# Patient Record
Sex: FEMALE | Race: WHITE | NOT HISPANIC OR LATINO | URBAN - METROPOLITAN AREA
[De-identification: names, ages, dates, MRNs, and addresses within clinical notes are randomized per-mention and may not be internally consistent; named-entity substitution may affect disease eponyms.]

---

## 2018-02-18 ENCOUNTER — INPATIENT (INPATIENT)
Facility: HOSPITAL | Age: 83
LOS: 9 days | Discharge: NURSING, REHAB OR LONG-TERM CARE | DRG: 291 | End: 2018-02-28
Attending: INTERNAL MEDICINE | Admitting: INTERNAL MEDICINE
Payer: MEDICARE

## 2018-02-18 VITALS
SYSTOLIC BLOOD PRESSURE: 107 MMHG | OXYGEN SATURATION: 100 % | DIASTOLIC BLOOD PRESSURE: 78 MMHG | RESPIRATION RATE: 32 BRPM | HEART RATE: 120 BPM

## 2018-02-18 DIAGNOSIS — J81.1 CHRONIC PULMONARY EDEMA: ICD-10-CM

## 2018-02-18 DIAGNOSIS — I50.23 ACUTE ON CHRONIC SYSTOLIC (CONGESTIVE) HEART FAILURE: ICD-10-CM

## 2018-02-18 DIAGNOSIS — E78.5 HYPERLIPIDEMIA, UNSPECIFIED: ICD-10-CM

## 2018-02-18 DIAGNOSIS — J96.92 RESPIRATORY FAILURE, UNSPECIFIED WITH HYPERCAPNIA: ICD-10-CM

## 2018-02-18 DIAGNOSIS — R41.82 ALTERED MENTAL STATUS, UNSPECIFIED: ICD-10-CM

## 2018-02-18 DIAGNOSIS — E03.9 HYPOTHYROIDISM, UNSPECIFIED: ICD-10-CM

## 2018-02-18 DIAGNOSIS — R09.02 HYPOXEMIA: ICD-10-CM

## 2018-02-18 DIAGNOSIS — Z95.1 PRESENCE OF AORTOCORONARY BYPASS GRAFT: Chronic | ICD-10-CM

## 2018-02-18 DIAGNOSIS — J18.9 PNEUMONIA, UNSPECIFIED ORGANISM: ICD-10-CM

## 2018-02-18 DIAGNOSIS — J90 PLEURAL EFFUSION, NOT ELSEWHERE CLASSIFIED: ICD-10-CM

## 2018-02-18 DIAGNOSIS — J98.11 ATELECTASIS: ICD-10-CM

## 2018-02-18 DIAGNOSIS — I10 ESSENTIAL (PRIMARY) HYPERTENSION: ICD-10-CM

## 2018-02-18 DIAGNOSIS — J96.00 ACUTE RESPIRATORY FAILURE, UNSPECIFIED WHETHER WITH HYPOXIA OR HYPERCAPNIA: ICD-10-CM

## 2018-02-18 DIAGNOSIS — R06.02 SHORTNESS OF BREATH: ICD-10-CM

## 2018-02-18 DIAGNOSIS — Z29.9 ENCOUNTER FOR PROPHYLACTIC MEASURES, UNSPECIFIED: ICD-10-CM

## 2018-02-18 DIAGNOSIS — I48.91 UNSPECIFIED ATRIAL FIBRILLATION: ICD-10-CM

## 2018-02-18 LAB
ALBUMIN SERPL ELPH-MCNC: 3.4 G/DL — SIGNIFICANT CHANGE UP (ref 3.3–5)
ALP SERPL-CCNC: 94 U/L — SIGNIFICANT CHANGE UP (ref 40–120)
ALT FLD-CCNC: 30 U/L — SIGNIFICANT CHANGE UP (ref 10–45)
ANION GAP SERPL CALC-SCNC: 9 MMOL/L — SIGNIFICANT CHANGE UP (ref 5–17)
APPEARANCE UR: CLEAR — SIGNIFICANT CHANGE UP
APTT BLD: 28.8 SEC — SIGNIFICANT CHANGE UP (ref 27.5–37.4)
AST SERPL-CCNC: 11 U/L — SIGNIFICANT CHANGE UP (ref 10–40)
BACTERIA # UR AUTO: PRESENT /HPF
BASE EXCESS BLDA CALC-SCNC: 3.9 MMOL/L — HIGH (ref -2–3)
BASE EXCESS BLDV CALC-SCNC: 2.3 MMOL/L — SIGNIFICANT CHANGE UP
BASOPHILS NFR BLD AUTO: 0.1 % — SIGNIFICANT CHANGE UP (ref 0–2)
BILIRUB SERPL-MCNC: 0.4 MG/DL — SIGNIFICANT CHANGE UP (ref 0.2–1.2)
BILIRUB UR-MCNC: NEGATIVE — SIGNIFICANT CHANGE UP
BUN SERPL-MCNC: 38 MG/DL — HIGH (ref 7–23)
CALCIUM SERPL-MCNC: 8.7 MG/DL — SIGNIFICANT CHANGE UP (ref 8.4–10.5)
CHLORIDE SERPL-SCNC: 102 MMOL/L — SIGNIFICANT CHANGE UP (ref 96–108)
CO2 SERPL-SCNC: 30 MMOL/L — SIGNIFICANT CHANGE UP (ref 22–31)
COLOR SPEC: YELLOW — SIGNIFICANT CHANGE UP
CREAT SERPL-MCNC: 1.1 MG/DL — SIGNIFICANT CHANGE UP (ref 0.5–1.3)
DIFF PNL FLD: NEGATIVE — SIGNIFICANT CHANGE UP
EOSINOPHIL NFR BLD AUTO: 0.1 % — SIGNIFICANT CHANGE UP (ref 0–6)
EPI CELLS # UR: SIGNIFICANT CHANGE UP /HPF (ref 0–5)
GAS PNL BLDA: SIGNIFICANT CHANGE UP
GAS PNL BLDV: SIGNIFICANT CHANGE UP
GLUCOSE SERPL-MCNC: 117 MG/DL — HIGH (ref 70–99)
GLUCOSE UR QL: NEGATIVE — SIGNIFICANT CHANGE UP
HCO3 BLDA-SCNC: 34 MMOL/L — HIGH (ref 21–28)
HCO3 BLDV-SCNC: 33 MMOL/L — HIGH (ref 20–27)
HCT VFR BLD CALC: 43.6 % — SIGNIFICANT CHANGE UP (ref 34.5–45)
HGB BLD-MCNC: 13.2 G/DL — SIGNIFICANT CHANGE UP (ref 11.5–15.5)
HYALINE CASTS # UR AUTO: (no result) /LPF (ref 0–2)
INR BLD: 1.04 — SIGNIFICANT CHANGE UP (ref 0.88–1.16)
KETONES UR-MCNC: NEGATIVE — SIGNIFICANT CHANGE UP
LACTATE SERPL-SCNC: 1 MMOL/L — SIGNIFICANT CHANGE UP (ref 0.5–2)
LEUKOCYTE ESTERASE UR-ACNC: NEGATIVE — SIGNIFICANT CHANGE UP
LYMPHOCYTES # BLD AUTO: 7.2 % — LOW (ref 13–44)
MCHC RBC-ENTMCNC: 29.1 PG — SIGNIFICANT CHANGE UP (ref 27–34)
MCHC RBC-ENTMCNC: 30.3 G/DL — LOW (ref 32–36)
MCV RBC AUTO: 96 FL — SIGNIFICANT CHANGE UP (ref 80–100)
MONOCYTES NFR BLD AUTO: 6 % — SIGNIFICANT CHANGE UP (ref 2–14)
NEUTROPHILS NFR BLD AUTO: 86.6 % — HIGH (ref 43–77)
NITRITE UR-MCNC: NEGATIVE — SIGNIFICANT CHANGE UP
PCO2 BLDA: 75 MMHG — CRITICAL HIGH (ref 32–45)
PCO2 BLDA: SIGNIFICANT CHANGE UP MMHG (ref 32–45)
PCO2 BLDV: 80 MMHG — HIGH (ref 41–51)
PH BLDA: 7.26 — LOW (ref 7.35–7.45)
PH BLDA: SIGNIFICANT CHANGE UP (ref 7.35–7.45)
PH BLDV: 7.23 — CRITICAL LOW (ref 7.32–7.43)
PH UR: 6 — SIGNIFICANT CHANGE UP (ref 5–8)
PLATELET # BLD AUTO: 278 K/UL — SIGNIFICANT CHANGE UP (ref 150–400)
PO2 BLDA: 135 MMHG — HIGH (ref 83–108)
PO2 BLDA: SIGNIFICANT CHANGE UP MMHG (ref 83–108)
PO2 BLDV: 31 MMHG — SIGNIFICANT CHANGE UP
POTASSIUM SERPL-MCNC: 4.9 MMOL/L — SIGNIFICANT CHANGE UP (ref 3.5–5.3)
POTASSIUM SERPL-SCNC: 4.9 MMOL/L — SIGNIFICANT CHANGE UP (ref 3.5–5.3)
PROT SERPL-MCNC: 6.2 G/DL — SIGNIFICANT CHANGE UP (ref 6–8.3)
PROT UR-MCNC: 100 MG/DL
PROTHROM AB SERPL-ACNC: 11.5 SEC — SIGNIFICANT CHANGE UP (ref 9.8–12.7)
RAPID RVP RESULT: SIGNIFICANT CHANGE UP
RBC # BLD: 4.54 M/UL — SIGNIFICANT CHANGE UP (ref 3.8–5.2)
RBC # FLD: 15.5 % — SIGNIFICANT CHANGE UP (ref 10.3–16.9)
RBC CASTS # UR COMP ASSIST: < 5 /HPF — SIGNIFICANT CHANGE UP
SAO2 % BLDA: 99 % — SIGNIFICANT CHANGE UP (ref 95–100)
SAO2 % BLDA: SIGNIFICANT CHANGE UP % (ref 95–100)
SAO2 % BLDV: 62 % — SIGNIFICANT CHANGE UP
SODIUM SERPL-SCNC: 141 MMOL/L — SIGNIFICANT CHANGE UP (ref 135–145)
SP GR SPEC: >=1.03 — SIGNIFICANT CHANGE UP (ref 1–1.03)
UROBILINOGEN FLD QL: 0.2 E.U./DL — SIGNIFICANT CHANGE UP
WBC # BLD: 10.1 K/UL — SIGNIFICANT CHANGE UP (ref 3.8–10.5)
WBC # FLD AUTO: 10.1 K/UL — SIGNIFICANT CHANGE UP (ref 3.8–10.5)
WBC UR QL: < 5 /HPF — SIGNIFICANT CHANGE UP

## 2018-02-18 PROCEDURE — 93010 ELECTROCARDIOGRAM REPORT: CPT

## 2018-02-18 PROCEDURE — 93306 TTE W/DOPPLER COMPLETE: CPT | Mod: 26

## 2018-02-18 PROCEDURE — 70450 CT HEAD/BRAIN W/O DYE: CPT | Mod: 26

## 2018-02-18 PROCEDURE — 71275 CT ANGIOGRAPHY CHEST: CPT | Mod: 26

## 2018-02-18 PROCEDURE — 99291 CRITICAL CARE FIRST HOUR: CPT

## 2018-02-18 PROCEDURE — 99291 CRITICAL CARE FIRST HOUR: CPT | Mod: 25

## 2018-02-18 PROCEDURE — 71045 X-RAY EXAM CHEST 1 VIEW: CPT | Mod: 26

## 2018-02-18 RX ORDER — SODIUM CHLORIDE 9 MG/ML
3 INJECTION INTRAMUSCULAR; INTRAVENOUS; SUBCUTANEOUS ONCE
Qty: 0 | Refills: 0 | Status: COMPLETED | OUTPATIENT
Start: 2018-02-18 | End: 2018-02-18

## 2018-02-18 RX ORDER — VANCOMYCIN HCL 1 G
1000 VIAL (EA) INTRAVENOUS ONCE
Qty: 0 | Refills: 0 | Status: COMPLETED | OUTPATIENT
Start: 2018-02-18 | End: 2018-02-18

## 2018-02-18 RX ORDER — ATORVASTATIN CALCIUM 80 MG/1
1 TABLET, FILM COATED ORAL
Qty: 0 | Refills: 0 | COMMUNITY

## 2018-02-18 RX ORDER — LANOLIN ALCOHOL/MO/W.PET/CERES
1 CREAM (GRAM) TOPICAL
Qty: 0 | Refills: 0 | COMMUNITY

## 2018-02-18 RX ORDER — PIPERACILLIN AND TAZOBACTAM 4; .5 G/20ML; G/20ML
3.38 INJECTION, POWDER, LYOPHILIZED, FOR SOLUTION INTRAVENOUS EVERY 6 HOURS
Qty: 0 | Refills: 0 | Status: DISCONTINUED | OUTPATIENT
Start: 2018-02-18 | End: 2018-02-20

## 2018-02-18 RX ORDER — VANCOMYCIN HCL 1 G
2000 VIAL (EA) INTRAVENOUS ONCE
Qty: 0 | Refills: 0 | Status: DISCONTINUED | OUTPATIENT
Start: 2018-02-18 | End: 2018-02-18

## 2018-02-18 RX ORDER — ASPIRIN/CALCIUM CARB/MAGNESIUM 324 MG
300 TABLET ORAL DAILY
Qty: 0 | Refills: 0 | Status: DISCONTINUED | OUTPATIENT
Start: 2018-02-18 | End: 2018-02-19

## 2018-02-18 RX ORDER — PIPERACILLIN AND TAZOBACTAM 4; .5 G/20ML; G/20ML
4.5 INJECTION, POWDER, LYOPHILIZED, FOR SOLUTION INTRAVENOUS ONCE
Qty: 0 | Refills: 0 | Status: COMPLETED | OUTPATIENT
Start: 2018-02-18 | End: 2018-02-18

## 2018-02-18 RX ORDER — IPRATROPIUM/ALBUTEROL SULFATE 18-103MCG
3 AEROSOL WITH ADAPTER (GRAM) INHALATION EVERY 4 HOURS
Qty: 0 | Refills: 0 | Status: DISCONTINUED | OUTPATIENT
Start: 2018-02-18 | End: 2018-02-28

## 2018-02-18 RX ORDER — ASPIRIN/CALCIUM CARB/MAGNESIUM 324 MG
81 TABLET ORAL DAILY
Qty: 0 | Refills: 0 | Status: DISCONTINUED | OUTPATIENT
Start: 2018-02-18 | End: 2018-02-18

## 2018-02-18 RX ORDER — VANCOMYCIN HCL 1 G
750 VIAL (EA) INTRAVENOUS EVERY 24 HOURS
Qty: 0 | Refills: 0 | Status: DISCONTINUED | OUTPATIENT
Start: 2018-02-19 | End: 2018-02-20

## 2018-02-18 RX ORDER — POLYETHYLENE GLYCOL 3350 17 G/17G
17 POWDER, FOR SOLUTION ORAL
Qty: 0 | Refills: 0 | COMMUNITY

## 2018-02-18 RX ORDER — PIPERACILLIN AND TAZOBACTAM 4; .5 G/20ML; G/20ML
3.38 INJECTION, POWDER, LYOPHILIZED, FOR SOLUTION INTRAVENOUS EVERY 6 HOURS
Qty: 0 | Refills: 0 | Status: DISCONTINUED | OUTPATIENT
Start: 2018-02-18 | End: 2018-02-18

## 2018-02-18 RX ORDER — METOPROLOL TARTRATE 50 MG
5 TABLET ORAL ONCE
Qty: 0 | Refills: 0 | Status: DISCONTINUED | OUTPATIENT
Start: 2018-02-18 | End: 2018-02-18

## 2018-02-18 RX ORDER — DOCUSATE SODIUM 100 MG
300 CAPSULE ORAL
Qty: 0 | Refills: 0 | COMMUNITY

## 2018-02-18 RX ORDER — CHOLECALCIFEROL (VITAMIN D3) 125 MCG
2 CAPSULE ORAL
Qty: 0 | Refills: 0 | COMMUNITY

## 2018-02-18 RX ORDER — ATORVASTATIN CALCIUM 80 MG/1
40 TABLET, FILM COATED ORAL AT BEDTIME
Qty: 0 | Refills: 0 | Status: DISCONTINUED | OUTPATIENT
Start: 2018-02-18 | End: 2018-02-28

## 2018-02-18 RX ORDER — SODIUM CHLORIDE 9 MG/ML
500 INJECTION INTRAMUSCULAR; INTRAVENOUS; SUBCUTANEOUS
Qty: 0 | Refills: 0 | Status: COMPLETED | OUTPATIENT
Start: 2018-02-18 | End: 2018-02-18

## 2018-02-18 RX ORDER — FUROSEMIDE 40 MG
40 TABLET ORAL ONCE
Qty: 0 | Refills: 0 | Status: COMPLETED | OUTPATIENT
Start: 2018-02-18 | End: 2018-02-18

## 2018-02-18 RX ORDER — LISINOPRIL 2.5 MG/1
1 TABLET ORAL
Qty: 0 | Refills: 0 | COMMUNITY

## 2018-02-18 RX ORDER — ASPIRIN/CALCIUM CARB/MAGNESIUM 324 MG
1 TABLET ORAL
Qty: 0 | Refills: 0 | COMMUNITY

## 2018-02-18 RX ORDER — HEPARIN SODIUM 5000 [USP'U]/ML
5000 INJECTION INTRAVENOUS; SUBCUTANEOUS EVERY 8 HOURS
Qty: 0 | Refills: 0 | Status: DISCONTINUED | OUTPATIENT
Start: 2018-02-18 | End: 2018-02-28

## 2018-02-18 RX ADMIN — Medication 108 MILLIGRAM(S): at 11:46

## 2018-02-18 RX ADMIN — Medication 300 MILLIGRAM(S): at 23:55

## 2018-02-18 RX ADMIN — SODIUM CHLORIDE 2000 MILLILITER(S): 9 INJECTION INTRAMUSCULAR; INTRAVENOUS; SUBCUTANEOUS at 10:55

## 2018-02-18 RX ADMIN — PIPERACILLIN AND TAZOBACTAM 200 GRAM(S): 4; .5 INJECTION, POWDER, LYOPHILIZED, FOR SOLUTION INTRAVENOUS at 18:56

## 2018-02-18 RX ADMIN — HEPARIN SODIUM 5000 UNIT(S): 5000 INJECTION INTRAVENOUS; SUBCUTANEOUS at 22:58

## 2018-02-18 RX ADMIN — SODIUM CHLORIDE 3 MILLILITER(S): 9 INJECTION INTRAMUSCULAR; INTRAVENOUS; SUBCUTANEOUS at 11:10

## 2018-02-18 RX ADMIN — Medication 1 DROP(S): at 22:58

## 2018-02-18 RX ADMIN — SODIUM CHLORIDE 2000 MILLILITER(S): 9 INJECTION INTRAMUSCULAR; INTRAVENOUS; SUBCUTANEOUS at 11:10

## 2018-02-18 RX ADMIN — PIPERACILLIN AND TAZOBACTAM 200 GRAM(S): 4; .5 INJECTION, POWDER, LYOPHILIZED, FOR SOLUTION INTRAVENOUS at 11:40

## 2018-02-18 RX ADMIN — SODIUM CHLORIDE 2000 MILLILITER(S): 9 INJECTION INTRAMUSCULAR; INTRAVENOUS; SUBCUTANEOUS at 11:00

## 2018-02-18 RX ADMIN — Medication 250 MILLIGRAM(S): at 13:03

## 2018-02-18 RX ADMIN — SODIUM CHLORIDE 2000 MILLILITER(S): 9 INJECTION INTRAMUSCULAR; INTRAVENOUS; SUBCUTANEOUS at 11:37

## 2018-02-18 NOTE — H&P ADULT - HISTORY OF PRESENT ILLNESS
89 yo F with PMH HTN, HLD, hypothyroidism, osteoporosis PSHx triple bipass and mitral valve repair, presented from nursing/rehab center where she was found to be altered, tachypneic and in respiratory distress. In ED she was noted to be altered, tachypneic and tachycardic so she was placed on BIPAP. Vitals -126, /82, RR 17-27, SpO2 94% on BIPAP. CT head was negative for any acute pathology. EKG showed Aflutter with variable block, LAD, LVH with repolarization abnormality Labs were remarkable for Trop 0.02, pro-BNP 4552. VBG showed respiratory acidosis pH 7.23, pCO2 80, HCO3 33.  Bedside echo performed by cardio fellow showed EF 35-40%, LVH and hypokinetic RV. Chest X ray showed congestion and/or infiltrates with bilateral effusions. CTA PE protocol was ordered given hypoxia and signs of right heart failure. CTA was negative for PE, showed large bilateral pleural effusions and atelectasis of the bilateral lower lobes with patchy airspace opacities in the right upper lobe most likely representing pneumonia. Pt received 40 mg IV lasix and was transfered to CCU for further management. Upon arrival in the unit, the patient remained on BIPAP and altered, minimally responsive. 89 yo F with PMH HTN, HLD, hypothyroidism, osteoporosis PSHx triple bypass and mitral valve repair, presented from nursing/rehab center where she was found to be altered, tachypneic and in respiratory distress. In ED she was noted to be altered, tachypneic and tachycardic so she was placed on BIPAP. Vitals -126, /82, RR 17-27, SpO2 94% on BIPAP. CT head was negative for any acute pathology. EKG showed Aflutter with variable block, LAD, LVH with repolarization abnormality Labs were remarkable for Trop 0.02, pro-BNP 4552. VBG showed respiratory acidosis pH 7.23, pCO2 80, HCO3 33.  Bedside echo performed by cardio fellow showed EF 35-40%, LVH and hypokinetic RV. Chest X ray showed congestion and/or infiltrates with bilateral effusions. CTA PE protocol was ordered given hypoxia and signs of right heart failure. CTA was negative for PE, showed large bilateral pleural effusions and atelectasis of the bilateral lower lobes with patchy airspace opacities in the right upper lobe most likely representing pneumonia. Pt received 40 mg IV lasix and was transfered to CCU for further management. Upon arrival in the unit, the patient remained on BIPAP and altered, minimally responsive. 89 yo F with PMH CHF, HTN, HLD, hypothyroidism, osteoporosis PSHx triple bypass and mitral valve repair, presented from nursing/rehab center where she was found to be altered, tachypneic and in respiratory distress. In ED she was noted to be altered, tachypneic and tachycardic so she was placed on BIPAP. Vitals -126, /82, RR 17-27, SpO2 94% on BIPAP. CT head was negative for any acute pathology. EKG showed Aflutter with variable block, LAD, LVH with repolarization abnormality Labs were remarkable for Trop 0.02, pro-BNP 4552. VBG showed respiratory acidosis pH 7.23, pCO2 80, HCO3 33.  Bedside echo performed by cardio fellow showed EF 35-40%, LVH and hypokinetic RV. Chest X ray showed congestion and/or infiltrates with bilateral effusions. CTA PE protocol was ordered given hypoxia and signs of right heart failure. CTA was negative for PE, showed large bilateral pleural effusions and atelectasis of the bilateral lower lobes with patchy airspace opacities in the right upper lobe most likely representing pneumonia. Pt received 40 mg IV lasix and was transfered to CCU for further management. Upon arrival in the unit, the patient remained on BIPAP and altered, minimally responsive.

## 2018-02-18 NOTE — H&P ADULT - PROBLEM SELECTOR PLAN 2
Likely 2/2 CHF exacerbation and HAP. CT chest with large bilateral pleural effusions, atelectasis. Chest  -VBG showed respiratory acidosis pH 7.23, pCO2 80, HCO3 33. Repeat ABG showed  minimal improvement pH: 7.26/pCO2: 75/pO2:135/HCO3: 34/SaO2: 99 on 40%FiO2 RR 8, IPAP14/EPAP5. BIPAP setting changed to IPAP 14, EPAP5, RR12, FiO2 40%  -Will repeat ABG 4 hours later and adjust BIPAP settings accordingly

## 2018-02-18 NOTE — H&P ADULT - ATTENDING COMMENTS
89F with Atrial Fibrillation not on A/C, Essential HTN, HLD, Hypothyroidism, Systolic Heart Failure (unknown LVEF), Diastolic Heart Failure who presents from [place] with report of lethargy. Patient found to be in hypercarbic respiratory failure in [facility] ED requiring BIPAP initiation. CXR and CT Chest c/w pulmonary edema, pleural effusions and infiltrate c/f pneumonia. Pt given Lasix 60mg IV x1 and 600cc IVFs in ED.  Patient admitted to CCU for further management.     Vitals, labs, EKG and imaging reviewed 2/18  Exam notable for elderly frail female, laying in bed, BIPAP in place - large air leak, EJ markedly distended, pupils reactive to light b/l, arcus senelis, irreg irreg rate and rhythm, diffuse wet crackles, flat abdomen, 1+ YANIRA, cool feet, responsive to vocal and tactile stimuli, not oriented. NB: pt unable to hear upper registers well and responds promptly to lower register voices    -Continuous BIPAP   -Serial ABG to monitor CO2 improvement  -Lasix IV for goal urine out 3L, at least net neg 2L  -V/Z for broad spectrum HCAP coverage  -Hold on initiation of BB for Afib rate control given decompensated state  -SQ Hep ppx dose, hold on tx dose A/C given frailty and high bleeding risk  -Obtain collateral re cardiac history including Afib and A/C from Cardiologist and Alexis  -Obtain RVP swab  -Formal TTE  -Code status: DNR/DNI confirmed with son/HCP  -Pt to remain in CCU    MD Sarah  CCU Attending

## 2018-02-18 NOTE — H&P ADULT - ASSESSMENT
89 yo F with PMH HTN, HLD, hypothyroidism, osteoporosis PSHx triple bypass and mitral valve repair, presented from nursing/rehab center where she was found to be altered, tachypneic and in respiratory distress, admitted to the CCU for CHF exacerbation and hypoxic hypercapnic respiratory failure. 89 yo F with PMH CHF, HTN, HLD, hypothyroidism, osteoporosis PSHx triple bypass and mitral valve repair, presented from nursing/rehab center where she was found to be altered, tachypneic and in respiratory distress, admitted to the CCU for CHF exacerbation and hypoxic hypercapnic respiratory failure.

## 2018-02-18 NOTE — ED PROVIDER NOTE - NORMAL, MLM
Called pt and relayed MD note below.   Patient wanted me to let Dr vega know that she is not allergic Penicillin and that she does not want to put anything else harsh into her symptom that she does not need. She also knows about the probiotics and does not want to take them since they do not always work.     dalia all pertinent systems normal

## 2018-02-18 NOTE — ED PROVIDER NOTE - CHIEF COMPLAINT
The patient is a 89y Female complaining of weakness. The patient is a 89y Female p/w respiratory distress

## 2018-02-18 NOTE — ED ADULT NURSE REASSESSMENT NOTE - NS ED NURSE REASSESS COMMENT FT1
Patient remains in ED on cardiac monitor and BIPAP. Patient opens eyes to verbal stimuli. Safety precautions maintained. Will continue to monitor.

## 2018-02-18 NOTE — H&P ADULT - PROBLEM SELECTOR PLAN 4
Pt currently normotensive  -Will hold home metoprolol 12.5 BID, Lisinopril 2.5 mg [id number] 5, however risk of anticoagulation outways benefits given pt's age and functional status  -Currently rate controlled on tele  -Holding on AC   -Daily EKG

## 2018-02-18 NOTE — H&P ADULT - PROBLEM SELECTOR PLAN 6
Pt with Hx of hypothyroidism per documentation from rehab/nursing facility but no synthroid listed   -Will check thyroid function Will check lipid panel  -Atorvastatin 40 mg daily

## 2018-02-18 NOTE — ED ADULT NURSE NOTE - OBJECTIVE STATEMENT
Patient is a 89yo female brought in by EMS from [facility] who, as per staff, has been lethargic since Friday and was found to be less responsive today. Patient responds to verbal by turning toward voice but does not respond verbally.

## 2018-02-18 NOTE — H&P ADULT - PROBLEM SELECTOR PLAN 5
Will check lipid panel  -Atorvastatin 40 mg daily Pt currently normotensive  -Will hold home metoprolol 12.5 BID, Lisinopril 2.5 mg

## 2018-02-18 NOTE — H&P ADULT - NSHPSOCIALHISTORY_GEN_ALL_CORE
Lives in [facility] [place].  Unable to obtain further social Hx given clinical condition.

## 2018-02-18 NOTE — H&P ADULT - PROBLEM SELECTOR PLAN 3
Pt meeting sepsis criteria on admission (tachypnea, tachycardia and suspected PNA on CT chest). CT chest with patchy airspace opacities in the right upper lobe most likely representing pneumonia.  --Will cover for HAP with Vanc 750 mg Q24hr and zosyn 3.375 g IV Q6  -Next Vanc trough 2/21 at noon  -Will check RVP Pt meeting sepsis criteria on admission (tachypnea, tachycardia and suspected PNA on CT chest). CT chest with patchy airspace opacities in the right upper lobe most likely representing pneumonia.  --Will cover for HAP with Vanc 750 mg Q24hr and zosyn 3.375 g IV Q6  -Next Vanc trough 2/21 at noon  -Will check RVP  -UA negative

## 2018-02-18 NOTE — ED PROVIDER NOTE - MEDICAL DECISION MAKING DETAILS
89 yo F with  hx of  HTN, HLD, hypothyroidism, osteoporosis, CABG and mitral valve repair, [facility] [place], where she has had decline in mental status with SOB X 4 days. In ED she was noted to be altered, tachypneic and tachycardic so she was placed on BIPAP with improvement in sxs. Vitals -126, /82, RR 17-27, SpO2 94% on BIPAP. CT head was negative for any acute pathology. EKG showed Aflutter with variable block, LAD, LVH with repolarization abnormality Labs were remarkable for Trop 0.02, pro-BNP 4552. VBG showed respiratory acidosis pH 7.23, pCO2 80, HCO3 33.  Bedside echo performed by cardio fellow showed EF 35-40%, LVH and hypokinetic RV. Chest X ray showed congestion and/or infiltrates with bilateral effusions. CTA PE protocol was ordered given hypoxia and signs of right heart failure. CTA was negative for PE, showed pulm congestion and effusions. Pt admitted to cardiology/CCU.

## 2018-02-18 NOTE — H&P ADULT - PROBLEM SELECTOR PLAN 8
F: Holding fluids as pt fluid overloaded  E: Replete lyte with goal K>4 and Mg>2  N; NPO while on BIPAP  Code: DNR/DNI per discussion with pt son

## 2018-02-18 NOTE — ED PROVIDER NOTE - CRITICAL CARE PROVIDED
direct patient care (not related to procedure)/interpretation of diagnostic studies/consultation with other physicians/consult w/ pt's family directly relating to pts condition/conducted a detailed discussion of DNR status/additional history taking

## 2018-02-18 NOTE — ED PROVIDER NOTE - OBJECTIVE STATEMENT
89 yo female, with hx of 89 yo F with  hx of  HTN, HLD, hypothyroidism, osteoporosis, CABG and mitral valve repair, presents from [facility] rehab [place] where she has had decline in mental status with SOB X 4 days. Pt had CXR done at [place] 3 days ago that showed b/l pulm congestion and effusion. Pt was found today to have acutely decompensated and was brought to ED by EMS for further evaluation. Unable to get any history  altered, tachypneic and in respiratory distress. In ED she was noted to be altered, tachypneic and tachycardic so she was placed on BIPAP. Vitals -126, /82, RR 17-27, SpO2 94% on BIPAP. CT head was negative for any acute pathology. EKG showed Aflutter with variable block, LAD, LVH with repolarization abnormality Labs were remarkable for Trop 0.02, pro-BNP 4552. VBG showed respiratory acidosis pH 7.23, pCO2 80, HCO3 33.  Bedside echo performed by cardio fellow showed EF 35-40%, LVH and hypokinetic RV. Chest X ray showed congestion and/or infiltrates with bilateral effusions. CTA PE protocol was ordered given hypoxia and signs of right heart failure. CTA was negative for PE, showed large b/ 89 yo F with  hx of  HTN, HLD, hypothyroidism, osteoporosis, CABG and mitral valve repair, presents from [facility] rehab [place], where she has had decline in mental status with SOB X 4 days. Pt had CXR done at [place] 3 days ago that showed b/l pulm congestion and effusion. Pt was found today to have acutely decompensated and was brought to ED by EMS for further evaluation. Unable to get any history from pt as she is altered, tachypneic and in respiratory distress.

## 2018-02-18 NOTE — H&P ADULT - PROBLEM SELECTOR PLAN 7
F: Holding fluids as pt fluid overloaded  E: Replete lyte with goal K>4 and Mg>2  N; NPO while on BIPAP  Code: DNR/DNI per discussion with pt son Pt with Hx of hypothyroidism per documentation from rehab/nursing facility but no synthroid listed   -Will check thyroid function

## 2018-02-18 NOTE — H&P ADULT - NSHPPHYSICALEXAM_GEN_ALL_CORE
VITAL SIGNS:  T(F): 98.5 (02-18-18 @ 14:50), Max: 99.2 (02-18-18 @ 11:00)  HR: 106 (02-18-18 @ 17:00) (84 - 140)  BP: 96/46 (02-18-18 @ 17:00) (86/52 - 139/69)  BP(mean): 70 (02-18-18 @ 17:00) (70 - 96)  RR: 23 (02-18-18 @ 17:00) (12 - 32)  SpO2: 100% (02-18-18 @ 17:00) (84% - 100%)    02-18-18 @ 07:01  -  02-18-18 @ 17:04  --------------------------------------------------------  IN: 1050 mL / OUT: 2720 mL / NET: -1670 mL    PHYSICAL EXAM:  Constitutional: WDWN, NAD, resting comfortably   Head: NC/AT  Eyes: PERRL, EOMI, anicteric sclera, no mucosal pallor  ENT: no nasal discharge; uvula midline, no oropharyngeal erythema or exudates; MMM  Neck: supple; no JVD or thyromegaly, no lymphadenopathy  Respiratory: CTA B/L; no W/R/R, no retractions  Cardiac: +S1/S2; RRR; no M/R/G; PMI non-displaced  Gastrointestinal: abdomen soft, NT/ND; no rebound or guarding; +BSx4  Back: spine midline, no bony tenderness or step-offs; no CVAT B/L  Extremities: warm; no calf tenderness, no pedal edema, no cyanosis, no clubbing  Musculoskeletal: NROM x4; no joint swelling, tenderness or erythema  Vascular: 2+ radial, femoral; DP/PT pulses B/L  Dermatologic: no rash, no petechia   Lymphatic: no submandibular or cervical LAD  Neurologic: AAOx3; CNII-XII grossly intact; 5/5 strength throughout, sensory symmetrically intact in B/L LE   Psychiatric: pleasant and conversive; affect and characteristics of appearance, verbalizations, behaviors are appropriate VITAL SIGNS:  T(F): 98.5 (02-18-18 @ 14:50), Max: 99.2 (02-18-18 @ 11:00)  HR: 106 (02-18-18 @ 17:00) (84 - 140)  BP: 96/46 (02-18-18 @ 17:00) (86/52 - 139/69)  BP(mean): 70 (02-18-18 @ 17:00) (70 - 96)  RR: 23 (02-18-18 @ 17:00) (12 - 32)  SpO2: 100% (02-18-18 @ 17:00) (84% - 100%)    02-18-18 @ 07:01  -  02-18-18 @ 17:04  --------------------------------------------------------  IN: 1050 mL / OUT: 2720 mL / NET: -1670 mL    PHYSICAL EXAM:  Constitutional: Cachectic, altered and minimally responsive    Head: NC/AT  Eyes: PERRL, EOMI, anicteric sclera, pale mucosa  ENT: dry mucous membrane   Neck: supple; JVD 3-4 cm above sternal angle  Respiratory: CTA B/L; no W/R/R, no retractions  Cardiac: +S1/S2; irregularly irregular, Afib on tele, no M/R/G  Gastrointestinal: abdomen soft, NT/ND; no rebound or guarding; +BSx4  Extremities: cold, no calf tenderness, no pedal edema, no cyanosis, no clubbing  Musculoskeletal: NROM x4; no joint swelling, tenderness or erythema  Vascular: 2+ radial, femoral; DP/PT pulses B/L  Dermatologic: no rash, no petechia   Lymphatic: no submandibular or cervical LAD  Neurologic: AAOx3; CNII-XII grossly intact; 5/5 strength throughout, sensory symmetrically intact in B/L LE   Psychiatric: pleasant and conversive; affect and characteristics of appearance, verbalizations, behaviors are appropriate VITAL SIGNS:  T(F): 98.5 (02-18-18 @ 14:50), Max: 99.2 (02-18-18 @ 11:00)  HR: 106 (02-18-18 @ 17:00) (84 - 140)  BP: 96/46 (02-18-18 @ 17:00) (86/52 - 139/69)  BP(mean): 70 (02-18-18 @ 17:00) (70 - 96)  RR: 23 (02-18-18 @ 17:00) (12 - 32)  SpO2: 100% (02-18-18 @ 17:00) (84% - 100%)    02-18-18 @ 07:01  -  02-18-18 @ 17:04  --------------------------------------------------------  IN: 1050 mL / OUT: 2720 mL / NET: -1670 mL    PHYSICAL EXAM:  Constitutional: Cachectic, altered and minimally responsive    Head: NC/AT  Eyes: PERRL, EOMI, anicteric sclera, pale mucosa  ENT: dry mucous membrane   Neck: supple; JVD 3-4 cm above sternal angle  Respiratory: CTA B/L; no W/R/R, no retractions  Cardiac: +S1/S2; irregularly irregular, Afib on tele, no M/R/G  Gastrointestinal: abdomen soft, NT/ND; no rebound or guarding; +BSx4  Extremities: cold, no calf tenderness, trace pedal edema B/L, no cyanosis, no clubbing  Musculoskeletal: Nornal passive ROM x4; no joint swelling, tenderness or erythema  Vascular: 1+ radial, DP/PT pulses B/L  Neurologic: AAOx0, arousable but altered, unable to assess strength or sensation

## 2018-02-18 NOTE — ED PROVIDER NOTE - PROGRESS NOTE DETAILS
In ED, pt noted to be altered, tachypneic and tachycardic so she was placed on BIPAP. Vitals -126, /82, RR 17-27, SpO2 94% on BIPAP. CT head was negative for any acute pathology. EKG showed Aflutter with variable block, LAD, LVH with repolarization abnormality Labs were remarkable for Trop 0.02, pro-BNP 4552. VBG showed respiratory acidosis pH 7.23, pCO2 80, HCO3 33.  Bedside echo performed by cardiology fellow -showed EF 35-40%, LVH and hypokinetic RV. Chest X ray showed congestion and/or infiltrates with bilateral effusions. CTA PE protocol was ordered given hypoxia and signs of right heart failure. CTA was negative for PE, showed pulm congestion and effusions. Pt covered with IV abx for pna. Suspect cardiac etiology. Pt admitted to CCU. Pt's DNR/DNI status confirmed by  over the phone with her brother.

## 2018-02-18 NOTE — ED ADULT NURSE REASSESSMENT NOTE - NS ED NURSE REASSESS COMMENT FT1
Patient on BIPAP, settings IPAP 14, EPAP 5, O2 43%, Rate 10. Remains on cardiac monitor. IV antibiotic infusing as ordered. As per Dr. Storey discontinue fluids, patient received 200ml prior to discontinuing fluids. Prince draining to gravity. Will continue to monitor.

## 2018-02-18 NOTE — CONSULT NOTE ADULT - SUBJECTIVE AND OBJECTIVE BOX
ICU CONSULT    Patient is a 89y old  Female who presents with a chief complaint of altered mental status. Patient       PMHx:  PAST MEDICAL & SURGICAL HISTORY:      Home medications:    Current Medications:  MEDICATIONS  (STANDING):  vancomycin  IVPB 1000 milliGRAM(s) IV Intermittent once    MEDICATIONS  (PRN):      Allergies:  Allergies    aminocaproic acid (Unknown)    Intolerances        Social history:  Tobacco  EtOH  Drugs  Social life    Family history:  FAMILY HISTORY:      Review of system:  General: No malaise, fever, chills.  Eyes: No eye pain, visual disturbances, or discharge  ENMT:  No difficulty hearing, tinnitus, vertigo; No sinus or throat pain  Neck: No pain or stiffness  Respiratory: No cough, wheezing, chills or hemoptysis  Cardiovascular: No chest pain, palpitations, shortness of breath, dizziness or leg swelling  Gastrointestinal: No abdominal or epigastric pain. no nausea, vomiting or hematemesis;  no diarrhea or constipation. No melena or hematochezia.  Genitourinary: No dysuria, frequency, hematuria or incontinence    VITAL SIGNS:  ICU Vital Signs Last 24 Hrs  T(C): 37.3 (18 Feb 2018 11:00), Max: 37.3 (18 Feb 2018 11:00)  T(F): 99.2 (18 Feb 2018 11:00), Max: 99.2 (18 Feb 2018 11:00)  HR: 84 (18 Feb 2018 12:04) (84 - 140)  BP: 93/50 (18 Feb 2018 12:04) (86/52 - 129/82)  BP(mean): --  ABP: --  ABP(mean): --  RR: 24 (18 Feb 2018 12:04) (22 - 32)  SpO2: 97% (18 Feb 2018 12:04) (93% - 100%)      02-18-18 @ 07:01  -  02-18-18 @ 12:50  --------------------------------------------------------  IN: 200 mL / OUT: 900 mL / NET: -700 mL      CAPILLARY BLOOD GLUCOSE          PHYSICAL EXAM   General: NAD, comfortable, AOx3  HEENT: NCAT, PERRL, clear conjunctiva, no scleral icterus  Neck: supple, no JVD  Respiratory: CTA b/l, no wheezing, rhonchi, rales  Cardiovascular: RRR, normal S1S2, no M/R/G  Abdomen: soft, NT/ND, bowel sounds normoactive throughout, no palpable masses  Extremities: WWP, no clubbing, cyanosis, or edema  Neuro -  - Mental Status:  Alert, awake, oriented to person, place, and time; Speech is fluent with intact naming, repetition, and comprehension  - Cranial Nerves II-XII:    II:  Visual fields are full to confrontation;  Pupils are equal, round, and reactive to light.  III, IV, VI:  Extraocular movements are intact without nystagmus.  V:  Facial sensation is intact in the V1-V3 distribution bilaterally.  VII:  Face is symmetric with normal eye closure and smile  VIII:  Hearing is intact to finger rub.  IX, X:  Uvula is midline and soft palate rises symmetrically  XI:  Head turning and shoulder shrug are intact.  XII:  Tongue protrudes in the midline.  - Motor:  Strength is 5/5 throughout.  There is no pronator drift.  Normal muscle bulk and tone throughout.  - Reflexes:  2+ and symmetric at the biceps, triceps, brachioradialis, knees, and ankles.  Plantar responses flexor.  - Sensory:  Intact to light touch and pin prick  - Coordination:  Finger-nose-finger and heel-knee-shin intact without dysmetria.  Rapid alternating hand movements intact.  - Gait:   Normal steps, base, arm swing, and turning.  Heel and toe walking are normal.  Tandem gait is normal.  Romberg testing is negative.    LABS                        13.2   10.1  )-----------( 278      ( 18 Feb 2018 11:08 )             43.6     02-18    141  |  102  |  38<H>  ----------------------------<  117<H>  4.9   |  30  |  1.10    Ca    8.7      18 Feb 2018 11:08    TPro  6.2  /  Alb  3.4  /  TBili  0.4  /  DBili  x   /  AST  11  /  ALT  30  /  AlkPhos  94  02-18    PT/INR - ( 18 Feb 2018 11:08 )   PT: 11.5 sec;   INR: 1.04          PTT - ( 18 Feb 2018 11:08 )  PTT:28.8 sec  Lactate, Blood: 1.0 mmoL/L (02-18-18 @ 11:06)    Urinalysis Basic - ( 18 Feb 2018 11:12 )    Color: Yellow / Appearance: Clear / SG: >=1.030 / pH: x  Gluc: x / Ketone: NEGATIVE  / Bili: Negative / Urobili: 0.2 E.U./dL   Blood: x / Protein: 100 mg/dL / Nitrite: NEGATIVE   Leuk Esterase: NEGATIVE / RBC: < 5 /HPF / WBC < 5 /HPF   Sq Epi: x / Non Sq Epi: 0-5 /HPF / Bacteria: Present /HPF        IMAGING  CXR -   EKG -       Case discussed with     and updated primary team.

## 2018-02-18 NOTE — H&P ADULT - PROBLEM SELECTOR PLAN 1
Bedside echo performed by cardio fellow showed EF 35-40%, LVH and hypokinetic RV. CTA PE protocol showed cardiomegaly and post MVR, right cardiac chamber   enlargement suggesting right-sided congestive heart failure  -Will order official echo  -s/p 40 mg Lasix IV with adequate reponse: Urine output 2720 cc  -Will dose lasix 40 mg IV for tonight   -Goal urine out 3L, at least net neg 2L  -strick I/Os  -Daily weight  -Holding BB, ACE-I/ARB in the setting of sepsis Bedside echo performed by cardio fellow showed EF 35-40%, LVH and hypokinetic RV. CTA PE protocol showed cardiomegaly and post MVR, right cardiac chamber   enlargement suggesting right-sided congestive heart failure.  pro-BNP 4552.  -Trop 0.02, likely from demand ischemia. No need to trend as pt will have no escalation of care   -Will order official echo  -s/p 40 mg Lasix IV with adequate reponse: Urine output 2720 cc  -Will dose lasix 40 mg IV for tonight   -Goal urine out 3L, at least net neg 2L  -strick I/Os  -Daily weight  -Holding BB, ACE-I/ARB in the setting of sepsis

## 2018-02-18 NOTE — H&P ADULT - NSHPLABSRESULTS_GEN_ALL_CORE
LABS:                        13.2   10.1  )-----------( 278      ( 18 Feb 2018 11:08 )             43.6     02-18    141  |  102  |  38<H>  ----------------------------<  117<H>  4.9   |  30  |  1.10    Ca    8.7      18 Feb 2018 11:08    TPro  6.2  /  Alb  3.4  /  TBili  0.4  /  DBili  x   /  AST  11  /  ALT  30  /  AlkPhos  94  02-18    PT/INR - ( 18 Feb 2018 11:08 )   PT: 11.5 sec;   INR: 1.04          PTT - ( 18 Feb 2018 11:08 )  PTT:28.8 sec  Urinalysis Basic - ( 18 Feb 2018 11:12 )    Color: Yellow / Appearance: Clear / SG: >=1.030 / pH: x  Gluc: x / Ketone: NEGATIVE  / Bili: Negative / Urobili: 0.2 E.U./dL   Blood: x / Protein: 100 mg/dL / Nitrite: NEGATIVE   Leuk Esterase: NEGATIVE / RBC: < 5 /HPF / WBC < 5 /HPF   Sq Epi: x / Non Sq Epi: 0-5 /HPF / Bacteria: Present /HPF    RADIOLOGY & ADDITIONAL TESTS:  < from: Xray Chest 1 View AP/PA (02.18.18 @ 11:16) >    Impression: Congestion and/or infiltrates. Bilateral effusions        < from: CT Angio Chest PE Protocol w/ IV Cont (02.18.18 @ 13:54) >    IMPRESSION:     1. No acute or chronic pulmonary embolism.  2. Large bilateral pleural effusions. Atelectasis of the bilateral lower   lobes. Patchy airspace opacities in the right upper lobe most likely   representing pneumonia.  3. Cardiomegaly and post MVR. Moreover there is right cardiac chamber   enlargement andright-sided congestive heart failure cannot be excluded.   The pulmonary artery however is within normal limits in caliber.  4. Lobulated, calcified 1.9 cm structure in the upper outer quadrant of   the right breast. Clinical and mammographic correlation.  4. Mild subcutaneous fat stranding throughout the chest wall suggestive   of anasarca.    < from: CT Head No Cont (02.18.18 @ 11:31) >    IMPRESSION:    No acute intracranial hemorrhage or CT evidence of acute transcortical infarction. 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

## 2018-02-18 NOTE — CHART NOTE - NSCHARTNOTEFT_GEN_A_CORE
Spoke with patient's HCP, her son Germán Sosa, gave him an update about his mother's status. After extended discussion, he does not want any invasive procedures to be done on his mother including A-lines and central lines, and would like to just continue to treat as we are at the current time with antibiotics and diuresis.

## 2018-02-18 NOTE — PROGRESS NOTE ADULT - SUBJECTIVE AND OBJECTIVE BOX
PUD CCM ATTENDING     being asked to evaluate this elderly nursing home patient with altered mental state  DNR, DNI confirmed  CNS responding on BiPAP    PAST MEDICAL & SURGICAL HISTORY:  Hyperlipidemia  Hypothyroid  Hypertension  Atherosclerosis    FAMILY HISTORY:  NCT    SOCIAL HISTORY:  in NH; son in [place]    REVIEW OF SYSTEMS:  nonverbal, on BiPAP    Vital Signs Last 24 Hrs  T(C): 37.3 (18 Feb 2018 11:00), Max: 37.3 (18 Feb 2018 11:00)  T(F): 99.2 (18 Feb 2018 11:00), Max: 99.2 (18 Feb 2018 11:00)  HR: 93 (18 Feb 2018 14:28) (84 - 140)  BP: 131/73 (18 Feb 2018 14:28) (86/52 - 139/69)  BP(mean): --  RR: 16 (18 Feb 2018 14:28) (16 - 32)  SpO2: 100% (18 Feb 2018 14:28) (92% - 100%)    I&O's Detail    18 Feb 2018 07:01  -  18 Feb 2018 15:35  --------------------------------------------------------  IN:    IV PiggyBack: 350 mL    Sodium Chloride 0.9% IV Bolus: 700 mL  Total IN: 1050 mL    OUT:    Indwelling Catheter - Urethral: 2100 mL  Total OUT: 2100 mL    Total NET: -1050 mL    PHYSICAL EXAM:  evaluated in ER, CT scan suite, CCU; several times  malnourished, muscle atrophy  uncomfortable, - acute distress ON BiPAP; vital signs are monitored continuously  Eyes: PERRLA, EOMI, -conjunctivitis, -scleritis   Head: no focal deficit, normocephalic,  no trauma  ENMT: DRY tongue, no thrush, -nasal discharge, -hoarseness, abnormal hearing, -cough, -hemoptysis, trachea midline  Neck: supple, - lymphadenopathy,  -masses, -JVD  Respiratory: bilateral diminished breath sounds, -wheezing, -rhonchi, = rales, -crackles  Chest: -accessory muscle use, -paradoxical breathing  Cardiovascular: RAPID AFIB, S1 S2 normal, -S3, -S4, -murmur, -gallop, -rub  Gastrointestinal: soft, nontender, nondistended, normal bowel sounds, no hepatosplenomegaly  Genitourinary: -flank pain, -dysuria  Extremities: -clubbing, -cyanosis, -edema    Vascular: peripheral pulses palpable 2+ bilaterally  Neurological: awake, answering, no focal deficit, -tremor   Skin: warm, dry, -erythema, iv site intact  Lymph nodes; no cervical, supraclavicular or axillary adenopathy  Psychiatric: cooperative, appropriate mood    MEDICATIONS  (STANDING):    MEDICATIONS  (PRN):    Allergies    aminocaproic acid (Unknown)    Intolerances    LABS:                        13.2   10.1  )-----------( 278      ( 18 Feb 2018 11:08 )             43.6     02-18    141  |  102  |  38<H>  ----------------------------<  117<H>  4.9   |  30  |  1.10    Ca    8.7      18 Feb 2018 11:08    TPro  6.2  /  Alb  3.4  /  TBili  0.4  /  DBili  x   /  AST  11  /  ALT  30  /  AlkPhos  94  02-18  PT/INR - ( 18 Feb 2018 11:08 )   PT: 11.5 sec;   INR: 1.04     PTT - ( 18 Feb 2018 11:08 )  PTT:28.8 sec    Urinalysis Basic - ( 18 Feb 2018 11:12 )    Color: Yellow / Appearance: Clear / SG: >=1.030 / pH: x  Gluc: x / Ketone: NEGATIVE  / Bili: Negative / Urobili: 0.2 E.U./dL   Blood: x / Protein: 100 mg/dL / Nitrite: NEGATIVE   Leuk Esterase: NEGATIVE / RBC: < 5 /HPF / WBC < 5 /HPF   Sq Epi: x / Non Sq Epi: 0-5 /HPF / Bacteria: Present /HPF    +DVT prophylaxis  SC HEPARIN  +Sleep  ON BiPAP  +Nutrition goals  NPO for now  -Pain  DENIED  -Decubital ulcer  +GI prophylaxis (PPV, coagulopathy, Hx)  +Aspiration prophylaxis (45 degrees)  Ventilator synchronized   14/5, 50%  Tracheal cuff pressure  ORONASAL INTERFACE  +Sedation/analgesia stopped once  +ID (phos, CH, mupi, SB)  -Delirium  +Cardiac  +Prevention  +Education  +Medication reviewed (drug-drug interactions, PDA)  Medical devices  Discussed with CCU, ER, PGYs, CCRN, ER RN    RADIOLOGY & ADDITIONAL STUDIES:      INTERPRETATION:  CTA (CT angiography) of the CHEST dated 2/18/2018 1:54 PM    INDICATION: Shortness of breath. Assess for pulmonary embolism.    TECHNIQUE: CT angiography of the chest was performed during bolus   injection of intravenous contrast.  Post-processing including the   production of axial, coronal and sagittal multiplanar reformatted images   and axial and coronal maximum intensity projections (MIPs) was performed.    PRIOR STUDY: None.    FINDINGS: No pulmonary embolism is seen. There is severe calcified   atherosclerotic plaque throughout the imaged aorta. The heart is normal   in size. No pericardial effusion is seen. No mediastinal, hilar or   axillary lymphadenopathy is seen. A small coarse calcification versus   surgical clip is noted in the inferior left breast. A lobulated,   calcified 1.9 cm structure is noted in the upper outer quadrant of the   right breast.    There are bilateral large pleural effusions. Patchy airspace opacities   are noted in the right lung, predominantly in the right upper lobe. There   is collapse of the right lower lobe and near complete collapse of the   left lower lobe.     Limited evaluation of the upper abdomen demonstrates no definite   abnormality.     Evaluation of the osseous structures demonstrates severe degenerative   changes of the spine. Sternal wires from prior sternotomy are present.   There is mild subcutaneous fat stranding throughout the chest wall   suggestive of anasarca.      IMPRESSION:     1. No acute or chronic pulmonary embolism.  2. Large bilateral pleural effusions. Atelectasis of the bilateral lower   lobes. Patchy airspace opacities in the right lung, most likely   representing pneumonia.  3. Lobulated, calcified 1.9 cm structure in the upper outer quadrant of   the right breast.  4. Mild subcutaneous fat stranding throughout the chest wall suggestive   of anasarca.

## 2018-02-19 DIAGNOSIS — I48.91 UNSPECIFIED ATRIAL FIBRILLATION: ICD-10-CM

## 2018-02-19 DIAGNOSIS — E78.5 HYPERLIPIDEMIA, UNSPECIFIED: ICD-10-CM

## 2018-02-19 DIAGNOSIS — I10 ESSENTIAL (PRIMARY) HYPERTENSION: ICD-10-CM

## 2018-02-19 DIAGNOSIS — E03.9 HYPOTHYROIDISM, UNSPECIFIED: ICD-10-CM

## 2018-02-19 DIAGNOSIS — R09.89 OTHER SPECIFIED SYMPTOMS AND SIGNS INVOLVING THE CIRCULATORY AND RESPIRATORY SYSTEMS: ICD-10-CM

## 2018-02-19 LAB
ANION GAP SERPL CALC-SCNC: 11 MMOL/L — SIGNIFICANT CHANGE UP (ref 5–17)
BASE EXCESS BLDA CALC-SCNC: 7.5 MMOL/L — HIGH (ref -2–3)
BUN SERPL-MCNC: 33 MG/DL — HIGH (ref 7–23)
CALCIUM SERPL-MCNC: 8.4 MG/DL — SIGNIFICANT CHANGE UP (ref 8.4–10.5)
CHLORIDE SERPL-SCNC: 101 MMOL/L — SIGNIFICANT CHANGE UP (ref 96–108)
CO2 SERPL-SCNC: 32 MMOL/L — HIGH (ref 22–31)
CREAT SERPL-MCNC: 1.01 MG/DL — SIGNIFICANT CHANGE UP (ref 0.5–1.3)
CULTURE RESULTS: NO GROWTH — SIGNIFICANT CHANGE UP
GAS PNL BLDA: SIGNIFICANT CHANGE UP
GLUCOSE SERPL-MCNC: 78 MG/DL — SIGNIFICANT CHANGE UP (ref 70–99)
HCO3 BLDA-SCNC: 34 MMOL/L — HIGH (ref 21–28)
HCT VFR BLD CALC: 40.8 % — SIGNIFICANT CHANGE UP (ref 34.5–45)
HGB BLD-MCNC: 12.5 G/DL — SIGNIFICANT CHANGE UP (ref 11.5–15.5)
MAGNESIUM SERPL-MCNC: 2.4 MG/DL — SIGNIFICANT CHANGE UP (ref 1.6–2.6)
MCHC RBC-ENTMCNC: 28.8 PG — SIGNIFICANT CHANGE UP (ref 27–34)
MCHC RBC-ENTMCNC: 30.6 G/DL — LOW (ref 32–36)
MCV RBC AUTO: 94 FL — SIGNIFICANT CHANGE UP (ref 80–100)
PCO2 BLDA: 55 MMHG — HIGH (ref 32–45)
PH BLDA: 7.4 — SIGNIFICANT CHANGE UP (ref 7.35–7.45)
PHOSPHATE SERPL-MCNC: 4.4 MG/DL — SIGNIFICANT CHANGE UP (ref 2.5–4.5)
PLATELET # BLD AUTO: 214 K/UL — SIGNIFICANT CHANGE UP (ref 150–400)
PO2 BLDA: 210 MMHG — HIGH (ref 83–108)
POTASSIUM SERPL-MCNC: 4 MMOL/L — SIGNIFICANT CHANGE UP (ref 3.5–5.3)
POTASSIUM SERPL-SCNC: 4 MMOL/L — SIGNIFICANT CHANGE UP (ref 3.5–5.3)
RBC # BLD: 4.34 M/UL — SIGNIFICANT CHANGE UP (ref 3.8–5.2)
RBC # FLD: 15.3 % — SIGNIFICANT CHANGE UP (ref 10.3–16.9)
SAO2 % BLDA: 100 % — SIGNIFICANT CHANGE UP (ref 95–100)
SODIUM SERPL-SCNC: 144 MMOL/L — SIGNIFICANT CHANGE UP (ref 135–145)
SPECIMEN SOURCE: SIGNIFICANT CHANGE UP
WBC # BLD: 10.8 K/UL — HIGH (ref 3.8–10.5)
WBC # FLD AUTO: 10.8 K/UL — HIGH (ref 3.8–10.5)

## 2018-02-19 PROCEDURE — 99291 CRITICAL CARE FIRST HOUR: CPT

## 2018-02-19 PROCEDURE — 93010 ELECTROCARDIOGRAM REPORT: CPT

## 2018-02-19 PROCEDURE — 71045 X-RAY EXAM CHEST 1 VIEW: CPT | Mod: 26

## 2018-02-19 RX ORDER — ASPIRIN/CALCIUM CARB/MAGNESIUM 324 MG
300 TABLET ORAL DAILY
Qty: 0 | Refills: 0 | Status: DISCONTINUED | OUTPATIENT
Start: 2018-02-19 | End: 2018-02-20

## 2018-02-19 RX ORDER — FUROSEMIDE 40 MG
40 TABLET ORAL ONCE
Qty: 0 | Refills: 0 | Status: COMPLETED | OUTPATIENT
Start: 2018-02-19 | End: 2018-02-19

## 2018-02-19 RX ADMIN — Medication 1 DROP(S): at 06:39

## 2018-02-19 RX ADMIN — Medication 3 MILLILITER(S): at 09:58

## 2018-02-19 RX ADMIN — Medication 250 MILLIGRAM(S): at 10:51

## 2018-02-19 RX ADMIN — Medication 3 MILLILITER(S): at 00:28

## 2018-02-19 RX ADMIN — Medication 1 DROP(S): at 22:26

## 2018-02-19 RX ADMIN — Medication 3 MILLILITER(S): at 18:31

## 2018-02-19 RX ADMIN — Medication 3 MILLILITER(S): at 22:15

## 2018-02-19 RX ADMIN — Medication 1 DROP(S): at 14:39

## 2018-02-19 RX ADMIN — PIPERACILLIN AND TAZOBACTAM 200 GRAM(S): 4; .5 INJECTION, POWDER, LYOPHILIZED, FOR SOLUTION INTRAVENOUS at 17:57

## 2018-02-19 RX ADMIN — Medication 3 MILLILITER(S): at 01:34

## 2018-02-19 RX ADMIN — Medication 3 MILLILITER(S): at 05:43

## 2018-02-19 RX ADMIN — Medication 300 MILLIGRAM(S): at 14:39

## 2018-02-19 RX ADMIN — PIPERACILLIN AND TAZOBACTAM 200 GRAM(S): 4; .5 INJECTION, POWDER, LYOPHILIZED, FOR SOLUTION INTRAVENOUS at 12:08

## 2018-02-19 RX ADMIN — Medication 40 MILLIGRAM(S): at 03:41

## 2018-02-19 RX ADMIN — HEPARIN SODIUM 5000 UNIT(S): 5000 INJECTION INTRAVENOUS; SUBCUTANEOUS at 14:39

## 2018-02-19 RX ADMIN — Medication 3 MILLILITER(S): at 13:41

## 2018-02-19 RX ADMIN — HEPARIN SODIUM 5000 UNIT(S): 5000 INJECTION INTRAVENOUS; SUBCUTANEOUS at 22:26

## 2018-02-19 RX ADMIN — PIPERACILLIN AND TAZOBACTAM 200 GRAM(S): 4; .5 INJECTION, POWDER, LYOPHILIZED, FOR SOLUTION INTRAVENOUS at 00:13

## 2018-02-19 RX ADMIN — HEPARIN SODIUM 5000 UNIT(S): 5000 INJECTION INTRAVENOUS; SUBCUTANEOUS at 06:39

## 2018-02-19 RX ADMIN — PIPERACILLIN AND TAZOBACTAM 200 GRAM(S): 4; .5 INJECTION, POWDER, LYOPHILIZED, FOR SOLUTION INTRAVENOUS at 06:40

## 2018-02-19 NOTE — PROVIDER CONTACT NOTE (OTHER) - ACTION/TREATMENT ORDERED:
Dr. Carey and Dr. Ng by the bedside. pt evaluated by MD.  Goal maintain MAP > 60 as per MD.   will continue to monitor

## 2018-02-19 NOTE — PROGRESS NOTE ADULT - PROBLEM SELECTOR PLAN 8
F: Holding fluids as pt fluid overloaded  E: Replete lyte with goal K>4 and Mg>2  N; NPO while on BIPAP  Code: DNR/DNI per discussion with pt son #Fluids - Holding fluids as pt fluid overloaded  #Electrolytes - monitor and replete for K>4 and Mg>2  #Diet - NPO while on BIPAP    #Code status - DNR/DNI per discussion with pt son, no invasive measures like A-lines or central lines or procedures, but ok with IV abx & diuresis - see chart note for full details   #DVT PPX - HSQ  #Dispo - further workup on CCU

## 2018-02-19 NOTE — PROGRESS NOTE ADULT - PROBLEM SELECTOR PLAN 5
Pt currently normotensive  -Will hold home metoprolol 12.5 BID, Lisinopril 2.5 mg Pt currently normotensive  -- Holding home metoprolol 12.5 BID, Lisinopril 2.5 mg in setting of sepsis

## 2018-02-19 NOTE — CONSULT NOTE ADULT - SUBJECTIVE AND OBJECTIVE BOX
SONJA JJ      Patient is a 89y old  Female who presents with a chief complaint of     HPI:  89 yo F with PMH CHF, HTN, HLD, hypothyroidism, osteoporosis PSHx triple bypass and mitral valve repair, presented from nursing/rehab center where she was found to be altered, tachypneic and in respiratory distress. In ED she was noted to be altered, tachypneic and tachycardic so she was placed on BIPAP. Vitals -126, /82, RR 17-27, SpO2 94% on BIPAP. CT head was negative for any acute pathology. EKG showed Aflutter with variable block, LAD, LVH with repolarization abnormality Labs were remarkable for Trop 0.02, pro-BNP 4552. VBG showed respiratory acidosis pH 7.23, pCO2 80, HCO3 33.  Bedside echo performed by cardio fellow showed EF 35-40%, LVH and hypokinetic RV. Chest X ray showed congestion and/or infiltrates with bilateral effusions. CTA PE protocol was ordered given hypoxia and signs of right heart failure. CTA was negative for PE, showed large bilateral pleural effusions and atelectasis of the bilateral lower lobes with patchy airspace opacities in the right upper lobe most likely representing pneumonia. Pt received 40 mg IV lasix and was transfered to CCU for further management. Upon arrival in the unit, the patient remained on BIPAP and altered, minimally responsive. (18 Feb 2018 16:13)      Addl  Medical issues:       HEALTH ISSUES - PROBLEM Dx:  Afib: Afib  Need for prophylactic measure: Need for prophylactic measure  Hyperlipidemia: Hyperlipidemia  Pneumonia: Pneumonia  Hypercapnic respiratory failure: Hypercapnic respiratory failure  Acute on chronic systolic congestive heart failure: Acute on chronic systolic congestive heart failure  Hypothyroid: Hypothyroid  Hypertension: Hypertension  Hypoxia: Hypoxia  Rapid atrial fibrillation: Rapid atrial fibrillation  Acute respiratory failure: Acute respiratory failure  Shortness of breath: Shortness of breath  Altered mental state: Altered mental state  Pulmonary edema: Pulmonary edema  Atelectasis: Atelectasis  Pleural effusion: Pleural effusion            MEDICATIONS  (STANDING):  ALBUTerol/ipratropium for Nebulization 3 milliLiter(s) Nebulizer every 4 hours  artificial  tears Solution 1 Drop(s) Both EYES three times a day  aspirin Suppository 300 milliGRAM(s) Rectal daily  atorvastatin 40 milliGRAM(s) Oral at bedtime  heparin  Injectable 5000 Unit(s) SubCutaneous every 8 hours  piperacillin/tazobactam IVPB. 3.375 Gram(s) IV Intermittent every 6 hours  vancomycin  IVPB 750 milliGRAM(s) IV Intermittent every 24 hours    MEDICATIONS  (PRN):          PAST MEDICAL & SURGICAL HISTORY:  Hyperlipidemia  Hypothyroid  Hypertension  Atherosclerosis  S/P CABG x 3      REVIEW OF SYSTEMS:  [x] As per HPI          Reviewed   no change                            Changes noted  CAD  ASHD  RESP FAILURE  [x] All others negative	  [*** ] Unable to obtain      Vital Signs Last 24 Hrs  T(C): 36.9 (19 Feb 2018 20:38), Max: 37.1 (19 Feb 2018 16:46)  T(F): 98.4 (19 Feb 2018 20:38), Max: 98.8 (19 Feb 2018 16:46)  HR: 106 (19 Feb 2018 22:03) (82 - 116)  BP: 94/48 (19 Feb 2018 22:00) (76/43 - 108/52)  BP(mean): 67 (19 Feb 2018 22:00) (57 - 77)  RR: 21 (19 Feb 2018 22:00) (12 - 27)  SpO2: 100% (19 Feb 2018 22:03) (97% - 100%)    PHYSICAL EXAM:      Constitutional:Lethargic    Eyes:    ENMT:    Neck:JVD    Breasts:d    Back:    Respiratory:bilat rhonchi    Cardiovascular:s1s2    Gastrointestinal:soft BS present    Genitourinary:    Rectal:    Extremities:pont movement    Vascular:    Neurological:    Skin:    Lymph Nodes:    Musculoskeletal:weakness    Psychiatric:altered MS                              12.5   10.8  )-----------( 214      ( 19 Feb 2018 09:52 )             40.8     02-19    144  |  101  |  33<H>  ----------------------------<  78  4.0   |  32<H>  |  1.01    Ca    8.4      19 Feb 2018 09:52  Phos  4.4     02-19  Mg     2.4     02-19    TPro  6.2  /  Alb  3.4  /  TBili  0.4  /  DBili  x   /  AST  11  /  ALT  30  /  AlkPhos  94  02-18    CARDIAC MARKERS ( 18 Feb 2018 11:08 )  x     / 0.02 ng/mL / 31 U/L / x     / 2.6 ng/mL      PT/INR - ( 18 Feb 2018 11:08 )   PT: 11.5 sec;   INR: 1.04          PTT - ( 18 Feb 2018 11:08 )  PTT:28.8 sec  CAPILLARY BLOOD GLUCOSE        Urinalysis Basic - ( 18 Feb 2018 11:12 )    Color: Yellow / Appearance: Clear / SG: >=1.030 / pH: x  Gluc: x / Ketone: NEGATIVE  / Bili: Negative / Urobili: 0.2 E.U./dL   Blood: x / Protein: 100 mg/dL / Nitrite: NEGATIVE   Leuk Esterase: NEGATIVE / RBC: < 5 /HPF / WBC < 5 /HPF   Sq Epi: x / Non Sq Epi: 0-5 /HPF / Bacteria: Present /HPF      I&O's Summary    18 Feb 2018 07:01  -  19 Feb 2018 07:00  --------------------------------------------------------  IN: 1050 mL / OUT: 3740 mL / NET: -2690 mL    19 Feb 2018 07:01  -  19 Feb 2018 22:57  --------------------------------------------------------  IN: 450 mL / OUT: 440 mL / NET: 10 mL            ASSESSMENT/PLAN/RECOMMENDATIONS

## 2018-02-19 NOTE — PROGRESS NOTE ADULT - ATTENDING COMMENTS
89F with Atrial Fibrillation not on A/C, Essential HTN, HLD, Hypothyroidism, Systolic Heart Failure (unknown LVEF), Diastolic Heart Failure who presents from [place] with report of lethargy. Patient found to be in hypercarbic respiratory failure in [facility] ED requiring BIPAP initiation. CXR and CT Chest c/w pulmonary edema, pleural effusions and infiltrate c/f pneumonia. Pt given Lasix 60mg IV x1 and 600cc IVFs in ED.  Patient admitted to CCU 2/18 for further management.     Vitals, labs, EKG and imaging reviewed 2/19  Exam notable for elderly frail female, sitting up in bed, BIPAP in place, JVP elevated 4cm above clavicle, irreg irreg rate and rhythm, tachycardic, diffuse crackles, no wheezing, flat abdomen, 1+ YANIRA, A&Ox0 - will shake head yes/no to simple questions    -Wean off BIPAP to HFNC   -Obtain ABG to monitor CO2 improvement  -Holding Lasix this AM given hypotension, pt net neg 3L   -V/Z for broad spectrum HCAP coverage (vanc trough due 2/21)  -Chest PT, attempt to obtain sputum sample for culture  -Holding BB for Afib rate control given sepsis  -SQ Hep ppx dose, no therapeutic A/C for Afib per d/w family  -Formal TTE pending  -Code status: DNR/DNI confirmed with son/HCP.  Per updated discussion with HCP, will not place central lines or perform invasive procedures. Will continue with supportive care and use of antibiotics and other intravenous medications.   -Palliative care consult given multiple co morbidities, extremely frail status, and acute decompensation    MD Sarah  CCU Attending

## 2018-02-19 NOTE — PROGRESS NOTE ADULT - SUBJECTIVE AND OBJECTIVE BOX
PUD [facility] ATTENDING    INTERVAL HPI/OVERNIGHT EVENTS:    on BiPAP in am,  pm on NC    PAST MEDICAL & SURGICAL HISTORY:  Hyperlipidemia  Hypothyroid  Hypertension  Atherosclerosis  S/P CABG x 3    FAMILY HISTORY:  No significant family history    SOCIAL HISTORY:    REVIEW OF SYSTEMS:  nonverbal    Vital Signs Last 24 Hrs  T(C): 36.7 (19 Feb 2018 12:08), Max: 36.8 (19 Feb 2018 01:18)  T(F): 98.1 (19 Feb 2018 12:08), Max: 98.2 (19 Feb 2018 01:18)  HR: 86 (19 Feb 2018 14:00) (82 - 116)  BP: 97/50 (19 Feb 2018 14:00) (73/40 - 107/54)  BP(mean): 70 (19 Feb 2018 14:00) (55 - 77)  RR: 13 (19 Feb 2018 14:00) (12 - 27)  SpO2: 100% (19 Feb 2018 14:00) (86% - 100%)    ABG - ( 19 Feb 2018 14:04 )  pH: 7.40  /  pCO2: 55    /  pO2: 210   / HCO3: 34    / Base Excess: 7.5   /  SaO2: 100       I&O's Detail    18 Feb 2018 07:01  -  19 Feb 2018 07:00  --------------------------------------------------------  IN:    IV PiggyBack: 350 mL    Sodium Chloride 0.9% IV Bolus: 700 mL  Total IN: 1050 mL    OUT:    Indwelling Catheter - Urethral: 3740 mL  Total OUT: 3740 mL    Total NET: -2690 mL      19 Feb 2018 07:01  -  19 Feb 2018 15:17  --------------------------------------------------------  IN:    IV PiggyBack: 350 mL  Total IN: 350 mL    OUT:    Indwelling Catheter - Urethral: 210 mL  Total OUT: 210 mL    Total NET: 140 mL    PHYSICAL EXAM:  malnourished, muscle atrophy, uncomfortable, - acute distress; vital signs are monitored continuously  Eyes: PERRLA, -conjunctivitis, -scleritis   Head: no focal deficit, normocephalic,  no trauma  ENMT: dry tongue, no thrush, -nasal discharge, -hoarseness, normal hearing, -cough, -hemoptysis, trachea midline  Neck: supple, - lymphadenopathy,  -masses, -JVD  Respiratory: bilateral diminished breath sounds, -wheezing, -rhonchi, + rales, -crackles  Chest: -accessory muscle use, -paradoxical breathing  Cardiovascular: irregular rate and AFLU, S1 S2 normal, -S3, -S4, -murmur, -gallop, -rub  Gastrointestinal: soft, nontender, nondistended, normal bowel sounds, no hepatosplenomegaly  Genitourinary: -flank pain, -dysuria  SORTO  Extremities: -clubbing, -cyanosis, -edema    Vascular: peripheral pulses palpable 2+ bilaterally  Neurological: awake, opening eyes, no focal deficit, -tremor   Skin: warm, dry, -erythema, iv site intact  Lymph nodes; no cervical, supraclavicular or axillary adenopathy  Psychiatric: cooperative    MEDICATIONS  (STANDING):  ALBUTerol/ipratropium for Nebulization 3 milliLiter(s) Nebulizer every 4 hours  artificial  tears Solution 1 Drop(s) Both EYES three times a day  aspirin Suppository 300 milliGRAM(s) Rectal daily  atorvastatin 40 milliGRAM(s) Oral at bedtime  heparin  Injectable 5000 Unit(s) SubCutaneous every 8 hours  piperacillin/tazobactam IVPB. 3.375 Gram(s) IV Intermittent every 6 hours  vancomycin  IVPB 750 milliGRAM(s) IV Intermittent every 24 hours    MEDICATIONS  (PRN):    Allergies    aminocaproic acid (Unknown)    Intolerances    LABS:                        12.5   10.8  )-----------( 214      ( 19 Feb 2018 09:52 )             40.8     02-19    144  |  101  |  33<H>  ----------------------------<  78  4.0   |  32<H>  |  1.01    Ca    8.4      19 Feb 2018 09:52  Phos  4.4     02-19  Mg     2.4     02-19    TPro  6.2  /  Alb  3.4  /  TBili  0.4  /  DBili  x   /  AST  11  /  ALT  30  /  AlkPhos  94  02-18  PT/INR - ( 18 Feb 2018 11:08 )   PT: 11.5 sec;   INR: 1.04     PTT - ( 18 Feb 2018 11:08 )  PTT:28.8 sec    Urinalysis Basic - ( 18 Feb 2018 11:12 )    Color: Yellow / Appearance: Clear / SG: >=1.030 / pH: x  Gluc: x / Ketone: NEGATIVE  / Bili: Negative / Urobili: 0.2 E.U./dL   Blood: x / Protein: 100 mg/dL / Nitrite: NEGATIVE   Leuk Esterase: NEGATIVE / RBC: < 5 /HPF / WBC < 5 /HPF   Sq Epi: x / Non Sq Epi: 0-5 /HPF / Bacteria: Present /HPF  +DVT prophylaxis 5000 q8  +Sleep    +Nutrition goals  NPO  -Pain  NOT OBVIOUS  -Decubital ulcer  +GI prophylaxis (PPV, coagulopathy, Hx)  +Aspiration prophylaxis (45 degrees)  Ventilator synchronized  BiPAP  Tracheal cuff pressure    ORONASAL INTERFACEE  +Sedation/analgesia stopped once  +ID (phos, CH, mupi, SB)  -Delirium  +Cardiac /ASA/statin  +Prevention  +Education  +Medication reviewed (drug-drug interactions, PDA)  Medical devices  Discussed with CCU, PGY, CCRN  DNR DNI  RADIOLOGY & ADDITIONAL STUDIES:    CXR today: significantly improved

## 2018-02-19 NOTE — PROGRESS NOTE ADULT - PROBLEM SELECTOR PLAN 7
Pt with Hx of hypothyroidism per documentation from rehab/nursing facility but no synthroid listed   -Will check thyroid function Pt with Hx of hypothyroidism per documentation from rehab/nursing facility but no synthroid listed   -- f/u thyroid function but interpret results cautiously in setting of acute illness

## 2018-02-19 NOTE — PROGRESS NOTE ADULT - PROBLEM SELECTOR PLAN 1
Echo demonstrating EF 50-55%, severely reduced LV filling> RV. CTA PE protocol showed cardiomegaly and post MVR, right cardiac chamber   enlargement suggesting right-sided congestive heart failure.  pro-BNP 4552.  -Trop 0.02, likely from demand ischemia. No need to trend as pt will have no escalation of care   -Will order official echo  -s/p 40 mg Lasix IV with adequate reponse: Urine output 2720 cc  -Will dose lasix 40 mg IV for tonight   -Goal urine out 3L, at least net neg 2L  -strick I/Os  -Daily weight  -Holding BB, ACE-I/ARB in the setting of sepsis Echo demonstrating EF 50-55%, severely reduced LV filling.  CTA PE protocol showed cardiomegaly and post MVR, right cardiac chamber  enlargement suggesting right-sided congestive heart failure.  pro-BNP 4552.  -- s/p 40 mg Lasix IV with adequate reponse: Urine output 2720 cc  -- Continue to dose lasix 40 mg IV for tonight   -Goal urine out 3L, at least net neg 2L  -strick I/Os  -Daily weight  -Holding BB, ACE-I/ARB in the setting of sepsis Echo demonstrating EF 50-55%, severely reduced LV filling.  CTA PE protocol showed cardiomegaly and post MVR, right cardiac chamber  enlargement suggesting right-sided congestive heart failure.  pro-BNP 4552.  -- s/p 40 mg Lasix IV with adequate reponse - net neg 2.5 past 24 h  -- Continue to dose lasix 40 mg as appropriate for continued diuresis    -- Goal urine out 3L, at least net neg 2L, was ~2.5 L past 24 h  -strick I/Os  -Daily weight  -Holding BB, ACE-I/ARB in the setting of sepsis

## 2018-02-19 NOTE — PROGRESS NOTE ADULT - PROBLEM SELECTOR PLAN 2
Likely 2/2 CHF exacerbation and HAP. CT chest with large bilateral pleural effusions, atelectasis. Chest  -VBG showed respiratory acidosis pH 7.23, pCO2 80, HCO3 33. Repeat ABG showed  minimal improvement pH: 7.26/pCO2: 75/pO2:135/HCO3: 34/SaO2: 99 on 40%FiO2 RR 8, IPAP14/EPAP5. BIPAP setting changed to IPAP 14, EPAP5, RR12, FiO2 40%  -Will repeat ABG 4 hours later and adjust BIPAP settings accordingly Likely 2/2 CHF exacerbation and HAP. CT chest with large bilateral pleural effusions, atelectasis. Chest Xray demonstrating some improvement today but still with singnificant pulmonary vascular congestion and effusions.  -- adjust BIPAP settings accordingly  -- f/u ABG   -- continue with diuresis as above

## 2018-02-19 NOTE — PROGRESS NOTE ADULT - PROBLEM SELECTOR PLAN 4
[id number] 5, however risk of anticoagulation outways benefits given pt's age and functional status  -Currently rate controlled on tele  -Holding on AC   -Daily EKG [id number] 5, however risk of anticoagulation outweighs benefits given pt's age and functional status  -- Currently rate controlled on tele  -- Holding on AC per GOC discussion above  -- Daily EKG and continuous hemodynamic monitoring

## 2018-02-19 NOTE — PROVIDER CONTACT NOTE (OTHER) - ASSESSMENT
HR 86 Atrial flutter RR 27 o2sat 100% on Bipap 20% oxygen  Patient alert and responds to voice.  pulses all present

## 2018-02-20 DIAGNOSIS — Z66 DO NOT RESUSCITATE: ICD-10-CM

## 2018-02-20 DIAGNOSIS — R54 AGE-RELATED PHYSICAL DEBILITY: ICD-10-CM

## 2018-02-20 DIAGNOSIS — I25.10 ATHEROSCLEROTIC HEART DISEASE OF NATIVE CORONARY ARTERY WITHOUT ANGINA PECTORIS: ICD-10-CM

## 2018-02-20 DIAGNOSIS — Z51.5 ENCOUNTER FOR PALLIATIVE CARE: ICD-10-CM

## 2018-02-20 LAB
ANION GAP SERPL CALC-SCNC: 9 MMOL/L — SIGNIFICANT CHANGE UP (ref 5–17)
BUN SERPL-MCNC: 36 MG/DL — HIGH (ref 7–23)
CALCIUM SERPL-MCNC: 8.9 MG/DL — SIGNIFICANT CHANGE UP (ref 8.4–10.5)
CHLORIDE SERPL-SCNC: 105 MMOL/L — SIGNIFICANT CHANGE UP (ref 96–108)
CO2 SERPL-SCNC: 34 MMOL/L — HIGH (ref 22–31)
CREAT SERPL-MCNC: 1.02 MG/DL — SIGNIFICANT CHANGE UP (ref 0.5–1.3)
GLUCOSE SERPL-MCNC: 71 MG/DL — SIGNIFICANT CHANGE UP (ref 70–99)
HCT VFR BLD CALC: 44.1 % — SIGNIFICANT CHANGE UP (ref 34.5–45)
HGB BLD-MCNC: 13.3 G/DL — SIGNIFICANT CHANGE UP (ref 11.5–15.5)
MAGNESIUM SERPL-MCNC: 2.7 MG/DL — HIGH (ref 1.6–2.6)
MCHC RBC-ENTMCNC: 28.4 PG — SIGNIFICANT CHANGE UP (ref 27–34)
MCHC RBC-ENTMCNC: 30.2 G/DL — LOW (ref 32–36)
MCV RBC AUTO: 94.2 FL — SIGNIFICANT CHANGE UP (ref 80–100)
PLATELET # BLD AUTO: 230 K/UL — SIGNIFICANT CHANGE UP (ref 150–400)
POTASSIUM SERPL-MCNC: 3.9 MMOL/L — SIGNIFICANT CHANGE UP (ref 3.5–5.3)
POTASSIUM SERPL-SCNC: 3.9 MMOL/L — SIGNIFICANT CHANGE UP (ref 3.5–5.3)
RBC # BLD: 4.68 M/UL — SIGNIFICANT CHANGE UP (ref 3.8–5.2)
RBC # FLD: 15.5 % — SIGNIFICANT CHANGE UP (ref 10.3–16.9)
SODIUM SERPL-SCNC: 148 MMOL/L — HIGH (ref 135–145)
WBC # BLD: 11 K/UL — HIGH (ref 3.8–10.5)
WBC # FLD AUTO: 11 K/UL — HIGH (ref 3.8–10.5)

## 2018-02-20 PROCEDURE — 99291 CRITICAL CARE FIRST HOUR: CPT

## 2018-02-20 PROCEDURE — 93010 ELECTROCARDIOGRAM REPORT: CPT

## 2018-02-20 PROCEDURE — 71045 X-RAY EXAM CHEST 1 VIEW: CPT | Mod: 26

## 2018-02-20 PROCEDURE — 99223 1ST HOSP IP/OBS HIGH 75: CPT | Mod: GC

## 2018-02-20 RX ORDER — ASPIRIN/CALCIUM CARB/MAGNESIUM 324 MG
81 TABLET ORAL DAILY
Qty: 0 | Refills: 0 | Status: DISCONTINUED | OUTPATIENT
Start: 2018-02-21 | End: 2018-02-28

## 2018-02-20 RX ORDER — POTASSIUM CHLORIDE 20 MEQ
20 PACKET (EA) ORAL ONCE
Qty: 0 | Refills: 0 | Status: COMPLETED | OUTPATIENT
Start: 2018-02-20 | End: 2018-02-20

## 2018-02-20 RX ORDER — PIPERACILLIN AND TAZOBACTAM 4; .5 G/20ML; G/20ML
3.38 INJECTION, POWDER, LYOPHILIZED, FOR SOLUTION INTRAVENOUS EVERY 6 HOURS
Qty: 0 | Refills: 0 | Status: COMPLETED | OUTPATIENT
Start: 2018-02-20 | End: 2018-02-22

## 2018-02-20 RX ORDER — SODIUM CHLORIDE 9 MG/ML
1000 INJECTION, SOLUTION INTRAVENOUS
Qty: 0 | Refills: 0 | Status: DISCONTINUED | OUTPATIENT
Start: 2018-02-20 | End: 2018-02-21

## 2018-02-20 RX ORDER — POTASSIUM CHLORIDE 20 MEQ
20 PACKET (EA) ORAL ONCE
Qty: 0 | Refills: 0 | Status: DISCONTINUED | OUTPATIENT
Start: 2018-02-20 | End: 2018-02-20

## 2018-02-20 RX ORDER — SODIUM CHLORIDE 9 MG/ML
250 INJECTION INTRAMUSCULAR; INTRAVENOUS; SUBCUTANEOUS
Qty: 0 | Refills: 0 | Status: DISCONTINUED | OUTPATIENT
Start: 2018-02-20 | End: 2018-02-20

## 2018-02-20 RX ORDER — METOPROLOL TARTRATE 50 MG
2.5 TABLET ORAL ONCE
Qty: 0 | Refills: 0 | Status: COMPLETED | OUTPATIENT
Start: 2018-02-20 | End: 2018-02-20

## 2018-02-20 RX ORDER — METOPROLOL TARTRATE 50 MG
6.25 TABLET ORAL EVERY 12 HOURS
Qty: 0 | Refills: 0 | Status: DISCONTINUED | OUTPATIENT
Start: 2018-02-20 | End: 2018-02-22

## 2018-02-20 RX ORDER — VANCOMYCIN HCL 1 G
750 VIAL (EA) INTRAVENOUS EVERY 24 HOURS
Qty: 0 | Refills: 0 | Status: DISCONTINUED | OUTPATIENT
Start: 2018-02-21 | End: 2018-02-21

## 2018-02-20 RX ADMIN — Medication 6.25 MILLIGRAM(S): at 23:31

## 2018-02-20 RX ADMIN — PIPERACILLIN AND TAZOBACTAM 200 GRAM(S): 4; .5 INJECTION, POWDER, LYOPHILIZED, FOR SOLUTION INTRAVENOUS at 06:00

## 2018-02-20 RX ADMIN — Medication 6.25 MILLIGRAM(S): at 12:34

## 2018-02-20 RX ADMIN — HEPARIN SODIUM 5000 UNIT(S): 5000 INJECTION INTRAVENOUS; SUBCUTANEOUS at 23:08

## 2018-02-20 RX ADMIN — Medication 3 MILLILITER(S): at 18:09

## 2018-02-20 RX ADMIN — ATORVASTATIN CALCIUM 40 MILLIGRAM(S): 80 TABLET, FILM COATED ORAL at 23:07

## 2018-02-20 RX ADMIN — PIPERACILLIN AND TAZOBACTAM 200 GRAM(S): 4; .5 INJECTION, POWDER, LYOPHILIZED, FOR SOLUTION INTRAVENOUS at 00:00

## 2018-02-20 RX ADMIN — Medication 1 DROP(S): at 23:07

## 2018-02-20 RX ADMIN — PIPERACILLIN AND TAZOBACTAM 200 GRAM(S): 4; .5 INJECTION, POWDER, LYOPHILIZED, FOR SOLUTION INTRAVENOUS at 12:42

## 2018-02-20 RX ADMIN — HEPARIN SODIUM 5000 UNIT(S): 5000 INJECTION INTRAVENOUS; SUBCUTANEOUS at 06:50

## 2018-02-20 RX ADMIN — Medication 2.5 MILLIGRAM(S): at 01:36

## 2018-02-20 RX ADMIN — Medication 20 MILLIEQUIVALENT(S): at 11:30

## 2018-02-20 RX ADMIN — Medication 3 MILLILITER(S): at 06:18

## 2018-02-20 RX ADMIN — Medication 300 MILLIGRAM(S): at 11:30

## 2018-02-20 RX ADMIN — Medication 250 MILLIGRAM(S): at 11:07

## 2018-02-20 RX ADMIN — PIPERACILLIN AND TAZOBACTAM 200 GRAM(S): 4; .5 INJECTION, POWDER, LYOPHILIZED, FOR SOLUTION INTRAVENOUS at 18:07

## 2018-02-20 RX ADMIN — HEPARIN SODIUM 5000 UNIT(S): 5000 INJECTION INTRAVENOUS; SUBCUTANEOUS at 13:55

## 2018-02-20 RX ADMIN — Medication 3 MILLILITER(S): at 10:41

## 2018-02-20 RX ADMIN — SODIUM CHLORIDE 125 MILLILITER(S): 9 INJECTION INTRAMUSCULAR; INTRAVENOUS; SUBCUTANEOUS at 08:30

## 2018-02-20 RX ADMIN — Medication 3 MILLILITER(S): at 01:51

## 2018-02-20 RX ADMIN — Medication 3 MILLILITER(S): at 23:07

## 2018-02-20 RX ADMIN — Medication 1 DROP(S): at 13:55

## 2018-02-20 RX ADMIN — Medication 1 DROP(S): at 06:50

## 2018-02-20 NOTE — PROGRESS NOTE ADULT - PROBLEM SELECTOR PLAN 3
Pt meeting sepsis criteria on admission (tachypnea, tachycardia and suspected PNA on CT chest). CT chest with patchy airspace opacities in the right upper lobe possibly representing pneumonia.  RVP negative.  -- Covering for HAP with Vanc 750 mg Q24hr and zosyn 3.375 g IV Q6  -- Next vanc trough 2/21 at noon  -- Last day of abx 2/22

## 2018-02-20 NOTE — PROGRESS NOTE ADULT - ATTENDING COMMENTS
89F with Atrial Fibrillation not on A/C, Essential HTN, HLD, Hypothyroidism, Chronic Diastolic Heart Failure LVEF 50-55%, who presents from [place] with report of lethargy. Patient found to be in hypercarbic respiratory failure in [facility] ED requiring BIPAP initiation. CXR and CT Chest c/w pulmonary edema, pleural effusions and infiltrate c/f pneumonia. Pt given Lasix 60mg IV x1 and 600cc IVFs in ED.  Patient admitted to CCU 2/18 for further management.     Vitals, labs, EKG and imaging reviewed 2/20  TTE 2/19: LVEF 50-55%, MV annuloplasty, MG 6mmHg  Exam notable for elderly frail female, sitting up in bed, NC in place, JVP elevated 3cm above clavicle, irreg irreg rate and rhythm, tachycardic, scant scattered crackles, no wheezing, no rhonchi, no cough, flat abdomen, trace  YANIRA, A&Ox1 (self) - however patient awake and alert this morning, answering appropriately to ROS questions which is much improved since admission    -NC during day, BIPAP at night  -Speech and swallow formal evaluation  -Initiate Metoprolol 6.25 BID elixer (home dose 12.5 BID)  -->NB: pt passed beside dysphagia screen, RNs comfortable giving liquids, will hold on pill administration until formal S&S eval  -V/Z for broad spectrum HCAP coverage (vanc trough due 2/21)  -Chest PT daily  -SQ Hep ppx dose, no therapeutic A/C for Afib per d/w family  -Physical therapy ordered, pt to be OOB daily to chair with assist  -Code status: DNR/DNI confirmed with son/HCP. NO central lines or invasive procedures per HCP. Continue with supportive care and use of antibiotics per d/w son.  -Palliative care consulted given multiple co morbidities, extremely frail status, and high risk morbidity/mortality  -Patient to transition to stepdown today for continued conservative manageashwin Smith MD  CCU Attending

## 2018-02-20 NOTE — DIETITIAN INITIAL EVALUATION ADULT. - PROBLEM SELECTOR PLAN 7
Pt with Hx of hypothyroidism per documentation from rehab/nursing facility but no synthroid listed   -Will check thyroid function

## 2018-02-20 NOTE — PROGRESS NOTE ADULT - PROBLEM SELECTOR PLAN 7
Pt with Hx of hypothyroidism per documentation from rehab/nursing facility but no synthroid listed   -- f/u thyroid function but interpret results cautiously in setting of acute illness

## 2018-02-20 NOTE — DIETITIAN INITIAL EVALUATION ADULT. - OTHER INFO
89 y/o female admitted with SOB.Negligible po from [facility].Noted palliative consult.No N/V/D or pain.Skin intact.No formal swallow consult completed.

## 2018-02-20 NOTE — PROGRESS NOTE ADULT - PROBLEM SELECTOR PLAN 7
Pt with Hx of hypothyroidism per documentation from rehab/nursing facility but no synthroid listed   -f/u TFT although patient is acutely ill -c/w Atorvastatin 40 mg daily

## 2018-02-20 NOTE — CONSULT NOTE ADULT - PROBLEM SELECTOR RECOMMENDATION 2
Multifactorial, sepsis from PNA + acute HF decompensation. Would further assess MS after optimization of medical treatment. Pending f/u with son for baseline MS. Multifactorial, sepsis from PNA + acute HF decompensation. Would further assess MS after optimization of medical treatment. Unclear if patient will be able to regain some of her MS, not at baseline, per son.

## 2018-02-20 NOTE — PROGRESS NOTE ADULT - PROBLEM SELECTOR PLAN 9
F: no IVF  E: K>4 Mg>2, monitor and replete as needed  N: NPO  Ppx: SQH  D: RMF  DNR/DNI: per discussion with pt son, no invasive measures like A-lines or central lines or procedures, but ok with IV abx & diuresis - see chart note for full details

## 2018-02-20 NOTE — PROGRESS NOTE ADULT - PROBLEM SELECTOR PLAN 4
[id number] 5, however risk of anticoagulation outweighs benefits given pt's age and functional status  -- Re-started metoprolol 6.25 mg PO BID for improved rate control, uptitrate to 12.5 mg PO BID as tolerated   -- Holding on AC per GOC discussion above  -- Daily EKG and continuous hemodynamic monitoring

## 2018-02-20 NOTE — PROGRESS NOTE ADULT - PROBLEM SELECTOR PLAN 5
Pt currently normotensive  -- Restarted metoprolol 6.25 mg PO BID, uptitrate to 12.5 PO BID as HD able  -- Holding lisinopril 2.5 mg in setting of sepsis, but consider restarting if hypertensive

## 2018-02-20 NOTE — PROGRESS NOTE ADULT - SUBJECTIVE AND OBJECTIVE BOX
OVERNIGHT EVENTS:    SUBJECTIVE / INTERVAL HPI: Patient seen and examined at bedside.     VITAL SIGNS:  Vital Signs Last 24 Hrs  T(C): 37.1 (20 Feb 2018 05:51), Max: 37.1 (19 Feb 2018 16:46)  T(F): 98.7 (20 Feb 2018 05:51), Max: 98.8 (19 Feb 2018 16:46)  HR: 116 (20 Feb 2018 06:16) (83 - 122)  BP: 134/81 (20 Feb 2018 05:00) (76/43 - 135/63)  BP(mean): 99 (20 Feb 2018 05:00) (57 - 99)  RR: 13 (20 Feb 2018 05:00) (12 - 27)  SpO2: 96% (20 Feb 2018 06:16) (93% - 100%)    PHYSICAL EXAM:    General: WDWN  HEENT: NC/AT; PERRL, anicteric sclera; MMM  Neck: supple  Cardiovascular: +S1/S2; RRR  Respiratory: CTA B/L; no W/R/R  Gastrointestinal: soft, NT/ND; +BSx4  Extremities: WWP; no edema, clubbing or cyanosis  Vascular: 2+ radial, DP/PT pulses B/L  Neurological: AAOx3; no focal deficits    MEDICATIONS:  MEDICATIONS  (STANDING):  ALBUTerol/ipratropium for Nebulization 3 milliLiter(s) Nebulizer every 4 hours  artificial  tears Solution 1 Drop(s) Both EYES three times a day  aspirin Suppository 300 milliGRAM(s) Rectal daily  atorvastatin 40 milliGRAM(s) Oral at bedtime  heparin  Injectable 5000 Unit(s) SubCutaneous every 8 hours  piperacillin/tazobactam IVPB. 3.375 Gram(s) IV Intermittent every 6 hours  vancomycin  IVPB 750 milliGRAM(s) IV Intermittent every 24 hours    MEDICATIONS  (PRN):      ALLERGIES:  Allergies    aminocaproic acid (Unknown)    Intolerances        LABS:                        12.5   10.8  )-----------( 214      ( 19 Feb 2018 09:52 )             40.8     02-19    144  |  101  |  33<H>  ----------------------------<  78  4.0   |  32<H>  |  1.01    Ca    8.4      19 Feb 2018 09:52  Phos  4.4     02-19  Mg     2.4     02-19    TPro  6.2  /  Alb  3.4  /  TBili  0.4  /  DBili  x   /  AST  11  /  ALT  30  /  AlkPhos  94  02-18    PT/INR - ( 18 Feb 2018 11:08 )   PT: 11.5 sec;   INR: 1.04          PTT - ( 18 Feb 2018 11:08 )  PTT:28.8 sec  Urinalysis Basic - ( 18 Feb 2018 11:12 )    Color: Yellow / Appearance: Clear / SG: >=1.030 / pH: x  Gluc: x / Ketone: NEGATIVE  / Bili: Negative / Urobili: 0.2 E.U./dL   Blood: x / Protein: 100 mg/dL / Nitrite: NEGATIVE   Leuk Esterase: NEGATIVE / RBC: < 5 /HPF / WBC < 5 /HPF   Sq Epi: x / Non Sq Epi: 0-5 /HPF / Bacteria: Present /HPF      CAPILLARY BLOOD GLUCOSE      POCT Blood Glucose.: 91 mg/dL (18 Feb 2018 20:20)      RADIOLOGY & ADDITIONAL TESTS: Reviewed.    ASSESSMENT:    PLAN: CCU TRANSFER NOTE    HOSPITAL COURSE:  Ms. Sosa is a 89 year-old F with a PMH of CAD (s/p 3-vessel CABG), MV repair, sCHF, HTN, HLD, hypothyroidism, and osteoporosis who presented from nursing/rehab center where she was found to be altered, tachypneic and in respiratory distress, admitted to the CCU for CHF exacerbation and hypoxic hypercapnic respiratory failure. In [facility] ED she was placed on BIPAP. Vitals -126, /82, RR 17-27, SpO2 94% on BIPAP. CT head was negative for any acute pathology. EKG showed Aflutter with variable block, LAD, LVH with repolarization abnormality Labs were remarkable for Trop 0.02, pro-BNP 4552. VBG showed respiratory acidosis pH 7.23, pCO2 80, HCO3 33.  Bedside echo performed by cardio fellow showed EF 35-40%, LVH and hypokinetic RV. Chest X ray showed congestion and/or infiltrates with bilateral effusions. CTA PE protocol was ordered given hypoxia and signs of right heart failure. CTA was negative for PE, showed large bilateral pleural effusions and atelectasis of the bilateral lower lobes with patchy airspace opacities in the right upper lobe most likely representing pneumonia.     After coming to CCU, she was diuresed on an as-needed basis to a goal of 2 to 3 L net negative per day, which was achieved.  She remained on BiPAP for over 24 hours.  She was also started on antibiotics for was was thought to be hospital acquired PNA.  She will remain on vanc/zosyn for a five day course, finishing on 2/22.  Her next vanc trough is before the dose on the 21st.  Her mental status, which remained poor after admission, slowly started to improve.  She was weaned from BiPAP on the 19th and was able to tolerate 2 L NC with no difficulty.  She no longer needed additional diuresis.  She passed her bedside dypshagia.  She was given lopressor overnight into the 20th because of increasing HR into 120s.  Since she was no longer septic, she was re-started on metoprolol at half her home dose (via elixer) with the goal of increasing to 12.5 BID by mouth.  She was seen by palliative care, who plan to reach out to the patient's son for a conversation regarding goals of care.  Her mental status continued to improve, and she was deemed stable for step down to regional medical floors.   OVERNIGHT EVENTS: Patient with HR into 120s requiring one IV push of lopressor 2.5 mg.  Otherwise hemodynamically stable     SUBJECTIVE / INTERVAL HPI: Patient seen and examined at bedside.  She is incrementally more interactive this morning than previously, responding yes/no appropriately to questioning, and even saying a few words about how she is feeling ("no pain").     VITAL SIGNS:  Vital Signs Last 24 Hrs  T(C): 37.1 (20 Feb 2018 05:51), Max: 37.1 (19 Feb 2018 16:46)  T(F): 98.7 (20 Feb 2018 05:51), Max: 98.8 (19 Feb 2018 16:46)  HR: 116 (20 Feb 2018 06:16) (83 - 122)  BP: 134/81 (20 Feb 2018 05:00) (76/43 - 135/63)  BP(mean): 99 (20 Feb 2018 05:00) (57 - 99)  RR: 13 (20 Feb 2018 05:00) (12 - 27)  SpO2: 96% (20 Feb 2018 06:16) (93% - 100%)    PHYSICAL EXAM:    General: elderly, frail, comfortable, NAD.  [place] but arousable to loud verbal stimuli  HEENT: NC/AT; EOMI, anicteric sclera; MMM  Neck: supple, no JVD  Cardiovascular: Regular rate & rhythm, no murmurs, gallops, rubs   Respiratory: Lungs clear to auscultation b/l, no wheezes, crackles, rhonchi   Gastrointestinal: +BS, soft, moderate distention, no tenderness   Extremities: WWP; no edema  Vascular: 2+ radial  Neurological: Somewhat more alert today than previously, awakens to loud verbal stimuli, answers yes/no questions appropriately, and uses some words.    MEDICATIONS:  MEDICATIONS  (STANDING):  ALBUTerol/ipratropium for Nebulization 3 milliLiter(s) Nebulizer every 4 hours  artificial  tears Solution 1 Drop(s) Both EYES three times a day  aspirin Suppository 300 milliGRAM(s) Rectal daily  atorvastatin 40 milliGRAM(s) Oral at bedtime  heparin  Injectable 5000 Unit(s) SubCutaneous every 8 hours  piperacillin/tazobactam IVPB. 3.375 Gram(s) IV Intermittent every 6 hours  vancomycin  IVPB 750 milliGRAM(s) IV Intermittent every 24 hours    MEDICATIONS  (PRN):      ALLERGIES:  Allergies    aminocaproic acid (Unknown)    Intolerances        LABS:                        12.5   10.8  )-----------( 214      ( 19 Feb 2018 09:52 )             40.8     02-19    144  |  101  |  33<H>  ----------------------------<  78  4.0   |  32<H>  |  1.01    Ca    8.4      19 Feb 2018 09:52  Phos  4.4     02-19  Mg     2.4     02-19    TPro  6.2  /  Alb  3.4  /  TBili  0.4  /  DBili  x   /  AST  11  /  ALT  30  /  AlkPhos  94  02-18    PT/INR - ( 18 Feb 2018 11:08 )   PT: 11.5 sec;   INR: 1.04          PTT - ( 18 Feb 2018 11:08 )  PTT:28.8 sec  Urinalysis Basic - ( 18 Feb 2018 11:12 )    Color: Yellow / Appearance: Clear / SG: >=1.030 / pH: x  Gluc: x / Ketone: NEGATIVE  / Bili: Negative / Urobili: 0.2 E.U./dL   Blood: x / Protein: 100 mg/dL / Nitrite: NEGATIVE   Leuk Esterase: NEGATIVE / RBC: < 5 /HPF / WBC < 5 /HPF   Sq Epi: x / Non Sq Epi: 0-5 /HPF / Bacteria: Present /HPF      CAPILLARY BLOOD GLUCOSE      POCT Blood Glucose.: 91 mg/dL (18 Feb 2018 20:20)

## 2018-02-20 NOTE — PROGRESS NOTE ADULT - PROBLEM SELECTOR PLAN 6
-c/w Atorvastatin 40 mg daily Pt currently normotensive  -Holding home metoprolol 12.5 BID, Lisinopril 2.5 mg in setting of sepsis

## 2018-02-20 NOTE — PROGRESS NOTE ADULT - SUBJECTIVE AND OBJECTIVE BOX
[facility] ATTENDING    INTERVAL HPI/OVERNIGHT EVENTS:    off BiPAP    PAST MEDICAL & SURGICAL HISTORY:  Hyperlipidemia  Hypothyroid  Hypertension  Atherosclerosis  S/P CABG x 3    FAMILY HISTORY:  No significant family history    SOCIAL HISTORY:    REVIEW OF SYSTEMS:  nonverbal    Vital Signs Last 24 Hrs  T(C): 36.4 (20 Feb 2018 12:22), Max: 37.1 (19 Feb 2018 16:46)  T(F): 97.6 (20 Feb 2018 12:22), Max: 98.8 (19 Feb 2018 16:46)  HR: 90 (20 Feb 2018 14:00) (84 - 122)  BP: 109/51 (20 Feb 2018 14:00) (91/43 - 135/63)  BP(mean): 66 (20 Feb 2018 14:00) (64 - 116)  RR: 22 (20 Feb 2018 14:00) (12 - 26)  SpO2: 100% (20 Feb 2018 14:00) (91% - 100%)    ABG - ( 19 Feb 2018 14:04 )  pH: 7.40  /  pCO2: 55    /  pO2: 210   / HCO3: 34    / Base Excess: 7.5   /  SaO2: 100       I&O's Detail    19 Feb 2018 07:01  -  20 Feb 2018 07:00  --------------------------------------------------------  IN:    IV PiggyBack: 650 mL  Total IN: 650 mL    OUT:    Indwelling Catheter - Urethral: 810 mL  Total OUT: 810 mL    Total NET: -160 mL    20 Feb 2018 07:01  -  20 Feb 2018 15:05  --------------------------------------------------------  IN:    IV PiggyBack: 250 mL    Oral Fluid: 240 mL    sodium chloride 0.9%.: 250 mL    Solution: 100 mL  Total IN: 840 mL    OUT:    Indwelling Catheter - Urethral: 205 mL  Total OUT: 205 mL    Total NET: 635 mL    PHYSICAL EXAM:  in bed, nonverbal, "wants to sleep"  Malnourished, muscle atrophy, uncomfortable, - acute distress; vital signs are monitored continuously  Eyes: PERRLA CONFIRMED, -conjunctivitis, -scleritis   Head: no focal deficit, normocephalic,  no trauma  ENMT: moist tongue, no thrush, -nasal discharge, -hoarseness, normal hearing, -cough, -hemoptysis, trachea midline  Neck: supple, - lymphadenopathy,  -masses, -JVD  Respiratory: bilateral diminished breath sounds, -wheezing, -rhonchi, -rales, -crackles  Chest: -accessory muscle use, -paradoxical breathing  Cardiovascular: irregular rate and A FLU, S1 S2 normal, -S3, -S4, -murmur, -gallop, -rub  Gastrointestinal: soft, nontender, nondistended, normal bowel sounds, no hepatosplenomegaly  Genitourinary: -flank pain, -dysuria  Extremities: -clubbing, -cyanosis, -edema    Vascular: peripheral pulses palpable 2+ bilaterally  Neurological: awake/sleeping, oriented x 0, no focal deficit, -tremor   Skin: warm, dry, -erythema, iv sites intact  Lymph nodes; no cervical, supraclavicular or axillary adenopathy  Psychiatric: cooperative, appropriate mood    MEDICATIONS  (STANDING):  ALBUTerol/ipratropium for Nebulization 3 milliLiter(s) Nebulizer every 4 hours  artificial  tears Solution 1 Drop(s) Both EYES three times a day  aspirin Suppository 300 milliGRAM(s) Rectal daily  atorvastatin 40 milliGRAM(s) Oral at bedtime  heparin  Injectable 5000 Unit(s) SubCutaneous every 8 hours  metoprolol      tartrate Suspension 6.25 milliGRAM(s) Oral every 12 hours  piperacillin/tazobactam IVPB. 3.375 Gram(s) IV Intermittent every 6 hours  sodium chloride 0.9%. 250 milliLiter(s) (125 mL/Hr) IV Continuous <Continuous>    MEDICATIONS  (PRN):      Allergies    aminocaproic acid (Unknown)    Intolerances        LABS:                        13.3   11.0  )-----------( 230      ( 20 Feb 2018 06:40 )             44.1     02-20    148<H>  |  105  |  36<H>  ----------------------------<  71  3.9   |  34<H>  |  1.02    Ca    8.9      20 Feb 2018 06:40  Phos  4.4     02-19  Mg     2.7     02-20  +DVT prophylaxis  5000 q8  +Sleep  YES  +Nutrition goals  NPO  -Pain  NOT OBVIOUS  -Decubital ulcer  +GI prophylaxis (PPV, coagulopathy, Hx)  +Aspiration prophylaxis (45 degrees)  Ventilator synchronized  BiPAP at bedside  Tracheal cuff pressure  +Sedation/analgesia stopped once  +ID (phos, CH, mupi, SB)  -Delirium  +Cardiac Beta//ASA/statin  +Prevention  +Education  +Medication reviewed (drug-drug interactions, PDA)  Medical devices  F IV NC  Discussed with CCU, PGY, CCRN    RADIOLOGY & ADDITIONAL STUDIES:  CXR: improved, bilat eff, RUL infiltrate

## 2018-02-20 NOTE — PROGRESS NOTE ADULT - SUBJECTIVE AND OBJECTIVE BOX
SUBJECTIVE / INTERVAL HPI: comfortable    VITAL SIGNS:  Vital Signs Last 24 Hrs  T(C): 37.1 (20 Feb 2018 05:51), Max: 37.1 (19 Feb 2018 16:46)  T(F): 98.7 (20 Feb 2018 05:51), Max: 98.8 (19 Feb 2018 16:46)  HR: 116 (20 Feb 2018 06:16) (83 - 122)  BP: 134/81 (20 Feb 2018 05:00) (76/43 - 135/63)  BP(mean): 99 (20 Feb 2018 05:00) (57 - 99)  RR: 13 (20 Feb 2018 05:00) (12 - 27)  SpO2: 96% (20 Feb 2018 06:16) (93% - 100%)    PHYSICAL EXAM:    General: WDWN  HEENT: NC/AT; PERRL, anicteric sclera; MMM  Neck: supple  Cardiovascular: +S1/S2; RRR  Respiratory: CTA B/L; no W/R/R  Gastrointestinal: soft, NT/ND; +BSx4  Extremities: WWP; no edema, clubbing or cyanosis  Vascular: 2+ radial, DP/PT pulses B/L  Neurological: Awake    MEDICATIONS:  MEDICATIONS  (STANDING):  ALBUTerol/ipratropium for Nebulization 3 milliLiter(s) Nebulizer every 4 hours  artificial  tears Solution 1 Drop(s) Both EYES three times a day  aspirin Suppository 300 milliGRAM(s) Rectal daily  atorvastatin 40 milliGRAM(s) Oral at bedtime  heparin  Injectable 5000 Unit(s) SubCutaneous every 8 hours  piperacillin/tazobactam IVPB. 3.375 Gram(s) IV Intermittent every 6 hours  vancomycin  IVPB 750 milliGRAM(s) IV Intermittent every 24 hours    MEDICATIONS  (PRN):      ALLERGIES:  Allergies    aminocaproic acid (Unknown)    Intolerances        LABS:                        12.5   10.8  )-----------( 214      ( 19 Feb 2018 09:52 )             40.8     02-19    144  |  101  |  33<H>  ----------------------------<  78  4.0   |  32<H>  |  1.01    Ca    8.4      19 Feb 2018 09:52  Phos  4.4     02-19  Mg     2.4     02-19    TPro  6.2  /  Alb  3.4  /  TBili  0.4  /  DBili  x   /  AST  11  /  ALT  30  /  AlkPhos  94  02-18    PT/INR - ( 18 Feb 2018 11:08 )   PT: 11.5 sec;   INR: 1.04          PTT - ( 18 Feb 2018 11:08 )  PTT:28.8 sec  Urinalysis Basic - ( 18 Feb 2018 11:12 )    Color: Yellow / Appearance: Clear / SG: >=1.030 / pH: x  Gluc: x / Ketone: NEGATIVE  / Bili: Negative / Urobili: 0.2 E.U./dL   Blood: x / Protein: 100 mg/dL / Nitrite: NEGATIVE   Leuk Esterase: NEGATIVE / RBC: < 5 /HPF / WBC < 5 /HPF   Sq Epi: x / Non Sq Epi: 0-5 /HPF / Bacteria: Present /HPF      CAPILLARY BLOOD GLUCOSE      POCT Blood Glucose.: 91 mg/dL (18 Feb 2018 20:20)      RADIOLOGY & ADDITIONAL TESTS: Reviewed.

## 2018-02-20 NOTE — PROGRESS NOTE ADULT - PROBLEM SELECTOR PLAN 8
#Fluids - Gentle D5 IVF bolus if hypernatremia persists   #Electrolytes - monitor and replete for K>4 and Mg>2  #Diet - NPO while on BIPAP, passed dysphagia, awaiting speech & swallow    #Code status - DNR/DNI per discussion with pt son, no invasive measures like A-lines or central lines or procedures, but ok with IV abx & diuresis - see chart note for full details.  Palliative care following, appreciate recs   #DVT PPX - HSQ  #Dispo - stepdown to [facility]

## 2018-02-20 NOTE — DIETITIAN INITIAL EVALUATION ADULT. - PROBLEM SELECTOR PLAN 1
Bedside echo performed by cardio fellow showed EF 35-40%, LVH and hypokinetic RV. CTA PE protocol showed cardiomegaly and post MVR, right cardiac chamber   enlargement suggesting right-sided congestive heart failure.  pro-BNP 4552.  -Trop 0.02, likely from demand ischemia. No need to trend as pt will have no escalation of care   -Will order official echo  -s/p 40 mg Lasix IV with adequate reponse: Urine output 2720 cc  -Will dose lasix 40 mg IV for tonight   -Goal urine out 3L, at least net neg 2L  -strick I/Os  -Daily weight  -Holding BB, ACE-I/ARB in the setting of sepsis

## 2018-02-20 NOTE — CONSULT NOTE ADULT - PROBLEM SELECTOR RECOMMENDATION 6
Patient is DNR/DNI. HCP (no documentation in the chart) - Main decision maker is son Germán Sosa. Already has [facility]OLST in chart (filled out 2/18).   As discussed during the palliative IDT meeting and as identified during the patients PSSA screening the patient would benefit from: Palliative Med , Unit SW/Case Mgmt, Patient/Family Support, Massage Therapy, Music Therapy.

## 2018-02-20 NOTE — PROGRESS NOTE ADULT - PROBLEM SELECTOR PLAN 2
2/2 CHF exacerbation and HAP. CT chest with large bilateral pleural effusions, atelectasis.   -BiPAP at night  -currently sating well on 2L NC

## 2018-02-20 NOTE — PROGRESS NOTE ADULT - SUBJECTIVE AND OBJECTIVE BOX
CCU to 7U Resident Transfer Note       INTERVAL HPI/OVERNIGHT EVENTS:  Episodes of Afib RVR which responded to lopressor    Vital Signs Last 24 Hrs  T(C): 36.4 (20 Feb 2018 12:22), Max: 37.1 (19 Feb 2018 16:46)  T(F): 97.6 (20 Feb 2018 12:22), Max: 98.8 (19 Feb 2018 16:46)  HR: 84 (20 Feb 2018 15:10) (84 - 122)  BP: 105/49 (20 Feb 2018 15:10) (90/42 - 135/63)  BP(mean): 72 (20 Feb 2018 15:10) (61 - 116)  ABP: --  ABP(mean): --  RR: 23 (20 Feb 2018 15:10) (12 - 26)  SpO2: 98% (20 Feb 2018 15:10) (91% - 100%)    PHYSICAL EXAM:  Constitutional: NAD, lying in bed, elderly, frail, somnolent  HEENT: no eye discharge, no nasal discharge, no pharyngeal erythema, dry membranes   Neck: no lymphadenopathy, no JVD,  no carotid bruit  Cardiovascular: S1 and S2, irregular irregular,  2/6 KOFI loudest over right 2nd intercostal, R/G, non-displaced PMI  Respiratory: no wheezing, no rhonchi, no cough, decreased breath sounds over right RLL, no crackles   Gastrointestinal: BS+, soft, non-distended, non-tender, no ascites  : stockton catheter draining yellow urine   Extremities: warm to touch, no edema  Vascular: 2+ peripheral pulses  Neurological: somnolent and non following commands but opens eyes, sluggish responsive pupils, unable to assess strength and sensation testing  Musculoskeletal: no joint swelling  Skin: No rashes, no skin breakdown    MEDICATIONS  (STANDING):  ALBUTerol/ipratropium for Nebulization 3 milliLiter(s) Nebulizer every 4 hours  artificial  tears Solution 1 Drop(s) Both EYES three times a day  aspirin Suppository 300 milliGRAM(s) Rectal daily  atorvastatin 40 milliGRAM(s) Oral at bedtime  heparin  Injectable 5000 Unit(s) SubCutaneous every 8 hours  metoprolol      tartrate Suspension 6.25 milliGRAM(s) Oral every 12 hours  piperacillin/tazobactam IVPB. 3.375 Gram(s) IV Intermittent every 6 hours  sodium chloride 0.9%. 250 milliLiter(s) (125 mL/Hr) IV Continuous <Continuous>    MEDICATIONS  (PRN):      Allergies    aminocaproic acid (Unknown)    Intolerances        LABS:                        13.3   11.0  )-----------( 230      ( 20 Feb 2018 06:40 )             44.1     02-20    148<H>  |  105  |  36<H>  ----------------------------<  71  3.9   |  34<H>  |  1.02    Ca    8.9      20 Feb 2018 06:40  Phos  4.4     02-19  Mg     2.7     02-20            RADIOLOGY & ADDITIONAL TESTS: Reviewed CCU to 7U Resident Transfer Note   89F PMH of CAD (s/p 3-vessel CABG), MV repair, HFpEF, HTN, HLD, hypothyroidism, and osteoporosis who presented from nursing/rehab center where she was found to be altered, tachypneic and in respiratory distress. She was placed on BIPAP. Vitals -126, /82, RR 17-27, SpO2 94% on BIPAP. CT head was negative for any acute pathology. EKG showed Aflutter with variable block, LAD, LVH with repolarization abnormality Labs were remarkable for Trop 0.02, pro-BNP 4552. VBG showed respiratory acidosis pH 7.23, pCO2 80, HCO3 33.  Bedside echo performed by cardio fellow showed EF 35-40%, LVH and hypokinetic RV. Chest X ray showed congestion and/or infiltrates with bilateral effusions. CTA PE protocol was ordered given hypoxia and signs of right heart failure. CTA was negative for PE, showed large bilateral pleural effusions and atelectasis of the bilateral lower lobes with patchy airspace opacities in the right upper lobe most likely representing pneumonia. Pt received 40 mg IV lasix and was transfered to CCU for further management. Upon arrival in the unit, the patient remained on BIPAP and altered, minimally responsive.    INTERVAL HPI/OVERNIGHT EVENTS:  Episodes of Afib RVR which responded to lopressor    Vital Signs Last 24 Hrs  T(C): 36.4 (20 Feb 2018 12:22), Max: 37.1 (19 Feb 2018 16:46)  T(F): 97.6 (20 Feb 2018 12:22), Max: 98.8 (19 Feb 2018 16:46)  HR: 84 (20 Feb 2018 15:10) (84 - 122)  BP: 105/49 (20 Feb 2018 15:10) (90/42 - 135/63)  BP(mean): 72 (20 Feb 2018 15:10) (61 - 116)  ABP: --  ABP(mean): --  RR: 23 (20 Feb 2018 15:10) (12 - 26)  SpO2: 98% (20 Feb 2018 15:10) (91% - 100%)    PHYSICAL EXAM:  Constitutional: NAD, lying in bed, elderly, frail, somnolent  HEENT: no eye discharge, no nasal discharge, no pharyngeal erythema, dry membranes   Neck: no lymphadenopathy, no JVD,  no carotid bruit  Cardiovascular: S1 and S2, irregular irregular,  2/6 KOFI loudest over right 2nd intercostal, R/G, non-displaced PMI  Respiratory: no wheezing, no rhonchi, no cough, decreased breath sounds over right RLL, no crackles   Gastrointestinal: BS+, soft, non-distended, non-tender, no ascites  : stockton catheter draining yellow urine   Extremities: warm to touch, no edema  Vascular: 2+ peripheral pulses  Neurological: somnolent and non following commands but opens eyes, sluggish responsive pupils, unable to assess strength and sensation testing  Musculoskeletal: no joint swelling  Skin: No rashes, no skin breakdown    MEDICATIONS  (STANDING):  ALBUTerol/ipratropium for Nebulization 3 milliLiter(s) Nebulizer every 4 hours  artificial  tears Solution 1 Drop(s) Both EYES three times a day  aspirin Suppository 300 milliGRAM(s) Rectal daily  atorvastatin 40 milliGRAM(s) Oral at bedtime  heparin  Injectable 5000 Unit(s) SubCutaneous every 8 hours  metoprolol      tartrate Suspension 6.25 milliGRAM(s) Oral every 12 hours  piperacillin/tazobactam IVPB. 3.375 Gram(s) IV Intermittent every 6 hours  sodium chloride 0.9%. 250 milliLiter(s) (125 mL/Hr) IV Continuous <Continuous>    MEDICATIONS  (PRN):      Allergies    aminocaproic acid (Unknown)    Intolerances        LABS:                        13.3   11.0  )-----------( 230      ( 20 Feb 2018 06:40 )             44.1     02-20    148<H>  |  105  |  36<H>  ----------------------------<  71  3.9   |  34<H>  |  1.02    Ca    8.9      20 Feb 2018 06:40  Phos  4.4     02-19  Mg     2.7     02-20            RADIOLOGY & ADDITIONAL TESTS: Reviewed CCU to 7U Resident Transfer Note   89F PMH of CAD (s/p 3-vessel CABG), MV repair, HFpEF, HTN, HLD, hypothyroidism, and osteoporosis who presented from nursing/rehab center where she was found to be altered, tachypneic and in respiratory distress. She was placed on BIPAP. Vitals -126, /82, RR 17-27, SpO2 94% on BIPAP. CT head was negative for any acute pathology. EKG showed Aflutter with variable block, LAD, LVH with repolarization abnormality Labs were remarkable for Trop 0.02, pro-BNP 4552. VBG showed respiratory acidosis pH 7.23, pCO2 80, HCO3 33.  Bedside echo performed by cardio fellow showed EF 35-40%, LVH and hypokinetic RV. Chest X ray showed congestion and/or infiltrates with bilateral effusions. CTA PE protocol was ordered given hypoxia and signs of right heart failure. CTA was negative for PE, showed large bilateral pleural effusions and atelectasis of the bilateral lower lobes with patchy airspace opacities in the right upper lobe most likely representing pneumonia. Pt received 40 mg IV lasix and was transfered to CCU for further management. Upon arrival in the unit, the patient remained on BIPAP and altered, minimally responsive. Formal Echo showed EF 60%    INTERVAL HPI/OVERNIGHT EVENTS:  Episodes of Afib RVR which responded to lopressor    Vital Signs Last 24 Hrs  T(C): 36.4 (20 Feb 2018 12:22), Max: 37.1 (19 Feb 2018 16:46)  T(F): 97.6 (20 Feb 2018 12:22), Max: 98.8 (19 Feb 2018 16:46)  HR: 84 (20 Feb 2018 15:10) (84 - 122)  BP: 105/49 (20 Feb 2018 15:10) (90/42 - 135/63)  BP(mean): 72 (20 Feb 2018 15:10) (61 - 116)  ABP: --  ABP(mean): --  RR: 23 (20 Feb 2018 15:10) (12 - 26)  SpO2: 98% (20 Feb 2018 15:10) (91% - 100%)    PHYSICAL EXAM:  Constitutional: NAD, lying in bed, elderly, frail, somnolent  HEENT: no eye discharge, no nasal discharge, no pharyngeal erythema, dry membranes   Neck: no lymphadenopathy, no JVD,  no carotid bruit  Cardiovascular: S1 and S2, irregular irregular,  2/6 KOFI loudest over right 2nd intercostal, R/G, non-displaced PMI  Respiratory: no wheezing, no rhonchi, no cough, decreased breath sounds over right RLL, no crackles   Gastrointestinal: BS+, soft, non-distended, non-tender, no ascites  : stockton catheter draining yellow urine   Extremities: warm to touch, no edema  Vascular: 2+ peripheral pulses  Neurological: somnolent and non following commands but opens eyes, sluggish responsive pupils, unable to assess strength and sensation testing  Musculoskeletal: no joint swelling  Skin: No rashes, no skin breakdown    MEDICATIONS  (STANDING):  ALBUTerol/ipratropium for Nebulization 3 milliLiter(s) Nebulizer every 4 hours  artificial  tears Solution 1 Drop(s) Both EYES three times a day  aspirin Suppository 300 milliGRAM(s) Rectal daily  atorvastatin 40 milliGRAM(s) Oral at bedtime  heparin  Injectable 5000 Unit(s) SubCutaneous every 8 hours  metoprolol      tartrate Suspension 6.25 milliGRAM(s) Oral every 12 hours  piperacillin/tazobactam IVPB. 3.375 Gram(s) IV Intermittent every 6 hours  sodium chloride 0.9%. 250 milliLiter(s) (125 mL/Hr) IV Continuous <Continuous>    MEDICATIONS  (PRN):      Allergies    aminocaproic acid (Unknown)    Intolerances        LABS:                        13.3   11.0  )-----------( 230      ( 20 Feb 2018 06:40 )             44.1     02-20    148<H>  |  105  |  36<H>  ----------------------------<  71  3.9   |  34<H>  |  1.02    Ca    8.9      20 Feb 2018 06:40  Phos  4.4     02-19  Mg     2.7     02-20            RADIOLOGY & ADDITIONAL TESTS: Reviewed CCU to 7U Resident Transfer Note   89F PMH of CAD (s/p 3-vessel CABG), MV repair, HFpEF, HTN, HLD, hypothyroidism, and osteoporosis who presented from nursing/rehab center where she was found to be altered, tachypneic and in respiratory distress. She was placed on BIPAP. Vitals -126, /82, RR 17-27, SpO2 94% on BIPAP. CT head was negative for any acute pathology. EKG showed Aflutter with variable block, LAD, LVH with repolarization abnormality Labs were remarkable for Trop 0.02, pro-BNP 4552. VBG showed respiratory acidosis pH 7.23, pCO2 80, HCO3 33.  Bedside echo performed by cardio fellow showed EF 35-40%, LVH and hypokinetic RV. Chest X ray showed congestion and/or infiltrates with bilateral effusions. CTA PE protocol was ordered given hypoxia and signs of right heart failure. CTA was negative for PE, showed large bilateral pleural effusions and atelectasis of the bilateral lower lobes with patchy airspace opacities in the right upper lobe most likely representing pneumonia(started on Vanc+Zosyn). Pt received 40 mg IV lasix and was transfered to CCU for further management. Upon arrival in the unit, the patient remained on BIPAP and altered, minimally responsive. Formal Echo showed EF 50-55%, Pulm HTN 70mmhg, grossly dilated.  Patient diuresed adequately but with persistent Afib RVR which responded to lopressor.  Transferred to [facility] for further management.     INTERVAL HPI/OVERNIGHT EVENTS:  Episodes of Afib RVR which responded to lopressor    Vital Signs Last 24 Hrs  T(C): 36.4 (20 Feb 2018 12:22), Max: 37.1 (19 Feb 2018 16:46)  T(F): 97.6 (20 Feb 2018 12:22), Max: 98.8 (19 Feb 2018 16:46)  HR: 84 (20 Feb 2018 15:10) (84 - 122)  BP: 105/49 (20 Feb 2018 15:10) (90/42 - 135/63)  BP(mean): 72 (20 Feb 2018 15:10) (61 - 116)  ABP: --  ABP(mean): --  RR: 23 (20 Feb 2018 15:10) (12 - 26)  SpO2: 98% (20 Feb 2018 15:10) (91% - 100%)    PHYSICAL EXAM:  Constitutional: NAD, lying in bed, elderly, frail, somnolent  HEENT: no eye discharge, no nasal discharge, no pharyngeal erythema, dry membranes   Neck: no lymphadenopathy, no JVD,  no carotid bruit  Cardiovascular: S1 and S2, irregular irregular,  2/6 KOFI loudest over right 2nd intercostal, R/G, non-displaced PMI  Respiratory: no wheezing, no rhonchi, no cough, decreased breath sounds over right RLL, no crackles   Gastrointestinal: BS+, soft, non-distended, non-tender, no ascites  : stockton catheter draining yellow urine   Extremities: warm to touch, no edema  Vascular: 2+ peripheral pulses  Neurological: somnolent and non following commands but opens eyes, sluggish responsive pupils, unable to assess strength and sensation testing  Musculoskeletal: no joint swelling  Skin: No rashes, no skin breakdown    MEDICATIONS  (STANDING):  ALBUTerol/ipratropium for Nebulization 3 milliLiter(s) Nebulizer every 4 hours  artificial  tears Solution 1 Drop(s) Both EYES three times a day  aspirin Suppository 300 milliGRAM(s) Rectal daily  atorvastatin 40 milliGRAM(s) Oral at bedtime  heparin  Injectable 5000 Unit(s) SubCutaneous every 8 hours  metoprolol      tartrate Suspension 6.25 milliGRAM(s) Oral every 12 hours  piperacillin/tazobactam IVPB. 3.375 Gram(s) IV Intermittent every 6 hours  sodium chloride 0.9%. 250 milliLiter(s) (125 mL/Hr) IV Continuous <Continuous>    MEDICATIONS  (PRN):      Allergies    aminocaproic acid (Unknown)    Intolerances        LABS:                        13.3   11.0  )-----------( 230      ( 20 Feb 2018 06:40 )             44.1     02-20    148<H>  |  105  |  36<H>  ----------------------------<  71  3.9   |  34<H>  |  1.02    Ca    8.9      20 Feb 2018 06:40  Phos  4.4     02-19  Mg     2.7     02-20            RADIOLOGY & ADDITIONAL TESTS: Reviewed

## 2018-02-20 NOTE — CONSULT NOTE ADULT - SUBJECTIVE AND OBJECTIVE BOX
SONJA JJ          MRN-[id number]            (mm/dd/yyyy)    HPI:  89 yo F with PMH CHF, HTN, HLD, hypothyroidism, osteoporosis PSHx triple bypass and mitral valve repair, presented from nursing/rehab center where she was found to be altered, tachypneic and in respiratory distress. In ED she was noted to be altered, tachypneic and tachycardic so she was placed on BIPAP. Vitals -126, /82, RR 17-27, SpO2 94% on BIPAP. CT head was negative for any acute pathology. EKG showed Aflutter with variable block, LAD, LVH with repolarization abnormality Labs were remarkable for Trop 0.02, pro-BNP 4552. VBG showed respiratory acidosis pH 7.23, pCO2 80, HCO3 33.  Bedside echo performed by cardio fellow showed EF 35-40%, LVH and hypokinetic RV. Chest X ray showed congestion and/or infiltrates with bilateral effusions. CTA PE protocol was ordered given hypoxia and signs of right heart failure. CTA was negative for PE, showed large bilateral pleural effusions and atelectasis of the bilateral lower lobes with patchy airspace opacities in the right upper lobe most likely representing pneumonia. Pt received 40 mg IV lasix and was transfered to CCU for further management. Upon arrival in the unit, the patient remained on BIPAP and altered, minimally responsive. (2018 16:13) Admitted to CCU for acute on chronic heart failure, rate control Afib/Aflutter with variable block, respiratory failure 2/2 CHF + HAP. Consult for Palliative Care for symptom management and discussion of Goals of Care in the setting of acute on chronic heart failure decompensation with respiratory failure. On IV Abx for PNA and getting aggressive diuresis for management of acute HF. Per primary team showing improvement of HF decompensation, although MS is still fluctuating.     Patient seen and examined at bedside, not opening eyes but able to answer simple yes/no questions. No signs of distress on evaluation. Per primary team, plan is for patient to be transferred to telemetry monitoring, patient is already DNR/DNI with BIPAP being used intermittently.     PAST MEDICAL & SURGICAL HISTORY:  Hyperlipidemia  Hypothyroid  Hypertension  Atherosclerosis  S/P CABG x 3    FAMILY HISTORY:  No significant family history    SOCIAL HISTORY: Patient transferred from [facility]/NH, admitted on . HCP - main decision maker is son Germán Jj.     ROS:    Unable to attain due to: patient's mental status, answered a couple of yes/no questions.                     Dyspnea (Elida 0-10): No - 0/10                       N/V (Y/N): No                             Secretions (Y/N) : No audible secretions.                Agitation(Y/N): No agitation.  Pain (Y/N): Denies any pain, patient does not look in any discomfort. No grimacing, diaphoresis or any other signs of visible distress.    General:  Denied  HEENT:    Denied  Neck:  Denied  CVS:  Denied  Resp:  Denied  GI:  Denied  :  Denied  Musc:  Denied  Neuro:  Denied  Psych:  Denied  Skin:  Denied  Lymph:  Denied    Allergies    aminocaproic acid (Unknown)    Intolerances    Opiate Naive (Y/N): Yes  -iStop reviewed (Y/N): Yes (Ref#: [id number])    Medications:      MEDICATIONS  (STANDING):  ALBUTerol/ipratropium for Nebulization 3 milliLiter(s) Nebulizer every 4 hours  artificial  tears Solution 1 Drop(s) Both EYES three times a day  aspirin Suppository 300 milliGRAM(s) Rectal daily  atorvastatin 40 milliGRAM(s) Oral at bedtime  heparin  Injectable 5000 Unit(s) SubCutaneous every 8 hours  metoprolol      tartrate Suspension 6.25 milliGRAM(s) Oral every 12 hours  piperacillin/tazobactam IVPB. 3.375 Gram(s) IV Intermittent every 6 hours  sodium chloride 0.9%. 250 milliLiter(s) (125 mL/Hr) IV Continuous <Continuous>    MEDICATIONS  (PRN): None    Labs:    CBC:                        13.3   11.0  )-----------( 230      ( 2018 06:40 )             44.1     CMP:        148<H>  |  105  |  36<H>  ----------------------------<  71  3.9   |  34<H>  |  1.02    Ca    8.9      2018 06:40  Phos  4.4     02-19  Mg     2.7     02-20    Imaging: Reviewed, no imaging for today.     < from: Xray Chest 1 View AP/PA (18 @ 11:16) >    Portable examination the chest demonstrates patient status post   sternotomy and valvular replacement. Cardiomegaly. Congestion and/or   infiltrates. Bilateral effusions.    PEx:  T(C): 36.4 (18 @ 12:22), Max: 37.1 (18 @ 16:46)  HR: 110 (18 @ 11:00) (84 - 122)  BP: 113/56 (18 @ 11:00) (76/43 - 135/63)  RR: 19 (18 @ 11:00) (12 - 26)  SpO2: 80% (18 @ 11:00) (80% - 100%)  Wt(kg): --  Daily     Daily Weight in k.4 (2018 05:51)  CAPILLARY BLOOD GLUCOSE    POCT Blood Glucose.: 91 mg/dL (2018 20:20)    I&O's Summary    2018 07:  -  2018 07:00  --------------------------------------------------------  IN: 650 mL / OUT: 810 mL / NET: -160 mL    2018 07:01  -  2018 12:34  --------------------------------------------------------  IN: 500 mL / OUT: 120 mL / NET: 380 mL    General: Frail elderly female.  HEENT: Dry mucous membranes  Neck: Supple, no LAD  CVS: Tachycardic, irregular rhythm. +LE edema.  Resp: Decreased breath sounds at base.  GI: +BS, no tenderness on palpation.    : Prince draining clear urine.   Musc: +weakness.  Neuro: Lethargic but arousable with verbal stimulation.   Psych: No agitation, patient appears calm.    Skin: Pale.  Lymph: No LAD  Preadmit Karnofsky:  %           Current Karnofsky: 30%  Cachexia (Y/N): No  BMI: 20    Advanced Directives:     DNR/DNI     MOLST filled out on  --> DNR/DNI, no feeding tube, trial of Abx.       Decision maker and legal surrogate: Son - Germán Jj.    GOALS OF CARE DISCUSSION       Palliative care info/counseling provided	           Documentation of GOC: Patient is DNR/DNI. Son is main decision maker. 	          REFERRALS	        Palliative Med        Unit SW/Case Mgmt       Patient/Family Support       Massage Therapy       Music Therapy SONJA JJ          MRN-[id number]            (mm/dd/yyyy)    HPI:  89 yo F with PMH CHF, HTN, HLD, hypothyroidism, osteoporosis PSHx triple bypass and mitral valve repair, presented from [facility]/rehab center where she was found to be altered, tachypneic and in respiratory distress. In ED she was noted to be altered, tachypneic and tachycardic so she was placed on BIPAP. Vitals -126, /82, RR 17-27, SpO2 94% on BIPAP. CT head was negative for any acute pathology. EKG showed Aflutter with variable block, LAD, LVH with repolarization abnormality Labs were remarkable for Trop 0.02, pro-BNP 4552. VBG showed respiratory acidosis pH 7.23, pCO2 80, HCO3 33.  Bedside echo performed by cardio fellow showed EF 35-40%, LVH and hypokinetic RV. Chest X ray showed congestion and/or infiltrates with bilateral effusions. CTA PE protocol was ordered given hypoxia and signs of right heart failure. CTA was negative for PE, showed large bilateral pleural effusions and atelectasis of the bilateral lower lobes with patchy airspace opacities in the right upper lobe most likely representing pneumonia. Pt received 40 mg IV lasix and was transfered to CCU for further management. Upon arrival in the unit, the patient remained on BIPAP and altered, minimally responsive. (2018 16:13) Admitted to CCU for acute on chronic heart failure, rate control Afib/Aflutter with variable block, respiratory failure 2/2 CHF + HAP. Consult for Palliative Care for symptom management and discussion of Goals of Care in the setting of acute on chronic heart failure decompensation with respiratory failure. On IV Abx for PNA and getting aggressive diuresis for management of acute HF. Per primary team showing improvement of HF decompensation, although MS is still fluctuating.     Patient seen and examined at bedside, not opening eyes but able to answer simple yes/no questions. No signs of distress on evaluation. Per primary team, plan is for patient to be transferred to telemetry monitoring, patient is already DNR/DNI with BIPAP being used intermittently.     PAST MEDICAL & SURGICAL HISTORY:  Hyperlipidemia  Hypothyroid  Hypertension  Atherosclerosis  S/P CABG x 3    FAMILY HISTORY:  No significant family history    SOCIAL HISTORY: Patient transferred from [facility]/NH, admitted on , per son patient had been sent there for rehab after a fall from [facility]. Patient has been unable to return to her apartment as patient had been living with another person in the apartment as the patient and son felt the relationship to be disruptive. Patient is unmarried, has only one son Germán Jj (patient's HCP), no other family/relatives. Prior to being admitted, son states that patient had been walking with assistance (walker/cane), able to eat by herself, responsible for main ADLs. Her decline has been pretty significant and it happened within 1 wk.     ROS:    Unable to attain due to: patient's mental status, answered a couple of yes/no questions.                     Dyspnea (Elida 0-10): No - 0/10                       N/V (Y/N): No                             Secretions (Y/N) : No audible secretions.                Agitation(Y/N): No agitation.  Pain (Y/N): Denies any pain, patient does not look in any discomfort. No grimacing, diaphoresis or any other signs of visible distress.    Allergies    aminocaproic acid (Unknown)    Intolerances    Opiate Naive (Y/N): Yes  -iStop reviewed (Y/N): Yes (Ref#: [id number])    Medications:      MEDICATIONS  (STANDING):  ALBUTerol/ipratropium for Nebulization 3 milliLiter(s) Nebulizer every 4 hours  artificial  tears Solution 1 Drop(s) Both EYES three times a day  aspirin Suppository 300 milliGRAM(s) Rectal daily  atorvastatin 40 milliGRAM(s) Oral at bedtime  heparin  Injectable 5000 Unit(s) SubCutaneous every 8 hours  metoprolol      tartrate Suspension 6.25 milliGRAM(s) Oral every 12 hours  piperacillin/tazobactam IVPB. 3.375 Gram(s) IV Intermittent every 6 hours  sodium chloride 0.9%. 250 milliLiter(s) (125 mL/Hr) IV Continuous <Continuous>    MEDICATIONS  (PRN): None    Labs:    CBC:                        13.3   11.0  )-----------( 230      ( 2018 06:40 )             44.1     CMP:        148<H>  |  105  |  36<H>  ----------------------------<  71  3.9   |  34<H>  |  1.02    Ca    8.9      2018 06:40  Phos  4.4       Mg     2.7         Imaging: Reviewed, no imaging for today.     < from: Xray Chest 1 View AP/PA (18 @ 11:16) >    Portable examination the chest demonstrates patient status post   sternotomy and valvular replacement. Cardiomegaly. Congestion and/or   infiltrates. Bilateral effusions.    PEx:  T(C): 36.4 (18 @ 12:22), Max: 37.1 (18 @ 16:46)  HR: 110 (18 @ 11:00) (84 - 122)  BP: 113/56 (18 @ 11:00) (76/43 - 135/63)  RR: 19 (18 @ 11:00) (12 - 26)  SpO2: 80% (18 @ 11:00) (80% - 100%)  Wt(kg): --  Daily     Daily Weight in k.4 (2018 05:51)  CAPILLARY BLOOD GLUCOSE    POCT Blood Glucose.: 91 mg/dL (2018 20:20)    I&O's Summary    2018 07:  -  2018 07:00  --------------------------------------------------------  IN: 650 mL / OUT: 810 mL / NET: -160 mL    2018 07:  -  2018 12:34  --------------------------------------------------------  IN: 500 mL / OUT: 120 mL / NET: 380 mL    General: Frail elderly female.  HEENT: Dry mucous membranes  Neck: Supple, no LAD  CVS: Tachycardic, irregular rhythm. +LE edema.  Resp: Decreased breath sounds at base.  GI: +BS, no tenderness on palpation.    : Prince draining clear urine.   Musc: +weakness.  Neuro: Lethargic but arousable with verbal stimulation.   Psych: No agitation, patient appears calm.    Skin: Pale.  Lymph: No LAD  Preadmit Karnofsky:  %           Current Karnofsky: 30%  Cachexia (Y/N): No  BMI: 20    Advanced Directives:     DNR/DNI     MOLST filled out on  --> DNR/DNI, no feeding tube, trial of Abx.       Decision maker and legal surrogate: Son - Germná Jj.    GOALS OF CARE DISCUSSION       Palliative care info/counseling provided	           Documentation of GOC: Patient is DNR/DNI. Son is main decision maker. 	          REFERRALS	        Palliative Med        Unit SW/Case Mgmt       Patient/Family Support       Massage Therapy       Music Therapy SONJA JJ          MRN-[id number]            (1921)    HPI:  89 yo F with PMH CHF, HTN, HLD, hypothyroidism, osteoporosis PSHx triple bypass and mitral valve repair, presented from nursing/rehab center where she was found to be altered, tachypneic and in respiratory distress. In ED she was noted to be altered, tachypneic and tachycardic so she was placed on BIPAP. Vitals -126, /82, RR 17-27, SpO2 94% on BIPAP. CT head was negative for any acute pathology. EKG showed Aflutter with variable block, LAD, LVH with repolarization abnormality Labs were remarkable for Trop 0.02, pro-BNP 4552. VBG showed respiratory acidosis pH 7.23, pCO2 80, HCO3 33.  Bedside echo performed by cardio fellow showed EF 35-40%, LVH and hypokinetic RV. Chest X ray showed congestion and/or infiltrates with bilateral effusions. CTA PE protocol was ordered given hypoxia and signs of right heart failure. CTA was negative for PE, showed large bilateral pleural effusions and atelectasis of the bilateral lower lobes with patchy airspace opacities in the right upper lobe most likely representing pneumonia. Pt received 40 mg IV lasix and was transfered to CCU for further management. Upon arrival in the unit, the patient remained on BIPAP and altered, minimally responsive. (2018 16:13) Admitted to CCU for acute on chronic heart failure, rate control Afib/Aflutter with variable block, respiratory failure 2/2 CHF + HAP. Consult for Palliative Care for symptom management and discussion of Goals of Care in the setting of acute on chronic heart failure decompensation with respiratory failure. On IV Abx for PNA and getting aggressive diuresis for management of acute HF. Per primary team showing improvement of HF decompensation, although MS is still fluctuating.     Patient seen and examined at bedside, not opening eyes but able to answer simple yes/no questions. No signs of distress on evaluation. Per primary team, plan is for patient to be transferred to telemetry monitoring, patient is already DNR/DNI with BIPAP being used intermittently.     PAST MEDICAL & SURGICAL HISTORY:  Hyperlipidemia  Hypothyroid  Hypertension  Atherosclerosis  S/P CABG x 3    FAMILY HISTORY:  No significant family history    SOCIAL HISTORY: Patient transferred from [facility]/NH, admitted on , per son patient had been sent there for rehab after a fall, transferred  from [facility]. Patient has been unable to return to her apartment as patient had been living with another person in the apartment as the patient and son felt the relationship to be disruptive. Patient is unmarried, has only one son Germán jJ (patient's HCP), no other family/relatives. Prior to being admitted, son states that patient had been walking with assistance (walker/cane), able to eat by herself, responsible for main ADLs. Her decline has been pretty significant and it happened within 1 wk.     ROS:    Unable to attain due to: patient's mental status, answered a couple of yes/no questions.                     Dyspnea (Elida 0-10): No - 0/10                       N/V (Y/N): No                             Secretions (Y/N) : No audible secretions.                Agitation(Y/N): No agitation.  Pain (Y/N): Denies any pain, patient does not look in any discomfort. No grimacing, diaphoresis or any other signs of visible distress.  Remainder or ROS unobtainable from patient due to altered mental staus    Allergies    aminocaproic acid (Unknown)    Intolerances    Opiate Naive (Y/N): Yes  -iStop reviewed (Y/N): Yes (Ref#: [id number])    Medications:      MEDICATIONS  (STANDING):  ALBUTerol/ipratropium for Nebulization 3 milliLiter(s) Nebulizer every 4 hours  artificial  tears Solution 1 Drop(s) Both EYES three times a day  aspirin Suppository 300 milliGRAM(s) Rectal daily  atorvastatin 40 milliGRAM(s) Oral at bedtime  heparin  Injectable 5000 Unit(s) SubCutaneous every 8 hours  metoprolol      tartrate Suspension 6.25 milliGRAM(s) Oral every 12 hours  piperacillin/tazobactam IVPB. 3.375 Gram(s) IV Intermittent every 6 hours  sodium chloride 0.9%. 250 milliLiter(s) (125 mL/Hr) IV Continuous <Continuous>    MEDICATIONS  (PRN): None    Labs:    CBC:                        13.3   11.0  )-----------( 230      ( 2018 06:40 )             44.1     CMP:    02    148<H>  |  105  |  36<H>  ----------------------------<  71  3.9   |  34<H>  |  1.02    Ca    8.9      2018 06:40  Phos  4.4     -  Mg     2.7         Imaging: Reviewed, no imaging for today.     < from: Xray Chest 1 View AP/PA (18 @ 11:16) >    Portable examination the chest demonstrates patient status post   sternotomy and valvular replacement. Cardiomegaly. Congestion and/or   infiltrates. Bilateral effusions.    PEx:  T(C): 36.4 (18 @ 12:22), Max: 37.1 (18 @ 16:46)  HR: 110 (18 @ 11:00) (84 - 122)  BP: 113/56 (18 @ 11:00) (76/43 - 135/63)  RR: 19 (18 @ 11:00) (12 - 26)  SpO2: 80% (18 @ 11:00) (80% - 100%)  Wt(kg): --  Daily     Daily Weight in k.4 (2018 05:51)  CAPILLARY BLOOD GLUCOSE    POCT Blood Glucose.: 91 mg/dL (2018 20:20)    I&O's Summary    2018 07:  -  2018 07:00  --------------------------------------------------------  IN: 650 mL / OUT: 810 mL / NET: -160 mL    2018 07:  -  2018 12:34  --------------------------------------------------------  IN: 500 mL / OUT: 120 mL / NET: 380 mL    General: Frail elderly female.  HEENT: Dry mucous membranes, bandages across brige of nose, abrasion (?from BiPap mask) above right eyebrow  Neck: Supple, no LAD  CVS: Tachycardic, irregular rhythm. +LE edema.  Resp: Decreased breath sounds at base.  GI: +BS, no tenderness on palpation.    : Prince draining clear urine.   Musc: +weakness.  Neuro: Lethargic but arousable with verbal stimulation.   Psych: No agitation, patient appears calm.    Skin: Pale.  Lymph: No LAD  Preadmit Karnofsky:   unclear but >50% %           Current Karnofsky: 30%  Cachexia (Y/N): No  BMI: 20    Advanced Directives:     DNR/DNI     MOLST filled out on  --> DNR/DNI, no feeding tube, trial of Abx.       Decision maker and legal surrogate: Son - Germán Jj.    GOALS OF CARE DISCUSSION       Palliative care info/counseling provided	           Documentation of GOC: Patient is DNR/DNI. Son is main decision maker. Goals not fully delineated- a time limited trial considered to see how much better patient can get	          REFERRALS	        Palliative Med        Unit SW/Case Mgmt       Patient/Family Support       Massage Therapy       Music Therapy

## 2018-02-20 NOTE — CONSULT NOTE ADULT - PROBLEM SELECTOR RECOMMENDATION 9
Likely multifactorial, PNA + Heart Failure and pulmonary edema. Bilateral pleural effusions seen on CXR, and per records from NH also seen on CXR on 2/15.  Currently on oxygen, saturating ~85-90%. Requiring BIPAP occasionally. Does not show any signs of distress/tachypnea, patient denies any SOB. S/p 48hrs of diuresis and IV abx.  Likely premature to see if there is any further improvement in patients respiratory status. Already DNI. Likely multifactorial, PNA + Heart Failure and pulmonary edema. Bilateral pleural effusions seen on CXR, and per records from NH also seen on CXR on 2/15.  Currently on oxygen, saturating ~85-90%. Requiring BIPAP occasionally. Does not show any signs of distress/tachypnea, patient denies any SOB. S/p 48hrs of diuresis and IV abx.  Likely premature to see if there is any further improvement in patients MS and respiratory status. Already DNI.

## 2018-02-20 NOTE — PROGRESS NOTE ADULT - PROBLEM SELECTOR PLAN 1
Echo demonstrating EF 50-55%, severely reduced LV filling.  CTA PE protocol showed cardiomegaly and post MVR, right cardiac chamber  enlargement suggesting right-sided congestive heart failure.  pro-BNP 4552.  -- s/p 40 mg Lasix IV with adequate reponse - diuresis holiday yesterday, no longer fluid-overloaded   -- strick I/Os  -- Daily weight  -- Re-started metoprolol 6.25 BID today, titrate up to 12.5 mg PO BID when able to tolerate PO

## 2018-02-20 NOTE — PROGRESS NOTE ADULT - PROBLEM SELECTOR PLAN 8
F: no IVF  E: K>4 Mg>2, monitor and replete as needed  N: NPO  Ppx: SQH  D: RMF  DNR/DNI: per discussion with pt son, no invasive measures like A-lines or central lines or procedures, but ok with IV abx & diuresis - see chart note for full details Pt with Hx of hypothyroidism per documentation from rehab/nursing facility but no synthroid listed   -f/u TFT although patient is acutely ill

## 2018-02-20 NOTE — PROGRESS NOTE ADULT - ASSESSMENT
Ms. Sosa is a 89 year-old F with a PMH of CAD (s/p 3-vessel CABG), MV repair, sCHF, HTN, HLD, hypothyroidism, and osteoporosis who presented from nursing/rehab center where she was found to be altered, tachypneic and in respiratory distress, admitted to the CCU for CHF exacerbation and hypoxic hypercapnic respiratory failure.

## 2018-02-20 NOTE — PROGRESS NOTE ADULT - PROBLEM SELECTOR PLAN 3
Pt meeting sepsis criteria on admission (tachypnea, tachycardia and suspected PNA on CT chest).   -CT chest with patchy airspace opacities in the right upper lobe representing pneumonia.  RVP negative.  -c/w Vanc+Zosyn  -vanc trough 2/21 at noon Chronic. however risk of anticoagulation outweighs benefits given pt's age and functional status  -[id number] 5, family did not want anticoagulation  -Lopressor for rate control

## 2018-02-20 NOTE — PROGRESS NOTE ADULT - PROBLEM SELECTOR PLAN 5
Pt currently normotensive  -Holding home metoprolol 12.5 BID, Lisinopril 2.5 mg in setting of sepsis w/ history of 3 vessel CABG  -no evidence of ACS at this time  -holding BB, ACEI  -c/w 81mg ASA

## 2018-02-20 NOTE — PROGRESS NOTE ADULT - PROBLEM SELECTOR PLAN 2
Likely 2/2 CHF exacerbation and HAP. CT chest with large bilateral pleural effusions, atelectasis. Chest Xray improved.  -- Resolved  -- BiPAP overnight  -- f/u ABG

## 2018-02-20 NOTE — PROGRESS NOTE ADULT - ASSESSMENT
Ms. Sosa is a 89 year-old F with a PMH of CAD (s/p 3-vessel CABG), MV repair, sCHF, HTN, HLD, hypothyroidism, and osteoporosis who presented from nursing/rehab center where she was found to be altered, tachypneic and in respiratory distress, admitted to the CCU for CHF exacerbation and hypoxic hypercapnic respiratory failure. 89F with a PMH of CAD (s/p 3-vessel CABG), MV repair, sCHF, HTN, HLD, hypothyroidism, and osteoporosis who presented from nursing/rehab center where she was found to be altered, tachypneic and in respiratory distress, admitted to the CCU for CHF exacerbation and hypoxic hypercapnic respiratory failure c/b HCAP and Afib RVR

## 2018-02-20 NOTE — PROGRESS NOTE ADULT - PROBLEM SELECTOR PLAN 1
CTA PE protocol showed cardiomegaly and post MVR, right cardiac chamber  enlargement suggesting right-sided congestive heart failure.    -Echo demonstrating EF 50-55%, gross dilation  -Diuresed with PRN IV Lasix, will continue as needed   -Monitor I/O   -Holding BB, ACE-I/ARB in the setting of sepsis    #Pulmonary Hypertension  Likely class 2, no reported history of primary pulmonary disease   -70mmHg  -maintain euvolemic

## 2018-02-20 NOTE — CONSULT NOTE ADULT - PROBLEM SELECTOR RECOMMENDATION 3
Cardiac disease has been ongoing for patient, given patient's age and significant cardiac history patient is very high risk for recurrent re-hospitalizations, pending further discussions with son. At this point patient would qualify for hospice if family interested. Cardiac disease has been ongoing for patient, given patient's age and significant cardiac history patient is very high risk for recurrent re-hospitalizations, per son patient had been stable in regards to her heart. If patient's MS and functional status do not improve despite medical therapy, patient would qualify for hospice if family interested. Pending further GOC discussions. After discussing with son, he would like for patient to go back to rehab facility, he understands that if there is no improvement of patient's MS rehab would not be an option.

## 2018-02-20 NOTE — DIETITIAN INITIAL EVALUATION ADULT. - PROBLEM SELECTOR PLAN 4
[id number], however risk of anticoagulation outways benefits given pt's age and functional status  -Currently rate controlled on tele  -Holding on AC   -Daily EKG

## 2018-02-20 NOTE — CHART NOTE - NSCHARTNOTEFT_GEN_A_CORE
89 year-old F with a PMH of CAD (s/p 3-vessel CABG), MV repair, sCHF, HTN, HLD, hypothyroidism, and osteoporosis who presented from nursing/rehab center where she was found to be altered, tachypneic and in respiratory distress, admitted to the CCU for CHF exacerbation and hypoxic hypercapnic respiratory failure. In [facility] ED she was placed on BIPAP. Vitals -126, /82, RR 17-27, SpO2 94% on BIPAP. CT head was negative for any acute pathology. EKG showed Aflutter with variable block, LAD, LVH with repolarization abnormality Labs were remarkable for Trop 0.02, pro-BNP 4552. VBG showed respiratory acidosis pH 7.23, pCO2 80, HCO3 33.  Bedside echo performed by cardio fellow showed EF 35-40%, LVH and hypokinetic RV. Chest X ray showed congestion and/or infiltrates with bilateral effusions. CTA PE protocol was ordered given hypoxia and signs of right heart failure. CTA was negative for PE, showed large bilateral pleural effusions and atelectasis of the bilateral lower lobes with patchy airspace opacities in the right upper lobe most likely representing pneumonia.     After coming to CCU, she was diuresed on an as-needed basis to a goal of 2 to 3 L net negative per day, which was achieved.  She remained on BiPAP for over 24 hours.  She was also started on antibiotics for was was thought to be hospital acquired PNA.  She will remain on vanc/zosyn for a five day course, finishing on 2/22.  Her next vanc trough is before the dose on the 21st.  Her mental status, which remained poor after admission, slowly started to improve.  She was weaned from BiPAP on the 19th and was able to tolerate 2 L NC with no difficulty.  She no longer needed additional diuresis.  She passed her bedside dypshagia.  She was given lopressor overnight into the 20th because of increasing HR into 120s.  Since she was no longer septic, she was re-started on metoprolol at half her home dose (via elixer) with the goal of increasing to 12.5 BID by mouth.  She was seen by palliative care, who plan to reach out to the patient's son for a conversation regarding goals of care.  Her mental status continued to improve, and she was deemed stable for step down to regional medical floors. 89F PMH of CAD (s/p 3-vessel CABG), MV repair, HFpEF, HTN, HLD, hypothyroidism, and osteoporosis who presented from nursing/rehab center where she was found to be altered, tachypneic and in respiratory distress. She was placed on BIPAP. Vitals -126, /82, RR 17-27, SpO2 94% on BIPAP. CT head was negative for any acute pathology. EKG showed Aflutter with variable block, LAD, LVH with repolarization abnormality Labs were remarkable for Trop 0.02, pro-BNP 4552. VBG showed respiratory acidosis pH 7.23, pCO2 80, HCO3 33.  Bedside echo performed by cardio fellow showed EF 35-40%, LVH and hypokinetic RV. Chest X ray showed congestion and/or infiltrates with bilateral effusions. CTA PE protocol was ordered given hypoxia and signs of right heart failure. CTA was negative for PE, showed large bilateral pleural effusions and atelectasis of the bilateral lower lobes with patchy airspace opacities in the right upper lobe most likely representing pneumonia(started on Vanc+Zosyn). Pt received 40 mg IV lasix and was transfered to CCU for further management. Upon arrival in the unit, the patient remained on BIPAP and altered, minimally responsive. Formal Echo showed EF 50-55%, Pulm HTN 70mmhg, grossly dilated.  Patient diuresed adequately but with persistent Afib RVR which responded to lopressor.  Transferred to [facility] for further management. Of note, patient is DNR/DNI with no escalation of care(also no central, arterial lines, pressors).

## 2018-02-20 NOTE — PROGRESS NOTE ADULT - PROBLEM SELECTOR PLAN 4
Chronic. however risk of anticoagulation outweighs benefits given pt's age and functional status  -[id number] 5, family did not want anticoagulation  -Lopressor for rate control Pt meeting sepsis criteria on admission (tachypnea, tachycardia and suspected PNA on CT chest).   -CT chest with patchy airspace opacities in the right upper lobe representing pneumonia.  RVP negative.  -c/w Vanc+Zosyn  -vanc trough 2/21 at noon

## 2018-02-20 NOTE — DIETITIAN INITIAL EVALUATION ADULT. - PROBLEM SELECTOR PLAN 3
Pt meeting sepsis criteria on admission (tachypnea, tachycardia and suspected PNA on CT chest). CT chest with patchy airspace opacities in the right upper lobe most likely representing pneumonia.  --Will cover for HAP with Vanc 750 mg Q24hr and zosyn 3.375 g IV Q6  -Next Vanc trough 2/21 at noon  -Will check RVP  -UA negative

## 2018-02-21 DIAGNOSIS — E87.0 HYPEROSMOLALITY AND HYPERNATREMIA: ICD-10-CM

## 2018-02-21 LAB
ANION GAP SERPL CALC-SCNC: 10 MMOL/L — SIGNIFICANT CHANGE UP (ref 5–17)
ANION GAP SERPL CALC-SCNC: 9 MMOL/L — SIGNIFICANT CHANGE UP (ref 5–17)
BUN SERPL-MCNC: 25 MG/DL — HIGH (ref 7–23)
BUN SERPL-MCNC: 30 MG/DL — HIGH (ref 7–23)
CALCIUM SERPL-MCNC: 8 MG/DL — LOW (ref 8.4–10.5)
CALCIUM SERPL-MCNC: 8.7 MG/DL — SIGNIFICANT CHANGE UP (ref 8.4–10.5)
CHLORIDE SERPL-SCNC: 106 MMOL/L — SIGNIFICANT CHANGE UP (ref 96–108)
CHLORIDE SERPL-SCNC: 109 MMOL/L — HIGH (ref 96–108)
CO2 SERPL-SCNC: 32 MMOL/L — HIGH (ref 22–31)
CO2 SERPL-SCNC: 32 MMOL/L — HIGH (ref 22–31)
CREAT SERPL-MCNC: 0.76 MG/DL — SIGNIFICANT CHANGE UP (ref 0.5–1.3)
CREAT SERPL-MCNC: 0.81 MG/DL — SIGNIFICANT CHANGE UP (ref 0.5–1.3)
GLUCOSE SERPL-MCNC: 141 MG/DL — HIGH (ref 70–99)
GLUCOSE SERPL-MCNC: 217 MG/DL — HIGH (ref 70–99)
HCT VFR BLD CALC: 42.8 % — SIGNIFICANT CHANGE UP (ref 34.5–45)
HGB BLD-MCNC: 13 G/DL — SIGNIFICANT CHANGE UP (ref 11.5–15.5)
MAGNESIUM SERPL-MCNC: 2.8 MG/DL — HIGH (ref 1.6–2.6)
MCHC RBC-ENTMCNC: 28.4 PG — SIGNIFICANT CHANGE UP (ref 27–34)
MCHC RBC-ENTMCNC: 30.4 G/DL — LOW (ref 32–36)
MCV RBC AUTO: 93.7 FL — SIGNIFICANT CHANGE UP (ref 80–100)
PLATELET # BLD AUTO: 208 K/UL — SIGNIFICANT CHANGE UP (ref 150–400)
POTASSIUM SERPL-MCNC: 2.8 MMOL/L — CRITICAL LOW (ref 3.5–5.3)
POTASSIUM SERPL-MCNC: 3.6 MMOL/L — SIGNIFICANT CHANGE UP (ref 3.5–5.3)
POTASSIUM SERPL-SCNC: 2.8 MMOL/L — CRITICAL LOW (ref 3.5–5.3)
POTASSIUM SERPL-SCNC: 3.6 MMOL/L — SIGNIFICANT CHANGE UP (ref 3.5–5.3)
RBC # BLD: 4.57 M/UL — SIGNIFICANT CHANGE UP (ref 3.8–5.2)
RBC # FLD: 15.7 % — SIGNIFICANT CHANGE UP (ref 10.3–16.9)
SODIUM SERPL-SCNC: 147 MMOL/L — HIGH (ref 135–145)
SODIUM SERPL-SCNC: 151 MMOL/L — HIGH (ref 135–145)
WBC # BLD: 8.9 K/UL — SIGNIFICANT CHANGE UP (ref 3.8–10.5)
WBC # FLD AUTO: 8.9 K/UL — SIGNIFICANT CHANGE UP (ref 3.8–10.5)

## 2018-02-21 PROCEDURE — 99233 SBSQ HOSP IP/OBS HIGH 50: CPT | Mod: GC

## 2018-02-21 RX ORDER — POTASSIUM CHLORIDE 20 MEQ
40 PACKET (EA) ORAL
Qty: 0 | Refills: 0 | Status: COMPLETED | OUTPATIENT
Start: 2018-02-21 | End: 2018-02-22

## 2018-02-21 RX ORDER — SODIUM CHLORIDE 9 MG/ML
1000 INJECTION, SOLUTION INTRAVENOUS
Qty: 0 | Refills: 0 | Status: DISCONTINUED | OUTPATIENT
Start: 2018-02-21 | End: 2018-02-22

## 2018-02-21 RX ORDER — POTASSIUM CHLORIDE 20 MEQ
10 PACKET (EA) ORAL
Qty: 0 | Refills: 0 | Status: COMPLETED | OUTPATIENT
Start: 2018-02-21 | End: 2018-02-21

## 2018-02-21 RX ADMIN — Medication 3 MILLILITER(S): at 17:14

## 2018-02-21 RX ADMIN — Medication 1 DROP(S): at 05:12

## 2018-02-21 RX ADMIN — Medication 1 DROP(S): at 14:08

## 2018-02-21 RX ADMIN — PIPERACILLIN AND TAZOBACTAM 200 GRAM(S): 4; .5 INJECTION, POWDER, LYOPHILIZED, FOR SOLUTION INTRAVENOUS at 00:42

## 2018-02-21 RX ADMIN — SODIUM CHLORIDE 50 MILLILITER(S): 9 INJECTION, SOLUTION INTRAVENOUS at 06:34

## 2018-02-21 RX ADMIN — Medication 40 MILLIEQUIVALENT(S): at 19:51

## 2018-02-21 RX ADMIN — Medication 3 MILLILITER(S): at 10:02

## 2018-02-21 RX ADMIN — Medication 3 MILLILITER(S): at 06:21

## 2018-02-21 RX ADMIN — Medication 6.25 MILLIGRAM(S): at 22:07

## 2018-02-21 RX ADMIN — Medication 3 MILLILITER(S): at 22:25

## 2018-02-21 RX ADMIN — SODIUM CHLORIDE 70 MILLILITER(S): 9 INJECTION, SOLUTION INTRAVENOUS at 10:02

## 2018-02-21 RX ADMIN — Medication 6.25 MILLIGRAM(S): at 11:41

## 2018-02-21 RX ADMIN — PIPERACILLIN AND TAZOBACTAM 200 GRAM(S): 4; .5 INJECTION, POWDER, LYOPHILIZED, FOR SOLUTION INTRAVENOUS at 17:15

## 2018-02-21 RX ADMIN — ATORVASTATIN CALCIUM 40 MILLIGRAM(S): 80 TABLET, FILM COATED ORAL at 22:26

## 2018-02-21 RX ADMIN — Medication 3 MILLILITER(S): at 04:11

## 2018-02-21 RX ADMIN — HEPARIN SODIUM 5000 UNIT(S): 5000 INJECTION INTRAVENOUS; SUBCUTANEOUS at 05:11

## 2018-02-21 RX ADMIN — Medication 81 MILLIGRAM(S): at 11:40

## 2018-02-21 RX ADMIN — Medication 1 DROP(S): at 22:25

## 2018-02-21 RX ADMIN — PIPERACILLIN AND TAZOBACTAM 200 GRAM(S): 4; .5 INJECTION, POWDER, LYOPHILIZED, FOR SOLUTION INTRAVENOUS at 05:11

## 2018-02-21 RX ADMIN — HEPARIN SODIUM 5000 UNIT(S): 5000 INJECTION INTRAVENOUS; SUBCUTANEOUS at 14:08

## 2018-02-21 RX ADMIN — Medication 100 MILLIEQUIVALENT(S): at 20:50

## 2018-02-21 RX ADMIN — Medication 100 MILLIEQUIVALENT(S): at 21:50

## 2018-02-21 RX ADMIN — Medication 3 MILLILITER(S): at 14:08

## 2018-02-21 RX ADMIN — Medication 100 MILLIEQUIVALENT(S): at 19:49

## 2018-02-21 RX ADMIN — Medication 40 MILLIEQUIVALENT(S): at 22:26

## 2018-02-21 RX ADMIN — PIPERACILLIN AND TAZOBACTAM 200 GRAM(S): 4; .5 INJECTION, POWDER, LYOPHILIZED, FOR SOLUTION INTRAVENOUS at 11:40

## 2018-02-21 RX ADMIN — HEPARIN SODIUM 5000 UNIT(S): 5000 INJECTION INTRAVENOUS; SUBCUTANEOUS at 22:26

## 2018-02-21 NOTE — CONSULT NOTE ADULT - SUBJECTIVE AND OBJECTIVE BOX
Patient is a 89y old  Female who presents with a chief complaint of      HPI:  89 yo F with PMH CHF, HTN, HLD, hypothyroidism, osteoporosis PSHx triple bypass and mitral valve repair, presented from nursing/rehab center where she was found to be altered, tachypneic and in respiratory distress. In ED she was noted to be altered, tachypneic and tachycardic so she was placed on BIPAP. Vitals -126, /82, RR 17-27, SpO2 94% on BIPAP. CT head was negative for any acute pathology. EKG showed Aflutter with variable block, LAD, LVH with repolarization abnormality Labs were remarkable for Trop 0.02, pro-BNP 4552. VBG showed respiratory acidosis pH 7.23, pCO2 80, HCO3 33.  Bedside echo performed by cardio fellow showed EF 35-40%, LVH and hypokinetic RV. Chest X ray showed congestion and/or infiltrates with bilateral effusions. CTA PE protocol was ordered given hypoxia and signs of right heart failure. CTA was negative for PE, showed large bilateral pleural effusions and atelectasis of the bilateral lower lobes with patchy airspace opacities in the right upper lobe most likely representing pneumonia. Pt received 40 mg IV lasix and was transfered to CCU for further management. Upon arrival in the unit, the patient remained on BIPAP and altered, minimally responsive. (18 Feb 2018 16:13)      PAST MEDICAL & SURGICAL HISTORY:  Hyperlipidemia  Hypothyroid  Hypertension  Atherosclerosis  S/P CABG x 3      MEDICATIONS  (STANDING):  ALBUTerol/ipratropium for Nebulization 3 milliLiter(s) Nebulizer every 4 hours  artificial  tears Solution 1 Drop(s) Both EYES three times a day  aspirin  chewable 81 milliGRAM(s) Oral daily  atorvastatin 40 milliGRAM(s) Oral at bedtime  dextrose 5%. 1000 milliLiter(s) (70 mL/Hr) IV Continuous <Continuous>  heparin  Injectable 5000 Unit(s) SubCutaneous every 8 hours  metoprolol      tartrate Suspension 6.25 milliGRAM(s) Oral every 12 hours  piperacillin/tazobactam IVPB. 3.375 Gram(s) IV Intermittent every 6 hours    MEDICATIONS  (PRN):      Social History: transferred from [facility], has a son Germán (HCP)    Functional Level Prior to Admission: per son, she requires partial assist with bathing/dressing, fed self, walked with a walker/ P.T.    FAMILY HISTORY:  No significant family history      CBC Full  -  ( 21 Feb 2018 07:51 )  WBC Count : 8.9 K/uL  Hemoglobin : 13.0 g/dL  Hematocrit : 42.8 %  Platelet Count - Automated : 208 K/uL  Mean Cell Volume : 93.7 fL  Mean Cell Hemoglobin : 28.4 pg  Mean Cell Hemoglobin Concentration : 30.4 g/dL  Auto Neutrophil # : x  Auto Lymphocyte # : x  Auto Monocyte # : x  Auto Eosinophil # : x  Auto Basophil # : x  Auto Neutrophil % : x  Auto Lymphocyte % : x  Auto Monocyte % : x  Auto Eosinophil % : x  Auto Basophil % : x      02-21    151<H>  |  109<H>  |  30<H>  ----------------------------<  141<H>  3.6   |  32<H>  |  0.81    Ca    8.7      21 Feb 2018 07:51  Phos  4.4     02-19  Mg     2.8     02-21              Radiology:    < from: Xray Chest 1 View AP/PA (02.18.18 @ 11:16) >  EXAM:  XR CHEST AP OR PA 1V                          PROCEDURE DATE:  02/18/2018                     INTERPRETATION:  Clinical History: Cough    Portable examination the chest demonstrates patient status post   sternotomy and valvular replacement. Cardiomegaly. Congestion and/or   infiltrates. Bilateral effusions.    Impression: Congestion and/or infiltrates. Bilateral effusions        < from: CT Head No Cont (02.18.18 @ 11:31) >  EXAM:  CT BRAIN                          PROCEDURE DATE:  02/18/2018                     INTERPRETATION:      INDICATIONS: Altered mental status.    TECHNIQUE:  Serial axial images were obtained from the skull base to the   vertex without the use of intravenous contrast. Sagittal and coronal   reformatted images were also obtained.    COMPARISON EXAMINATION: None.    FINDINGS:    VENTRICLES AND SULCI: No hydrocephalus. Parenchymal volume loss is   present which is commensurate with patient age.  INTRA-AXIAL: There is hypodensity of the periventricular and subcortical   white matter consistent with microangiopathic disease. Small old lacunar   infarction noted in the right thalamus. No intracranial mass effect,   acute hemorrhage, or midline shift is present. Gray-white differentiation   is maintained.  EXTRA-AXIAL: No extra-axial fluid collection is present.   VISUALIZED SINUSES: No air-fluid levels are identified.   VISUALIZED MASTOIDS:  Clear.  CALVARIUM:  No fracture.  MISCELLANEOUS:  The ocular lenses are absent consistent with prior   cataract surgery.    IMPRESSION:    No acute intracranial hemorrhage or CT evidence of acute transcortical   infarction. 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              < from: CT Angio Chest PE Protocol w/ IV Cont (02.18.18 @ 13:54) >  EXAM:  CT ANGIO CHEST PE PROTOCOL IC                          PROCEDURE DATE:  02/18/2018    Quantity of Contrast in Vial in ml: 100 Contrast Used: Optiray 350  Quantity of Contrast Wasted in ml: 5           INTERPRETATION:  CTA (CT angiography) ofthe CHEST dated 2/18/2018 1:54 PM    INDICATION: Shortness of breath. Assess for pulmonary embolism.    TECHNIQUE: CT angiography of the chest was performed during bolus   injection of intravenous contrast.  Post-processing including the   productionof axial, coronal and sagittal multiplanar reformatted images   and axial and coronal maximum intensity projections (MIPs) was performed.    PRIOR STUDY: None.    FINDINGS: No pulmonary embolism is seen. There is severe calcified   atherosclerotic plaque throughout the imaged aorta. Mild coronary artery   calcification. There is cardiomegaly. Post mitral valve replacement.   There is right cardiac chamber enlargement. Pulmonary artery is of normal   caliber. There is no evidence of aortic aneurysm.. No pericardial   effusion is seen. No mediastinal, hilar or axillary lymphadenopathy is   seen. A small coarse calcification versus surgical clip is noted in the   inferior left breast. A lobulated, calcified 1.9 cm structure is noted in   the upper outer quadrant of the right breast.    There are bilateral large pleural effusions. Patchy airspace opacities   are noted in the right lung, predominantly in the right upper lobe. There   is collapse of the right lower lobe and near complete collapse of the   left lower lobe.     Limited evaluation of the upper abdomen demonstrates no definite   abnormality.     Evaluation of the osseous structures demonstrates severe degenerative   changes of the spine. Subacute fracture involving the posterior aspect of   the left eighth rib. Sternal wires from prior sternotomy are present.   There is mild subcutaneous fat stranding throughout the chest wall   suggestive of anasarca.      IMPRESSION:     1. No acute or chronic pulmonary embolism.  2. Large bilateral pleural effusions. Atelectasis of the bilateral lower   lobes. Patchy airspace opacities in the right upper lobe most likely   representing pneumonia.  3. Cardiomegaly and post MVR. Moreover there is right cardiac chamber   enlargement andright-sided congestive heart failure cannot be excluded.   The pulmonary artery however is within normal limits in caliber.  4. Lobulated, calcified 1.9 cm structure in the upper outer quadrant of   the right breast. Clinical and mammographic correlation.  4. Mild subcutaneous fat stranding throughout the chest wall suggestive   of anasarca.              Vital Signs Last 24 Hrs  T(C): 37 (21 Feb 2018 09:36), Max: 37 (21 Feb 2018 09:36)  T(F): 98.6 (21 Feb 2018 09:36), Max: 98.6 (21 Feb 2018 09:36)  HR: 94 (21 Feb 2018 09:36) (81 - 112)  BP: 106/52 (21 Feb 2018 09:36) (90/42 - 137/71)  BP(mean): 74 (20 Feb 2018 16:00) (61 - 99)  RR: 19 (21 Feb 2018 09:36) (17 - 26)  SpO2: 92% (21 Feb 2018 09:36) (92% - 100%)    REVIEW OF SYSTEMS: patient is somnolent    CONSTITUTIONAL: No fever, weight loss, or fatigue  EYES: No eye pain, visual disturbances, or discharge  ENMT:  No difficulty hearing, tinnitus, vertigo; No sinus or throat pain  NECK: No pain or stiffness  BREASTS: No pain, masses, or nipple discharge  RESPIRATORY: No cough, wheezing, chills or hemoptysis; No shortness of breath  CARDIOVASCULAR: No chest pain, palpitations, dizziness, or leg swelling  GASTROINTESTINAL: No abdominal or epigastric pain. No nausea, vomiting, or hematemesis; No diarrhea or constipation. No melena or hematochezia.  GENITOURINARY: No dysuria, frequency, hematuria, or incontinence  NEUROLOGICAL: No headaches, memory loss, loss of strength, numbness, or tremors  SKIN: No itching, burning, rashes, or lesions   LYMPH NODES: No enlarged glands  ENDOCRINE: No heat or cold intolerance; No hair loss  MUSCULOSKELETAL: No joint pain or swelling; No muscle, back, or extremity pain  PSYCHIATRIC: No depression, anxiety, mood swings, or difficulty sleeping  HEME/LYMPH: No easy bruising, or bleeding gums  ALLERGY AND IMMUNOLOGIC: No hives or eczema      Physical Exam: frail elderly  woman lying in semi Fowlers position NAD, somnolent, arousable    HEENT: normocephalic,  anicteric,  atraumatic    Neck: supple, negative JVD, negative carotid bruits,    Chest: decreased breath sounds at bases    Cardiovascular: irregular rate and rhythm, II/VI systolic murmur    Abdomen: soft, non distended, non tender, negative rebound/guarding, normal bowel sounds, neg hepatosplenomegaly    Extremities: WWP, neg cyanosis/clubbing/edema, negative calf tenderness to palpation, negative Radha's sign    :  + [place] with yellow urine    Neurologic Exam:    Alert and oriented x 1 to person, somnolent follows commands    Cranial Nerves:     II:                       pupils equal, round and reactive to light, visual fields intact   III/ IV/VI:             extraocular movements intact, neg nystagmus, ptosis  V:                      facial sensation intact, V1-3 normal  VII:                     face symmetric, no droop, normal eye closure and smile  VIII:                    hearing intact to finger rub bilaterally  IX/ X:                 soft palate rise symmetrical  XI:                      head turning, shoulder shrug normal  XII:                     tongue midline    Motor Exam:    Bilateral UE:         poor effort > 3/5     Bilateral LE:        poor effort > 3/5    Sensory:             intact to LT/PP in all UE/LE dermatomes    DTR:                   = biceps/     triceps/     brachioradialis                            = patella/   medial hamstring/    ankle                            neg clonus                            neg Babinski                            neg Hoffmans    Finger to Nose:   did not participate secondary to somnolence    Heel to Shin:      did not participate secondary to somnolence    Rapid Alternating movements: did not participate secondary to somnolence    Joint Position Sense:  did not participate secondary to somnolence    Romberg:  not tested    Tandem Walking:  not tested    Gait:  not tested        PM&R Impression:    1) deconditioned  2) gait dysfunction: multi factorial      Recommendations:    1) Physical therapy focusing on therapeutic exercises, bed mobility/transfer out of bed evaluation, progressive ambulation with assistive devices.    2) Disposition Plan: return to [facility]/rehab

## 2018-02-21 NOTE — PROGRESS NOTE ADULT - SUBJECTIVE AND OBJECTIVE BOX
CC/ HPI Patient is a 89 year old female with CAD (s/p 3-vessel CABG), MV repair, sCHF, HTN, admitted from rehab center where she was found to be altered, tachypneic and in respiratory distress, admitted to the CCU for CHF exacerbation complicated by hypoxic hypercapnic respiratory failure, atrial fibrillation with RVR, pneumonia, seen this morning without acute respiratory complaint    PAST MEDICAL & SURGICAL HISTORY:  Hyperlipidemia  Hypothyroid  Hypertension  Atherosclerosis  S/P CABG x 3    SOCHX:  -  alcohol    FMHX: FA/MO  - contributory     ROS reviewed below with positive findings marked (+) :  GEN:  fever, chills ENT: tracheostomy,   epistaxis,  sinusitis COR: CAD, CHF,  HTN, dysrhythmia PUL: COPD, ILD, asthma, pneumonia GI: PEG, dysphagia, hemorrhage, other KATELYN: kidney disease, electrolyte disorder HEM:  anemia, thrombus, coagulopathy, cancer ENDO:  thyroid disease, diabetes mellitus CNS:  dementia, stroke, seizure, PSY:  depression, anxiety, other      MEDICATIONS  (STANDING):  ALBUTerol/ipratropium for Nebulization 3 milliLiter(s) Nebulizer every 4 hours  artificial  tears Solution 1 Drop(s) Both EYES three times a day  aspirin  chewable 81 milliGRAM(s) Oral daily  atorvastatin 40 milliGRAM(s) Oral at bedtime  dextrose 5%. 1000 milliLiter(s) (70 mL/Hr) IV Continuous <Continuous>  heparin  Injectable 5000 Unit(s) SubCutaneous every 8 hours  metoprolol      tartrate Suspension 6.25 milliGRAM(s) Oral every 12 hours  piperacillin/tazobactam IVPB. 3.375 Gram(s) IV Intermittent every 6 hours    MEDICATIONS  (PRN):      Vital Signs Last 24 Hrs  T(C): 37 (21 Feb 2018 09:36), Max: 37 (21 Feb 2018 09:36)  T(F): 98.6 (21 Feb 2018 09:36), Max: 98.6 (21 Feb 2018 09:36)  HR: 94 (21 Feb 2018 09:36) (81 - 112)  BP: 106/52 (21 Feb 2018 09:36) (90/42 - 137/71)  BP(mean): 74 (20 Feb 2018 16:00) (61 - 77)  RR: 19 (21 Feb 2018 09:36) (17 - 26)  SpO2: 92% (21 Feb 2018 09:36) (92% - 100%)    GENERAL:         comfortable,  - distress.  HEENT:            - trauma,  - icterus,  - injection,  - nasal discharge.  NECK:              - jugular venous distention, - thyromegaly.  LYMPH:           - lymphadenopathy, - masses.  RESP:               + crackles,   - rhonchi,   - wheezes.   COR:                S1S2   - gallops,  - rubs.  ABD:                bowel sounds,   soft, - tender, - distended, - organomegaly.  EXT/MSC:         - cyanosis,  - clubbing,  - edema.    NEURO:              alert,   responds to stimuli.    ABG - ( 19 Feb 2018 14:04 )  pH: 7.40  /  pCO2: 55    /  pO2: 210   / HCO3: 34    / Base Excess: 7.5   /  SaO2: 100                           13.0   8.9   )-----------( 208      ( 21 Feb 2018 07:51 )             42.8     02-21    151<H>  |  109<H>  |  30<H>  ----------------------------<  141<H>  3.6   |  32<H>  |  0.81        CT Angio Chest PE Protocol (02.18.18)  No acute or chronic pulmonary embolism.  2. Large bilateral pleural effusions. Atelectasis of the bilateral lower lobes. Patchy airspace opacities in the right upper lobe most likely representing pneumonia.      ASSESSMENT/PLAN    1) Congestive heart failure  2) Pneumonia/atelectasis  3) Pulmonary hypertension  4) Status post respiratory failure      Oxygen as needed for satisfactory SpO2 to avoid hypercapnia  NIPPV as needed  Bronchodilators:  Atrovent/ albuterol q 4 – 6 hours as needed  ID/Antibiotics: Zosyn  Cardiac/HTN: diuresis as needed  GI: Rx/ prophylaxis c PPI/H2B  Heme: Rx/VT prophylaxis  Discussed with Dr. Gan

## 2018-02-21 NOTE — PROGRESS NOTE ADULT - PROBLEM SELECTOR PLAN 6
Pt currently normotensive  -Holding home metoprolol 12.5 BID, Lisinopril 2.5 mg in setting of sepsis

## 2018-02-21 NOTE — PHYSICAL THERAPY INITIAL EVALUATION ADULT - IMPAIRMENTS FOUND, PT EVAL
fine motor/gait, locomotion, and balance/muscle strength/poor safety awareness/gross motor/cognitive impairment/aerobic capacity/endurance/arousal, attention, and cognition

## 2018-02-21 NOTE — PROGRESS NOTE ADULT - PROBLEM SELECTOR PLAN 4
Pt meeting sepsis criteria on admission (tachypnea, tachycardia and suspected PNA on CT chest).   -CT chest with patchy airspace opacities in the right upper lobe representing pneumonia.  RVP negative.  -c/w Vanc+Zosyn  -vanc trough 2/21 at noon

## 2018-02-21 NOTE — PROGRESS NOTE ADULT - SUBJECTIVE AND OBJECTIVE BOX
OVERNIGHT EVENTS: JESÚS    SUBJECTIVE: Patient reports feeling well but tired. Denies pain or nausea. States she has little appetite. No additional complaints.     Vital Signs Last 12 Hrs  T(F): 98.6 (02-21-18 @ 09:36), Max: 98.6 (02-21-18 @ 09:36)  HR: 94 (02-21-18 @ 09:36) (81 - 94)  BP: 106/52 (02-21-18 @ 09:36) (106/52 - 137/71)  BP(mean): --  RR: 19 (02-21-18 @ 09:36) (18 - 19)  SpO2: 92% (02-21-18 @ 09:36) (92% - 100%)  I&O's Summary    20 Feb 2018 07:01  -  21 Feb 2018 07:00  --------------------------------------------------------  IN: 1440 mL / OUT: 1080 mL / NET: 360 mL    21 Feb 2018 07:01  -  21 Feb 2018 15:09  --------------------------------------------------------  IN: 0 mL / OUT: 300 mL / NET: -300 mL    PHYSICAL EXAM:  Constitutional: NAD, comfortable in bed.  HEENT: NC/AT, PERRLA, EOMI, no conjunctival pallor or scleral icterus, dry MM  Neck: Supple, no JVD  Respiratory: Normal rate, rhythm, depth, effort. Bibasilar crackles.   Cardiovascular: RRR, normal S1 and S2, no m/r/g.   Gastrointestinal: +BS, soft NTND, no guarding or rebound tenderness, no palpable masses   Extremities: wwp; no cyanosis, clubbing or edema.   Vascular: Pulses equal and strong throughout.   Neurological: AAOx2, no CN deficits, strength and sensation intact throughout.   Skin: No gross skin abnormalities or rashes    LABS:                        13.0   8.9   )-----------( 208      ( 21 Feb 2018 07:51 )             42.8     02-21    151<H>  |  109<H>  |  30<H>  ----------------------------<  141<H>  3.6   |  32<H>  |  0.81    Ca    8.7      21 Feb 2018 07:51  Mg     2.8     02-21    RADIOLOGY & ADDITIONAL TESTS:    MEDICATIONS  (STANDING):  ALBUTerol/ipratropium for Nebulization 3 milliLiter(s) Nebulizer every 4 hours  artificial  tears Solution 1 Drop(s) Both EYES three times a day  aspirin  chewable 81 milliGRAM(s) Oral daily  atorvastatin 40 milliGRAM(s) Oral at bedtime  dextrose 5%. 1000 milliLiter(s) (70 mL/Hr) IV Continuous <Continuous>  heparin  Injectable 5000 Unit(s) SubCutaneous every 8 hours  metoprolol      tartrate Suspension 6.25 milliGRAM(s) Oral every 12 hours  piperacillin/tazobactam IVPB. 3.375 Gram(s) IV Intermittent every 6 hours    MEDICATIONS  (PRN):

## 2018-02-21 NOTE — PROGRESS NOTE ADULT - PROBLEM SELECTOR PLAN 3
Chronic. however risk of anticoagulation outweighs benefits given pt's age and functional status  -[id number] 5, family did not want anticoagulation  -Lopressor for rate control

## 2018-02-21 NOTE — PROGRESS NOTE ADULT - PROBLEM SELECTOR PLAN 2
Improved from yesterday. Multifactorial, sepsis from PNA + acute HF decompensation. Unclear if patient will return to baseline, will continue to follow. Improved from yesterday. Multifactorial, sepsis from PNA + acute HF decompensation. Unclear if patient will return to baseline, will continue to follow. Baseline not totally clear since son has not seen her, only been told how she was doing.  Does not appear to be able to live independently.  At minimum will need ZAY

## 2018-02-21 NOTE — PROGRESS NOTE ADULT - ASSESSMENT
89F with a PMH of CAD (s/p 3-vessel CABG), MV repair, sCHF, HTN, HLD, hypothyroidism, and osteoporosis who presented from nursing/rehab center where she was found to be altered, tachypneic and in respiratory distress, admitted to the CCU for CHF exacerbation and hypoxic hypercapnic respiratory failure c/b HCAP and Afib RVR

## 2018-02-21 NOTE — PHYSICAL THERAPY INITIAL EVALUATION ADULT - CRITERIA FOR SKILLED THERAPEUTIC INTERVENTIONS
therapy frequency/functional limitations in following categories/risk reduction/prevention/rehab potential/anticipated discharge recommendation/impairments found

## 2018-02-21 NOTE — PROGRESS NOTE ADULT - PROBLEM SELECTOR PLAN 8
Pt with Hx of hypothyroidism per documentation from rehab/nursing facility but no synthroid listed   -f/u TFT although patient is acutely ill

## 2018-02-21 NOTE — PROGRESS NOTE ADULT - PROBLEM SELECTOR PLAN 6
Pt currently normotensive  -c/w home metoprolol 12.5 BID  -holding Lisinopril 2.5 mg in setting of sepsis w/ history of 3 vessel CABG  -no evidence of ACS at this time  -holding BB, ACEI  -c/w 81mg ASA

## 2018-02-21 NOTE — PROGRESS NOTE ADULT - SUBJECTIVE AND OBJECTIVE BOX
awr sef     SUBJECTIVE: Patient transferred from CCU to 7U, more awake than yesterday. Opening eyes, engaging in conversation. Appears confused, does not know where she is or what happened to her, denies any active symptoms - no pain or SOB.    ROS:  DYSPNEA: NO  NAUS/VOM: nO	  SECRETIONS: No  AGITATION: No  Pain (Y/N): No, no signs of discomfort.       -Provocation/Palliation: Denies any pain  -Quality/Quantity: Denies any pain  -Radiating: Denies any pain  -Severity: Denies any pain  -Timing/Frequency: Denies any pain  -Impact on ADLs: Denies any pain    OTHER REVIEW OF SYSTEMS: Denies any other complaints.     PEx:  T(C): 37 (02-21-18 @ 09:36), Max: 37 (02-21-18 @ 09:36)  HR: 94 (02-21-18 @ 09:36) (81 - 112)  BP: 106/52 (02-21-18 @ 09:36) (90/42 - 137/71)  RR: 19 (02-21-18 @ 09:36) (17 - 26)  SpO2: 92% (02-21-18 @ 09:36) (92% - 100%)  Wt(kg): --    GENERAL: Frail elderly female  HEENT: Dry mucous membranes      	  NECK: Supple -JVD           CVS: Irregular, no rubs/murmurs/gallops.           	  RESP: Clear bilateral breath sounds.        	  GI: +BS, no tenderness on palpation             	  : +Prince	  MUSC: Weakness, moving all extremities.        	  NEURO: Awake, oriented to self, not to time or place.   PSYCH: Calm, no agitation           SKIN: Pale         	   LYMPH: No LAD      	     ALLERGIES: aminocaproic acid (Unknown)    OPIATE NAÏVE (Y/N): Yes, istop checked on initial consult.    MEDICATIONS: REVIEWED  MEDICATIONS  (STANDING):  ALBUTerol/ipratropium for Nebulization 3 milliLiter(s) Nebulizer every 4 hours  artificial  tears Solution 1 Drop(s) Both EYES three times a day  aspirin  chewable 81 milliGRAM(s) Oral daily  atorvastatin 40 milliGRAM(s) Oral at bedtime  dextrose 5%. 1000 milliLiter(s) (70 mL/Hr) IV Continuous <Continuous>  heparin  Injectable 5000 Unit(s) SubCutaneous every 8 hours  metoprolol      tartrate Suspension 6.25 milliGRAM(s) Oral every 12 hours  piperacillin/tazobactam IVPB. 3.375 Gram(s) IV Intermittent every 6 hours    MEDICATIONS  (PRN):    LABS: REVIEWED    IMAGING: REVIEWED    ADVANCED DIRECTIVES:  DNR/DNI         DECISION MAKER and LEGAL SURROGATE: Son - Germán Sosa     PSYCHOSOCIAL-SPIRITUAL ASSESSMENT:       Reviewed       Care plan unchanged    GOALS OF CARE DISCUSSION       Palliative care info/counseling provided	           See previous Palliative Medicine Note       Documentation of GOC: atient is DNR/DNI. Son is main decision maker. Goals not fully delineated - pending on how patient's MS and functional status continue to evolve after medical treatment.  	      REFERRALS	        Palliative Med        Unit SW/Case Mgmt       Patient/Family Support       Massage Therapy       Music Therapy        CRITICAL CARE TIME PROVIDED TO UNSTABLE PT W/ ORGAN FAILURE	   Start:               End:  	       Minutes:              > 50% OF THE TIME SPENT IN COUNSELING AND COORDINATING CARE 	   Start:               End:  	       Minutes:      PROLONGED SERVICE             FACE TO FACE:    PT            PT & FAMILY	   Start:               End:  	       Minutes:      Advance Care Planning Time: CC: SOB, AMS    SUBJECTIVE: Patient transferred from CCU to 7U, more awake than yesterday. Opening eyes, engaging in conversation. Appears confused, does not know where she is or what happened to her, denies any active symptoms - no pain or SOB. Cannot provide detailed answers    ROS:  DYSPNEA: NO  NAUS/VOM: nO	  SECRETIONS: No  AGITATION: No  Pain (Y/N): No, no signs of discomfort.       -Provocation/Palliation: Denies any pain  -Quality/Quantity: Denies any pain  -Radiating: Denies any pain  -Severity: Denies any pain  -Timing/Frequency: Denies any pain  -Impact on ADLs: Denies any pain    OTHER REVIEW OF SYSTEMS: Denies any other complaints.     PEx:  T(C): 37 (02-21-18 @ 09:36), Max: 37 (02-21-18 @ 09:36)  HR: 94 (02-21-18 @ 09:36) (81 - 112)  BP: 106/52 (02-21-18 @ 09:36) (90/42 - 137/71)  RR: 19 (02-21-18 @ 09:36) (17 - 26)  SpO2: 92% (02-21-18 @ 09:36) (92% - 100%)  Wt(kg): --    GENERAL: Frail elderly female  HEENT: Dry mucous membranes      	  NECK: Supple -JVD           CVS: Irregular with rapid heart rate, no rubs/murmurs/gallops.           	  RESP: Clear bilateral breath sounds.        	  GI: +BS, no tenderness on palpation             	  : +Prince	  MUSC: Weakness, moving all extremities.        	  NEURO: Awake, oriented to self, not to time or place.   PSYCH: Calm, no agitation           SKIN: Pale         	   LYMPH: No LAD      	     ALLERGIES: aminocaproic acid (Unknown)    OPIATE NAÏVE (Y/N): Yes, istop checked on initial consult.    MEDICATIONS: REVIEWED  MEDICATIONS  (STANDING):  ALBUTerol/ipratropium for Nebulization 3 milliLiter(s) Nebulizer every 4 hours  artificial  tears Solution 1 Drop(s) Both EYES three times a day  aspirin  chewable 81 milliGRAM(s) Oral daily  atorvastatin 40 milliGRAM(s) Oral at bedtime  dextrose 5%. 1000 milliLiter(s) (70 mL/Hr) IV Continuous <Continuous>  heparin  Injectable 5000 Unit(s) SubCutaneous every 8 hours  metoprolol      tartrate Suspension 6.25 milliGRAM(s) Oral every 12 hours  piperacillin/tazobactam IVPB. 3.375 Gram(s) IV Intermittent every 6 hours    MEDICATIONS  (PRN):    LABS: REVIEWED                        13.0   8.9   )-----------( 208      ( 21 Feb 2018 07:51 )             42.8   02-21    151<H>  |  109<H>  |  30<H>  ----------------------------<  141<H>  3.6   |  32<H>  |  0.81    Ca    8.7      21 Feb 2018 07:51  Mg     2.8     02-21        IMAGING: REVIEWED  no films since 2/12/2018    ADVANCED DIRECTIVES:  DNR/DNI         DECISION MAKER and LEGAL SURROGATE: Son - Germán Sosa     PSYCHOSOCIAL-SPIRITUAL ASSESSMENT:       Reviewed- son lives in [place].  Has nto seen patient for severl years, but keeps in close contact with [facility]       Care plan will need to be based on the fact that patient will likely need long term placement.  Her apartment is no longer available to her even though she went to [facility] originally in November 2017 for [facility]    GOALS OF CARE DISCUSSION       Palliative care info/counseling provided	           See previous Palliative Medicine Note       Documentation of GOC: atient is DNR/DNI. Son is main decision maker. Goals not fully delineated - pending on how patient's MS and functional status continue to evolve after medical treatment.  	      REFERRALS	        Palliative Med        Unit SW/Case Mgmt       Patient/Family Support       Massage Therapy       Music Therapy

## 2018-02-21 NOTE — CONSULT NOTE ADULT - ASSESSMENT
89 yo F with PMH CHF, HTN, HLD, hypothyroidism, osteoporosis PSHx triple bypass and mitral valve repair, referred from NH/rehab ([facility]). Admitted to CCU for acute on chronic heart failure, rate control Afib/Aflutter with variable block, respiratory failure 2/2 CHF + HAP. Currently on IV abx and aggressive diuresis. DNR/DNI. Consult for Palliative Care for symptom management and discussion of Goals of Care in the setting of acute on chronic heart failure decompensation with respiratory failure. Palliative Care consult placed for symptom management and GOC discussions in the setting of acute on chronic HF.
89F with a PMH of CAD (s/p 3-vessel CABG), MV repair, sCHF, HTN, HLD, hypothyroidism, and osteoporosis who presented from nursing/rehab center where she was found to be altered, tachypneic and in respiratory distress, admitted to the CCU for CHF exacerbation and hypoxic hypercapnic respiratory failure c/b HCAP and Afib RVR    Problem/Plan - 1:  ·  Problem: Acute on chronic systolic congestive heart failure.  Plan: CTA PE protocol showed cardiomegaly and post MVR, right cardiac chamber  enlargement suggesting right-sided congestive heart failure.    -Echo demonstrating EF 50-55%, gross dilation  -Diuresed with PRN IV Lasix, will continue as needed   -Monitor I/O   -Holding BB, ACE-I/ARB in the setting of sepsis    #Pulmonary Hypertension  Likely class 2, no reported history of primary pulmonary disease   -70mmHg  -maintain euvolemic.     Problem/Plan - 2:  ·  Problem: Hypercapnic respiratory failure.  Plan: 2/2 CHF exacerbation and HAP. CT chest with large bilateral pleural effusions, atelectasis.   -BiPAP at night  -currently sating well on 2L NC.     Problem/Plan - 3:  ·  Problem: Afib. Plan: Chronic. however risk of anticoagulation outweighs benefits given pt's age and functional status  -[id number] 5, family did not want anticoagulation  -Lopressor for rate control.    Problem/Plan - 4:  ·  Problem: Pneumonia. Plan: Pt meeting sepsis criteria on admission (tachypnea, tachycardia and suspected PNA on CT chest).   -CT chest with patchy airspace opacities in the right upper lobe representing pneumonia.  RVP negative.  -c/w Vanc+Zosyn  -vanc trough 2/21 at noon.

## 2018-02-21 NOTE — PROGRESS NOTE ADULT - PROBLEM SELECTOR PLAN 3
Chronic. however risk of anticoagulation outweighs benefits given pt's age and functional status  -[id number] 5, family did not want anticoagulation  -Lopressor for rate control

## 2018-02-21 NOTE — PROGRESS NOTE ADULT - PROBLEM SELECTOR PLAN 4
PPSV 30%, needs assistance with all ADLs. At this point unclear how reversible her condition is. PPSV 30%, needs assistance with all ADLs. At this point unclear how reversible her condition is. However, at this time patient does not appear to be a hospice candidate

## 2018-02-21 NOTE — PROGRESS NOTE ADULT - PROBLEM SELECTOR PLAN 7
-c/w Atorvastatin 40 mg daily Pt currently normotensive  -c/w home metoprolol 12.5 BID  -holding Lisinopril 2.5 mg in setting of sepsis

## 2018-02-21 NOTE — PROGRESS NOTE ADULT - SUBJECTIVE AND OBJECTIVE BOX
89F PMH of CAD (s/p 3-vessel CABG), MV repair, HFpEF, HTN, HLD, hypothyroidism, and osteoporosis who presented from nursing/rehab center where she was found to be altered, tachypneic and in respiratory distress. She was placed on BIPAP. Vitals -126, /82, RR 17-27, SpO2 94% on BIPAP. CT head was negative for any acute pathology. EKG showed Aflutter with variable block, LAD, LVH with repolarization abnormality Labs were remarkable for Trop 0.02, pro-BNP 4552. VBG showed respiratory acidosis pH 7.23, pCO2 80, HCO3 33.  Bedside echo performed by cardio fellow showed EF 35-40%, LVH and hypokinetic RV. Chest X ray showed congestion and/or infiltrates with bilateral effusions. CTA PE protocol was ordered given hypoxia and signs of right heart failure. CTA was negative for PE, showed large bilateral pleural effusions and atelectasis of the bilateral lower lobes with patchy airspace opacities in the right upper lobe most likely representing pneumonia(started on Vanc+Zosyn). Pt received 40 mg IV lasix and was transfered to CCU for further management. Upon arrival in the unit, the patient remained on BIPAP and altered, minimally responsive. Formal Echo showed EF 50-55%, Pulm HTN 70mmhg, grossly dilated.  Patient diuresed adequately but with persistent Afib RVR which responded to lopressor.     INTERVAL HPI/OVERNIGHT EVENTS:  Episodes of Afib RVR which responded to lopressor    Vital Signs Last 24 Hrs  T(C): 36.4 (20 Feb 2018 12:22), Max: 37.1 (19 Feb 2018 16:46)  T(F): 97.6 (20 Feb 2018 12:22), Max: 98.8 (19 Feb 2018 16:46)  HR: 84 (20 Feb 2018 15:10) (84 - 122)  BP: 105/49 (20 Feb 2018 15:10) (90/42 - 135/63)  BP(mean): 72 (20 Feb 2018 15:10) (61 - 116)  ABP: --  ABP(mean): --  RR: 23 (20 Feb 2018 15:10) (12 - 26)  SpO2: 98% (20 Feb 2018 15:10) (91% - 100%)    PHYSICAL EXAM:  Constitutional: NAD, lying in bed, elderly, frail, somnolent  HEENT: no eye discharge, no nasal discharge, no pharyngeal erythema, dry membranes   Neck: no lymphadenopathy, no JVD,  no carotid bruit  Cardiovascular: S1 and S2, irregular irregular,  2/6 KOFI loudest over right 2nd intercostal, R/G, non-displaced PMI  Respiratory: no wheezing, no rhonchi, no cough, decreased breath sounds over right RLL, no crackles   Gastrointestinal: BS+, soft, non-distended, non-tender, no ascites  : stockton catheter draining yellow urine   Extremities: warm to touch, no edema  Vascular: 2+ peripheral pulses  Neurological: somnolent and non following commands but opens eyes, sluggish responsive pupils, unable to assess strength and sensation testing  Musculoskeletal: no joint swelling  Skin: No rashes, no skin breakdown    MEDICATIONS  (STANDING):  ALBUTerol/ipratropium for Nebulization 3 milliLiter(s) Nebulizer every 4 hours  artificial  tears Solution 1 Drop(s) Both EYES three times a day  aspirin Suppository 300 milliGRAM(s) Rectal daily  atorvastatin 40 milliGRAM(s) Oral at bedtime  heparin  Injectable 5000 Unit(s) SubCutaneous every 8 hours  metoprolol      tartrate Suspension 6.25 milliGRAM(s) Oral every 12 hours  piperacillin/tazobactam IVPB. 3.375 Gram(s) IV Intermittent every 6 hours  sodium chloride 0.9%. 250 milliLiter(s) (125 mL/Hr) IV Continuous <Continuous>    MEDICATIONS  (PRN):      Allergies    aminocaproic acid (Unknown)    Intolerances        LABS:                        13.3   11.0  )-----------( 230      ( 20 Feb 2018 06:40 )             44.1     02-20    148<H>  |  105  |  36<H>  ----------------------------<  71  3.9   |  34<H>  |  1.02    Ca    8.9      20 Feb 2018 06:40  Phos  4.4     02-19  Mg     2.7     02-20            RADIOLOGY & ADDITIONAL TESTS: Reviewed

## 2018-02-21 NOTE — PHYSICAL THERAPY INITIAL EVALUATION ADULT - ORIENTATION, REHAB EVAL
person/able to state that she is in a hospital but does not know the name, states that it is February 1918

## 2018-02-21 NOTE — PROGRESS NOTE ADULT - PROBLEM SELECTOR PLAN 3
Cardiac disease has been ongoing for patient, given patient's age and significant cardiac history patient is very high risk for recurrent re-hospitalizations, per son patient had been stable in regards to her heart. If patient's MS and functional status do not improve despite medical therapy, patient would qualify for hospice if family interested. After discussion with son yesterday, he would like for patient to go back to rehab facility, he understands that if there is no improvement of patient's MS rehab would not be an option. Per son going back to home is not an option. Patient's son lives in [place]. Cardiac disease has been ongoing for patient, given patient's age and significant cardiac history patient is very high risk for recurrent re-hospitalizations, per son patient had been stable in regards to her heart. If patient's MS and functional status do not improve despite medical therapy, patient would qualify for hospice if family interested. After discussion with son yesterday, he would like for patient to go back to rehab facility, he understands that if there is no improvement of patient's MS rehab would not be an option. Per son going back to home is not an option. Patient's son lives in [place].  Jesús: patient remains tachycardic- will need further adjustment of cardia meds

## 2018-02-21 NOTE — PROGRESS NOTE ADULT - ASSESSMENT
89 yo F with PMH CHF, HTN, HLD, hypothyroidism, osteoporosis PSHx triple bypass and mitral valve repair, referred from NH/rehab ([facility]). Admitted to CCU for acute on chronic heart failure, rate control Afib/Aflutter with variable block, respiratory failure 2/2 CHF + HAP. Currently on IV abx and aggressive diuresis. DNR/DNI. Consult for Palliative Care for symptom management and discussion of Goals of Care in the setting of acute on chronic heart failure decompensation with respiratory failure. Palliative Care consult placed for symptom management and GOC discussions in the setting of acute on chronic HF.

## 2018-02-21 NOTE — PHYSICAL THERAPY INITIAL EVALUATION ADULT - ADDITIONAL COMMENTS
Unable to assess social hx or prior level of function 2/2 pt AMS and w/ increased lethargy. Pt arrives from [facility] rehab

## 2018-02-21 NOTE — CONSULT NOTE ADULT - CONSULT REASON
PM&R evaluation
Telemetry monitoring
medical evaluation
Goals of care and symptom management in the setting of acute CHF decompensation.

## 2018-02-21 NOTE — PROVIDER CONTACT NOTE (CHANGE IN STATUS NOTIFICATION) - ASSESSMENT
PD=854/76, CP=991, RR=20, T=97.8 axillary, O2 sat=100% on O@ 2 liters nc. Patient appears comfortable.

## 2018-02-22 DIAGNOSIS — J15.6 PNEUMONIA DUE TO OTHER GRAM-NEGATIVE BACTERIA: ICD-10-CM

## 2018-02-22 LAB
ANION GAP SERPL CALC-SCNC: 7 MMOL/L — SIGNIFICANT CHANGE UP (ref 5–17)
BUN SERPL-MCNC: 22 MG/DL — SIGNIFICANT CHANGE UP (ref 7–23)
CALCIUM SERPL-MCNC: 8.2 MG/DL — LOW (ref 8.4–10.5)
CHLORIDE SERPL-SCNC: 107 MMOL/L — SIGNIFICANT CHANGE UP (ref 96–108)
CO2 SERPL-SCNC: 30 MMOL/L — SIGNIFICANT CHANGE UP (ref 22–31)
CREAT SERPL-MCNC: 0.79 MG/DL — SIGNIFICANT CHANGE UP (ref 0.5–1.3)
GLUCOSE SERPL-MCNC: 178 MG/DL — HIGH (ref 70–99)
MAGNESIUM SERPL-MCNC: 2.6 MG/DL — SIGNIFICANT CHANGE UP (ref 1.6–2.6)
POTASSIUM SERPL-MCNC: 4.9 MMOL/L — SIGNIFICANT CHANGE UP (ref 3.5–5.3)
POTASSIUM SERPL-SCNC: 4.9 MMOL/L — SIGNIFICANT CHANGE UP (ref 3.5–5.3)
SODIUM SERPL-SCNC: 144 MMOL/L — SIGNIFICANT CHANGE UP (ref 135–145)
TSH SERPL-MCNC: 5.41 UIU/ML — HIGH (ref 0.35–4.94)

## 2018-02-22 PROCEDURE — 99232 SBSQ HOSP IP/OBS MODERATE 35: CPT

## 2018-02-22 RX ORDER — SODIUM CHLORIDE 9 MG/ML
1000 INJECTION, SOLUTION INTRAVENOUS
Qty: 0 | Refills: 0 | Status: DISCONTINUED | OUTPATIENT
Start: 2018-02-22 | End: 2018-02-23

## 2018-02-22 RX ORDER — METOPROLOL TARTRATE 50 MG
6.25 TABLET ORAL EVERY 8 HOURS
Qty: 0 | Refills: 0 | Status: DISCONTINUED | OUTPATIENT
Start: 2018-02-22 | End: 2018-02-23

## 2018-02-22 RX ADMIN — Medication 3 MILLILITER(S): at 21:59

## 2018-02-22 RX ADMIN — HEPARIN SODIUM 5000 UNIT(S): 5000 INJECTION INTRAVENOUS; SUBCUTANEOUS at 21:59

## 2018-02-22 RX ADMIN — HEPARIN SODIUM 5000 UNIT(S): 5000 INJECTION INTRAVENOUS; SUBCUTANEOUS at 06:35

## 2018-02-22 RX ADMIN — PIPERACILLIN AND TAZOBACTAM 200 GRAM(S): 4; .5 INJECTION, POWDER, LYOPHILIZED, FOR SOLUTION INTRAVENOUS at 12:21

## 2018-02-22 RX ADMIN — Medication 6.25 MILLIGRAM(S): at 18:25

## 2018-02-22 RX ADMIN — Medication 3 MILLILITER(S): at 15:50

## 2018-02-22 RX ADMIN — PIPERACILLIN AND TAZOBACTAM 200 GRAM(S): 4; .5 INJECTION, POWDER, LYOPHILIZED, FOR SOLUTION INTRAVENOUS at 00:26

## 2018-02-22 RX ADMIN — Medication 1 DROP(S): at 21:59

## 2018-02-22 RX ADMIN — PIPERACILLIN AND TAZOBACTAM 200 GRAM(S): 4; .5 INJECTION, POWDER, LYOPHILIZED, FOR SOLUTION INTRAVENOUS at 06:35

## 2018-02-22 RX ADMIN — Medication 40 MILLIEQUIVALENT(S): at 00:27

## 2018-02-22 RX ADMIN — Medication 1 DROP(S): at 15:53

## 2018-02-22 RX ADMIN — Medication 3 MILLILITER(S): at 02:17

## 2018-02-22 RX ADMIN — SODIUM CHLORIDE 70 MILLILITER(S): 9 INJECTION, SOLUTION INTRAVENOUS at 01:02

## 2018-02-22 RX ADMIN — Medication 1 DROP(S): at 06:35

## 2018-02-22 RX ADMIN — SODIUM CHLORIDE 50 MILLILITER(S): 9 INJECTION, SOLUTION INTRAVENOUS at 12:24

## 2018-02-22 RX ADMIN — Medication 6.25 MILLIGRAM(S): at 10:10

## 2018-02-22 RX ADMIN — ATORVASTATIN CALCIUM 40 MILLIGRAM(S): 80 TABLET, FILM COATED ORAL at 21:59

## 2018-02-22 RX ADMIN — Medication 3 MILLILITER(S): at 18:25

## 2018-02-22 RX ADMIN — Medication 3 MILLILITER(S): at 06:35

## 2018-02-22 RX ADMIN — HEPARIN SODIUM 5000 UNIT(S): 5000 INJECTION INTRAVENOUS; SUBCUTANEOUS at 15:51

## 2018-02-22 RX ADMIN — Medication 3 MILLILITER(S): at 11:33

## 2018-02-22 RX ADMIN — Medication 81 MILLIGRAM(S): at 12:23

## 2018-02-22 NOTE — PROGRESS NOTE ADULT - PROBLEM SELECTOR PLAN 3
Chronic. however risk of anticoagulation outweighs benefits given pt's age and functional status  -[id number] 5, family did not want anticoagulation  -Lopressor for rate control, increased to 6.25 TID

## 2018-02-22 NOTE — PROGRESS NOTE ADULT - SUBJECTIVE AND OBJECTIVE BOX
OVERNIGHT EVENTS: Patient tachy to 130-140 overnight. Given additional Metoprolol.     SUBJECTIVE: Patient reports feeling well. States she has no difficulty breathing. Reports she is not eating much and has little appetite. No additional complaints.    Vital Signs Last 12 Hrs  T(F): 98 (02-22-18 @ 10:15), Max: 98 (02-22-18 @ 05:11)  HR: 98 (02-22-18 @ 10:15) (98 - 117)  BP: 102/66 (02-22-18 @ 10:15) (101/68 - 114/59)  BP(mean): --  RR: 19 (02-22-18 @ 10:15) (19 - 21)  SpO2: 100% (02-22-18 @ 10:15) (97% - 100%)  I&O's Summary    21 Feb 2018 07:01  -  22 Feb 2018 07:00  --------------------------------------------------------  IN: 1065 mL / OUT: 550 mL / NET: 515 mL    PHYSICAL EXAM:  Constitutional: NAD, comfortable in bed.  HEENT: NC/AT, PERRLA, EOMI, no conjunctival pallor or scleral icterus, dry MM  Neck: Supple, no JVD  Respiratory: Breathing comfortably on NC. Normal rate, rhythm, depth, effort. CTAB.   Cardiovascular: RRR, normal S1 and S2, no m/r/g.   Gastrointestinal: +BS, soft NTND, no guarding or rebound tenderness, no palpable masses   Extremities: wwp; no cyanosis, clubbing or edema.   Vascular: Pulses equal and strong throughout.   Neurological: AAOx2, no CN deficits, strength and sensation intact throughout.   Skin: No gross skin abnormalities or rashes    LABS:                        13.0   8.9   )-----------( 208      ( 21 Feb 2018 07:51 )             42.8     02-21    147<H>  |  106  |  25<H>  ----------------------------<  217<H>  2.8<LL>   |  32<H>  |  0.76    Ca    8.0<L>      21 Feb 2018 18:52  Mg     2.8     02-21    RADIOLOGY & ADDITIONAL TESTS:    MEDICATIONS  (STANDING):  ALBUTerol/ipratropium for Nebulization 3 milliLiter(s) Nebulizer every 4 hours  artificial  tears Solution 1 Drop(s) Both EYES three times a day  aspirin  chewable 81 milliGRAM(s) Oral daily  atorvastatin 40 milliGRAM(s) Oral at bedtime  dextrose 5%. 1000 milliLiter(s) (70 mL/Hr) IV Continuous <Continuous>  heparin  Injectable 5000 Unit(s) SubCutaneous every 8 hours  metoprolol      tartrate Suspension 6.25 milliGRAM(s) Oral every 8 hours  piperacillin/tazobactam IVPB. 3.375 Gram(s) IV Intermittent every 6 hours    MEDICATIONS  (PRN):

## 2018-02-22 NOTE — PROGRESS NOTE ADULT - PROBLEM SELECTOR PLAN 5
w/ history of 3 vessel CABG  -no evidence of ACS at this time  -lopressor 6.25 TID  -holding ACEI  -c/w 81mg ASA

## 2018-02-22 NOTE — PROGRESS NOTE ADULT - SUBJECTIVE AND OBJECTIVE BOX
CC: SOB, AMS    SUBJECTIVE: Patient seen and examined at bedside.     ROS:  DYSPNEA: NO  NAUS/VOM: nO	  SECRETIONS: No  AGITATION: No  Pain (Y/N): No, no signs of discomfort.       -Provocation/Palliation: Denies any pain  -Quality/Quantity: Denies any pain  -Radiating: Denies any pain  -Severity: Denies any pain  -Timing/Frequency: Denies any pain  -Impact on ADLs: Denies any pain    OTHER REVIEW OF SYSTEMS: Denies any other complaints.     PHYSICAL EXAM:  Vital Signs Last 24 Hrs  T(C): 36.7 (22 Feb 2018 10:15), Max: 37.1 (21 Feb 2018 16:05)  T(F): 98 (22 Feb 2018 10:15), Max: 98.7 (21 Feb 2018 16:05)  HR: 98 (22 Feb 2018 10:15) (67 - 130)  BP: 102/66 (22 Feb 2018 10:15) (96/59 - 122/76)  BP(mean): --  RR: 19 (22 Feb 2018 10:15) (18 - 21)  SpO2: 100% (22 Feb 2018 10:15) (96% - 100%) I&O's Summary    21 Feb 2018 07:01  -  22 Feb 2018 07:00  --------------------------------------------------------  IN: 1065 mL / OUT: 550 mL / NET: 515 mL    GENERAL: Frail elderly female  HEENT: Dry mucous membranes      	  NECK: Supple -JVD           CVS: Irregular with rapid heart rate, no rubs/murmurs/gallops.           	  RESP: Clear bilateral breath sounds.        	  GI: +BS, no tenderness on palpation             	  : +Prince	  MUSC: Weakness, moving all extremities.        	  NEURO: Awake, oriented to self, not to time or place.   PSYCH: Calm, no agitation           SKIN: Pale         	   LYMPH: No LAD      	     ALLERGIES: aminocaproic acid (Unknown)    OPIATE NAÏVE (Y/N): Yes, istop checked on initial consult.    MEDICATIONS  (STANDING):  ALBUTerol/ipratropium for Nebulization 3 milliLiter(s) Nebulizer every 4 hours  artificial  tears Solution 1 Drop(s) Both EYES three times a day  aspirin  chewable 81 milliGRAM(s) Oral daily  atorvastatin 40 milliGRAM(s) Oral at bedtime  dextrose 5%. 1000 milliLiter(s) (70 mL/Hr) IV Continuous <Continuous>  heparin  Injectable 5000 Unit(s) SubCutaneous every 8 hours  metoprolol      tartrate Suspension 6.25 milliGRAM(s) Oral every 8 hours  piperacillin/tazobactam IVPB. 3.375 Gram(s) IV Intermittent every 6 hours    MEDICATIONS  (PRN):    LABS:                        13.0   8.9   )-----------( 208      ( 21 Feb 2018 07:51 )             42.8     02-21    147<H>  |  106  |  25<H>  ----------------------------<  217<H>  2.8<LL>   |  32<H>  |  0.76    Ca    8.0<L>      21 Feb 2018 18:52  Mg     2.8     02-21    IMAGING: REVIEWED  no films since 2/12/2018    ADVANCED DIRECTIVES:  DNR/DNI         DECISION MAKER and LEGAL SURROGATE: Son - Germán Sosa     PSYCHOSOCIAL-SPIRITUAL ASSESSMENT:       Reviewed- son lives in [place].  Has nto seen patient for severl years, but keeps in close contact with [facility]       Care plan will need to be based on the fact that patient will likely need long term placement. Her apartment is no longer available to her even though she went to [facility] originally in November 2017 for [facility]AY    GOALS OF CARE DISCUSSION       Palliative care info/counseling provided	           See previous Palliative Medicine Note       Documentation of GOC: atient is DNR/DNI. Son is main decision maker. Goals not fully delineated - pending on how patient's MS and functional status continue to evolve after medical treatment.  	      REFERRALS	        Palliative Med        Unit SW/Case Mgmt       Patient/Family Support       Massage Therapy       Music Therapy CC: SOB, AMS    SUBJECTIVE: Patient seen and examined at bedside. Able to answer yes/no questions, she knows she is in the hospital but gets confused as to why. Denies any current symptoms. MS appears same as yesterday.    ROS:  DYSPNEA: NO  NAUS/VOM: No	  SECRETIONS: No  AGITATION: No  Pain (Y/N): No, no signs of discomfort.       -Provocation/Palliation: Denies any pain  -Quality/Quantity: Denies any pain  -Radiating: Denies any pain  -Severity: Denies any pain  -Timing/Frequency: Denies any pain  -Impact on ADLs: Denies any pain    OTHER REVIEW OF SYSTEMS: Denies any other complaints.     PHYSICAL EXAM:  Vital Signs Last 24 Hrs  T(C): 36.7 (22 Feb 2018 10:15), Max: 37.1 (21 Feb 2018 16:05)  T(F): 98 (22 Feb 2018 10:15), Max: 98.7 (21 Feb 2018 16:05)  HR: 98 (22 Feb 2018 10:15) (67 - 130)  BP: 102/66 (22 Feb 2018 10:15) (96/59 - 122/76)  BP(mean): --  RR: 19 (22 Feb 2018 10:15) (18 - 21)  SpO2: 100% (22 Feb 2018 10:15) (96% - 100%) I&O's Summary    21 Feb 2018 07:01  -  22 Feb 2018 07:00  --------------------------------------------------------  IN: 1065 mL / OUT: 550 mL / NET: 515 mL    GENERAL: Frail elderly female  HEENT: Dry mucous membranes      	  NECK: Supple +JVD           CVS: Irregular with rapid heart rate, no rubs/murmurs/gallops.           	  RESP: Clear bilateral breath sounds.        	  GI: +BS, no tenderness on palpation             	  : +Prince	  MUSC: Weakness, moving all extremities.        	  NEURO: Awake, oriented to self, not to time or place.   PSYCH: Calm, no agitation           SKIN: Pale         	   LYMPH: No LAD      	     ALLERGIES: aminocaproic acid (Unknown)    OPIATE NAÏVE (Y/N): Yes, istop checked on initial consult.    MEDICATIONS  (STANDING):  ALBUTerol/ipratropium for Nebulization 3 milliLiter(s) Nebulizer every 4 hours  artificial  tears Solution 1 Drop(s) Both EYES three times a day  aspirin  chewable 81 milliGRAM(s) Oral daily  atorvastatin 40 milliGRAM(s) Oral at bedtime  dextrose 5%. 1000 milliLiter(s) (70 mL/Hr) IV Continuous <Continuous>  heparin  Injectable 5000 Unit(s) SubCutaneous every 8 hours  metoprolol      tartrate Suspension 6.25 milliGRAM(s) Oral every 8 hours  piperacillin/tazobactam IVPB. 3.375 Gram(s) IV Intermittent every 6 hours    MEDICATIONS  (PRN):    LABS:                        13.0   8.9   )-----------( 208      ( 21 Feb 2018 07:51 )             42.8     02-21    147<H>  |  106  |  25<H>  ----------------------------<  217<H>  2.8<LL>   |  32<H>  |  0.76    Ca    8.0<L>      21 Feb 2018 18:52  Mg     2.8     02-21    IMAGING: REVIEWED  no films since 2/12/2018    ADVANCED DIRECTIVES:  DNR/DNI         DECISION MAKER and LEGAL SURROGATE: Son - Germán Sosa     PSYCHOSOCIAL-SPIRITUAL ASSESSMENT:       Reviewed- son lives in [place]. Has not seen patient for several years, but keeps in close contact with patient and [facility].        Care plan will need to be based on the fact that patient will likely need long term placement. Her apartment is no longer available to her even though she went to [facility] originally in November 2017 for [facility].    GOALS OF CARE DISCUSSION       Palliative care info/counseling provided	           See previous Palliative Medicine Note       Documentation of GOC: Patient is DNR/DNI. Son is main decision maker. MS and functional status continue to evolve after medical treatment.  	      REFERRALS	        Palliative Med        Unit SW/Case Mgmt       Patient/Family Support       Massage Therapy       Music Therapy CC: SOB, AMS    SUBJECTIVE: Patient seen and examined at bedside. Able to answer yes/no questions, she knows she is in the hospital but gets confused as to why. Denies any current symptoms. MS appears same as yesterday. According to the nurse is eating when fed    ROS:  DYSPNEA: NO  NAUS/VOM: No	  SECRETIONS: No  AGITATION: No  Pain (Y/N): No, no signs of discomfort.       -Provocation/Palliation: Denies any pain  -Quality/Quantity: Denies any pain  -Radiating: Denies any pain  -Severity: Denies any pain  -Timing/Frequency: Denies any pain  -Impact on ADLs: Denies any pain    OTHER REVIEW OF SYSTEMS: Denies any other complaints.     PHYSICAL EXAM:  Vital Signs Last 24 Hrs  T(C): 36.7 (22 Feb 2018 10:15), Max: 37.1 (21 Feb 2018 16:05)  T(F): 98 (22 Feb 2018 10:15), Max: 98.7 (21 Feb 2018 16:05)  HR: 98 (22 Feb 2018 10:15) (67 - 130)  BP: 102/66 (22 Feb 2018 10:15) (96/59 - 122/76)  BP(mean): --  RR: 19 (22 Feb 2018 10:15) (18 - 21)  SpO2: 100% (22 Feb 2018 10:15) (96% - 100%) I&O's Summary    21 Feb 2018 07:01  -  22 Feb 2018 07:00  --------------------------------------------------------  IN: 1065 mL / OUT: 550 mL / NET: 515 mL    GENERAL: Frail elderly female  HEENT: Dry mucous membranes  healing abrasions across bridge of nose and right eyebrow    	  NECK: Supple +JVD           CVS: Irregular with rapid heart rate, no rubs/murmurs/gallops.           	  RESP: Clear bilateral breath sounds.        	  GI: +BS, no tenderness on palpation             	  : +Prince	  MUSC: Weakness, moving all extremities.        	  NEURO: Awake, oriented to self, not to time or place.   PSYCH: Calm, no agitation           SKIN: Pale         	   LYMPH: No LAD      	     ALLERGIES: aminocaproic acid (Unknown)    OPIATE NAÏVE (Y/N): Yes, istop checked on initial consult.    MEDICATIONS  (STANDING):  ALBUTerol/ipratropium for Nebulization 3 milliLiter(s) Nebulizer every 4 hours  artificial  tears Solution 1 Drop(s) Both EYES three times a day  aspirin  chewable 81 milliGRAM(s) Oral daily  atorvastatin 40 milliGRAM(s) Oral at bedtime  dextrose 5%. 1000 milliLiter(s) (70 mL/Hr) IV Continuous <Continuous>  heparin  Injectable 5000 Unit(s) SubCutaneous every 8 hours  metoprolol      tartrate Suspension 6.25 milliGRAM(s) Oral every 8 hours  piperacillin/tazobactam IVPB. 3.375 Gram(s) IV Intermittent every 6 hours    MEDICATIONS  (PRN):    LABS:                        13.0   8.9   )-----------( 208      ( 21 Feb 2018 07:51 )             42.8     02-21    147<H>  |  106  |  25<H>  ----------------------------<  217<H>  2.8<LL>   |  32<H>  |  0.76    Ca    8.0<L>      21 Feb 2018 18:52  Mg     2.8     02-21    IMAGING: REVIEWED  no films since 2/12/2018    ADVANCED DIRECTIVES:  DNR/DNI         DECISION MAKER and LEGAL SURROGATE: Son - Germán Sosa     PSYCHOSOCIAL-SPIRITUAL ASSESSMENT:       Reviewed- son lives in [place]. Has not seen patient for several years, but keeps in close contact with patient and [facility].        Care plan will need to be based on the fact that patient will likely need long term placement. Her apartment is no longer available to her even though she went to [facility] originally in November 2017 for [facility].    GOALS OF CARE DISCUSSION       Palliative care info/counseling provided	           See previous Palliative Medicine Note       Documentation of GOC: Patient is DNR/DNI. Son is main decision maker. MS and functional status continue to evolve after medical treatment.  	      REFERRALS	        Palliative Med        Unit SW/Case Mgmt       Patient/Family Support       Massage Therapy       Music Therapy

## 2018-02-22 NOTE — PROGRESS NOTE ADULT - PROBLEM SELECTOR PLAN 4
PPSV 30%, needs assistance with all ADLs. At this point unclear how reversible her condition is. PPSV 30%, needs assistance with all ADLs. At this point is unclear how reversible her condition is, patient is still not able to care for herself.

## 2018-02-22 NOTE — PROGRESS NOTE ADULT - PROBLEM SELECTOR PLAN 3
Chronic. however risk of anticoagulation outweighs benefits given pt's age and functional status  -[id number], family did not want anticoagulation  -Lopressor for rate control, increased to 6.25 TID

## 2018-02-22 NOTE — PROGRESS NOTE ADULT - PROBLEM SELECTOR PLAN 1
Improved, Likely multifactorial, PNA + Heart Failure and pulmonary edema. Bilateral pleural effusions seen on CXR. Currently on oxygen. Improved, Likely multifactorial, PNA + Heart Failure and pulmonary edema. Bilateral pleural effusions seen on CXR. Currently on oxygen. Denies any SOB or any other symptoms.

## 2018-02-22 NOTE — PROGRESS NOTE ADULT - SUBJECTIVE AND OBJECTIVE BOX
OVERNIGHT EVENTS: Patient tachy to 130-140 overnight. Given additional Metoprolol.     SUBJECTIVE: Patient reports feeling well. States she has no difficulty breathing. Reports she is not eating much and has little appetite. No additional complaints.    Vital Signs Last 12 Hrs  T(F): 98 (02-22-18 @ 10:15), Max: 98 (02-22-18 @ 05:11)  HR: 98 (02-22-18 @ 10:15) (98 - 117)  BP: 102/66 (02-22-18 @ 10:15) (101/68 - 114/59)  BP(mean): --  RR: 19 (02-22-18 @ 10:15) (19 - 21)  SpO2: 100% (02-22-18 @ 10:15) (97% - 100%)  I&O's Summary    21 Feb 2018 07:01  -  22 Feb 2018 07:00  --------------------------------------------------------  IN: 1065 mL / OUT: 550 mL / NET: 515 mL    PHYSICAL EXAM:  Constitutional: NAD, comfortable in bed.c/o weakness  HEENT: NC/AT, PERRLA, EOMI, no conjunctival pallor or scleral icterus, dry MM  Neck: Supple, no JVD  Respiratory: Breathing comfortably on NC. Normal rate, rhythm, depth, effort. CTAB.   Cardiovascular: RRR, normal S1 and S2, no m/r/g.   Gastrointestinal: +BS, soft NTND, no guarding or rebound tenderness, no palpable masses   Extremities: wwp; no cyanosis, clubbing or edema.   Vascular: Pulses equal and strong throughout.   Neurological: AAOx2, no CN deficits, strength and sensation intact throughout.   Skin: No gross skin abnormalities or rashes    LABS:                        13.0   8.9   )-----------( 208      ( 21 Feb 2018 07:51 )             42.8     02-21    147<H>  |  106  |  25<H>  ----------------------------<  217<H>  2.8<LL>   |  32<H>  |  0.76    Ca    8.0<L>      21 Feb 2018 18:52  Mg     2.8     02-21    RADIOLOGY & ADDITIONAL TESTS:    MEDICATIONS  (STANDING):  ALBUTerol/ipratropium for Nebulization 3 milliLiter(s) Nebulizer every 4 hours  artificial  tears Solution 1 Drop(s) Both EYES three times a day  aspirin  chewable 81 milliGRAM(s) Oral daily  atorvastatin 40 milliGRAM(s) Oral at bedtime  dextrose 5%. 1000 milliLiter(s) (70 mL/Hr) IV Continuous <Continuous>  heparin  Injectable 5000 Unit(s) SubCutaneous every 8 hours  metoprolol      tartrate Suspension 6.25 milliGRAM(s) Oral every 8 hours  piperacillin/tazobactam IVPB. 3.375 Gram(s) IV Intermittent every 6 hours    MEDICATIONS  (PRN):

## 2018-02-22 NOTE — PROGRESS NOTE ADULT - PROBLEM SELECTOR PLAN 6
Patient is DNR/DNI. HCP (no documentation in the chart) - Main decision maker is son Germán Sosa. Already has MOLST in chart (filled out 2/18).   As discussed during the palliative IDT meeting and as identified during the patients PSSA screening the patient would benefit from: Palliative Med , Unit SW/Case Mgmt, Patient/Family Support, Massage Therapy, Music Therapy. Patient is DNR/DNI. HCP (no documentation in the chart) - Main decision maker is son Germán Sosa. Already has MOLST in chart (filled out 2/18).   As discussed during the palliative IDT meeting and as identified during the patients PSSA screening the patient would benefit from: Palliative Med , Unit SW/Case Mgmt, Patient/Family Support, Massage Therapy, Music Therapy.    Patient with advanced illness, but does not meet hospice criteria at this time

## 2018-02-22 NOTE — PROGRESS NOTE ADULT - ASSESSMENT
89F with a PMH of CAD (s/p 3-vessel CABG), MV repair, sCHF, HTN, HLD, hypothyroidism, and osteoporosis who presented from nursing/rehab center where she was found to be altered, tachypneic and in respiratory distress, admitted to the CCU for CHF exacerbation and hypoxic hypercapnic respiratory failure c/b HCAP and Afib RVR    Problem/Plan - 1:  ·  Problem: Acute on chronic systolic congestive heart failure.  Plan: CTA PE protocol showed cardiomegaly and post MVR, right cardiac chamber  enlargement suggesting right-sided congestive heart failure.    -Echo demonstrating EF 50-55%, gross dilation  -Diuresed with PRN IV Lasix, will continue as needed   -Monitor I/O   -Holding BB, ACE-I/ARB in the setting of sepsis    #Pulmonary Hypertension  Likely class 2, no reported history of primary pulmonary disease   -70mmHg  -maintain euvolemic.     Problem/Plan - 2:  ·  Problem: Hypercapnic respiratory failure.  Plan: 2/2 CHF exacerbation and HAP. CT chest with large bilateral pleural effusions, atelectasis.   -BiPAP at night  -currently sating well on 2L NC.     Problem/Plan - 3:  ·  Problem: Afib.  Plan: Chronic. however risk of anticoagulation outweighs benefits given pt's age and functional status  -[id number] 5, family did not want anticoagulation  -Lopressor for rate control.     Problem/Plan - 4:  ·  Problem: Pneumonia.  Plan: Pt meeting sepsis criteria on admission (tachypnea, tachycardia and suspected PNA on CT chest).   -CT chest with patchy airspace opacities in the right upper lobe representing pneumonia.  RVP negative.  -c/w Vanc+Zosyn  -vanc trough 2/21 at noon.     Problem/Plan - 5:  ·  Problem: Hypernatriuria. Plan: Patient developed hypernatremia, likely in the setting of diuresis with low PO intake.  -DC D5 1/2NS  -Start D5 @ 70cc/hr  -trend BMP, monitor lungs for fluid overload.

## 2018-02-22 NOTE — PROGRESS NOTE ADULT - PROBLEM SELECTOR PLAN 3
Cardiac disease has been ongoing for patient, given patient's age and significant cardiac history patient is very high risk for recurrent re-hospitalizations, per son patient had been stable in regards to her heart. If patient's MS and functional status do not improve despite medical therapy, patient would qualify for hospice if family interested. After discussion with son yesterday, he would like for patient to go back to rehab facility, he understands that if there is no improvement of patient's MS rehab would not be an option. Per son going back to home is not an option. Patient's son lives in [place]. Cardiac disease has been ongoing for patient, given patient's age and significant cardiac history patient is very high risk for recurrent re-hospitalizations, per son patient had been stable in regards to her heart. At the time patient does not appear to qualify for hospice services. After discussion with son, it is known that patient cannot return to her apartment, options would be for patient to go back to rehab facility, son understands that if there is no improvement of patient's MS rehab would not be an option. Patient's son and only living relative, lives in [place]. Heart failure seems improved with diuresis and rate control.     Cardiac disease has been ongoing for patient, given patient's age and significant cardiac history patient is very high risk for recurrent re-hospitalizations, per son patient had been stable in regards to her heart. At the time patient does not appear to qualify for hospice services. After discussion with son, it is known that patient cannot return to her apartment, options would be for patient to go back to rehab facility, son understands that if there is no improvement of patient's MS rehab would not be an option. Patient's son and only living relative, lives in [place].

## 2018-02-22 NOTE — PROGRESS NOTE ADULT - PROBLEM SELECTOR PLAN 1
CTA PE protocol showed cardiomegaly and post MVR, right cardiac chamber  enlargement suggesting right-sided congestive heart failure.    -Echo demonstrating EF 50-55%, gross dilation  -Diuresed with PRN IV Lasix, will continue as needed   -Monitor I/O   -restarted metoprolol tartrate 6.25 TID  -hold ACE-I/ARB in the setting of sepsis    #Pulmonary Hypertension  Likely class 2, no reported history of primary pulmonary disease   -70mmHg  -maintain euvolemic

## 2018-02-22 NOTE — PROGRESS NOTE ADULT - PROBLEM SELECTOR PLAN 4
Pt meeting sepsis criteria on admission (tachypnea, tachycardia and suspected PNA on CT chest).   -CT chest with patchy airspace opacities in the right upper lobe representing pneumonia.  RVP negative.  -DC Vanc  -c/w Zosyn

## 2018-02-22 NOTE — PROGRESS NOTE ADULT - PROBLEM SELECTOR PLAN 8
Pt with Hx of hypothyroidism per documentation from rehab/nursing facility but no synthroid listed   -TSH slightly elevated, will not start synthroid in setting of acute illness

## 2018-02-22 NOTE — PROGRESS NOTE ADULT - SUBJECTIVE AND OBJECTIVE BOX
Physical Medicine and Rehabilitation Progress Note:    Patient is a 89y old  Female who presents with a chief complaint of     HPI:  89 yo F with PMH CHF, HTN, HLD, hypothyroidism, osteoporosis PSHx triple bypass and mitral valve repair, presented from nursing/rehab center where she was found to be altered, tachypneic and in respiratory distress. In ED she was noted to be altered, tachypneic and tachycardic so she was placed on BIPAP. Vitals -126, /82, RR 17-27, SpO2 94% on BIPAP. CT head was negative for any acute pathology. EKG showed Aflutter with variable block, LAD, LVH with repolarization abnormality Labs were remarkable for Trop 0.02, pro-BNP 4552. VBG showed respiratory acidosis pH 7.23, pCO2 80, HCO3 33.  Bedside echo performed by cardio fellow showed EF 35-40%, LVH and hypokinetic RV. Chest X ray showed congestion and/or infiltrates with bilateral effusions. CTA PE protocol was ordered given hypoxia and signs of right heart failure. CTA was negative for PE, showed large bilateral pleural effusions and atelectasis of the bilateral lower lobes with patchy airspace opacities in the right upper lobe most likely representing pneumonia. Pt received 40 mg IV lasix and was transfered to CCU for further management. Upon arrival in the unit, the patient remained on BIPAP and altered, minimally responsive. (18 Feb 2018 16:13)                            13.0   8.9   )-----------( 208      ( 21 Feb 2018 07:51 )             42.8       02-21    147<H>  |  106  |  25<H>  ----------------------------<  217<H>  2.8<LL>   |  32<H>  |  0.76    Ca    8.0<L>      21 Feb 2018 18:52  Mg     2.8     02-21      Vital Signs Last 24 Hrs  T(C): 36.7 (22 Feb 2018 05:11), Max: 37.1 (21 Feb 2018 16:05)  T(F): 98 (22 Feb 2018 05:11), Max: 98.7 (21 Feb 2018 16:05)  HR: 114 (22 Feb 2018 05:11) (67 - 130)  BP: 101/68 (22 Feb 2018 05:11) (96/59 - 122/76)  BP(mean): --  RR: 21 (22 Feb 2018 05:11) (18 - 21)  SpO2: 99% (22 Feb 2018 05:11) (96% - 100%)    MEDICATIONS  (STANDING):  ALBUTerol/ipratropium for Nebulization 3 milliLiter(s) Nebulizer every 4 hours  artificial  tears Solution 1 Drop(s) Both EYES three times a day  aspirin  chewable 81 milliGRAM(s) Oral daily  atorvastatin 40 milliGRAM(s) Oral at bedtime  dextrose 5%. 1000 milliLiter(s) (70 mL/Hr) IV Continuous <Continuous>  heparin  Injectable 5000 Unit(s) SubCutaneous every 8 hours  metoprolol      tartrate Suspension 6.25 milliGRAM(s) Oral every 8 hours  piperacillin/tazobactam IVPB. 3.375 Gram(s) IV Intermittent every 6 hours    MEDICATIONS  (PRN):    Currently Undergoing Physical Therapy at bedside.    Initial Functional Status Assessment:    Previous Level of Function:     · Additional Comments	Unable to assess social hx or prior level of function 2/2 pt AMS and w/ increased lethargy. Pt arrives from [facility] rehab	    Cognitive Status Examination:   · Orientation	person; able to state that she is in a hospital but does not know the name, states that it is February 1918	  · Level of Consciousness	lethargic/somnolent; confused	  · Follows Commands and Answers Questions	50% of the time; able to follow single-step instructions	  · Personal Safety and Judgment	impaired	    Peripheral Neurovascular:     Neurovascular Assessment:   · LLE	warm; no discoloration; no tingling; no numbness	  · RLE	warm; no discoloration; no numbness; no tingling	    Range of Motion Exam:   · Range of Motion Examination	no ROM deficits were identified	    Manual Muscle Testing:   · Manual Muscle Testing Results	grossly at least 3+/5 2/2 ability to perform functional mob against gravity	    Bed Mobility: Rolling/Turning:     · Level of Rowan	maximum assist (25% patients effort)	  · Physical Assist/Nonphysical Assist	2 person assist; nonverbal cues (demo/gestures); verbal cues	    Bed Mobility: Scooting/Bridging:     · Level of Rowan	maximum assist (25% patients effort)	  · Physical Assist/Nonphysical Assist	2 person assist; nonverbal cues (demo/gestures); verbal cues	    Bed Mobility: Sit to Supine:     · Level of Rowan	maximum assist (25% patients effort)	  · Physical Assist/Nonphysical Assist	2 person assist; nonverbal cues (demo/gestures); verbal cues	    Bed Mobility: Supine to Sit:     · Level of Rowan	maximum assist (25% patients effort)	  · Physical Assist/Nonphysical Assist	2 person assist; nonverbal cues (demo/gestures); verbal cues	    Bed Mobility Analysis:     · Bed Mobility Limitations	decreased ability to use arms for pushing/pulling; decreased ability to use legs for bridging/pushing; impaired ability to control trunk for mobility	  · Impairments Contributing to Impaired Bed Mobility	impaired balance; cognition; impaired postural control; decreased strength	    Transfer: Sit to Stand:     · Level of Rowan	NA	    Transfer: Stand to Sit:     · Level of Rowan	NA	    Gait Skills:     · Level of Rowan	NA	    Stair Negotiation:     · Level of Rowan	NA	    Balance Skills Assessment:     · Sitting Balance: Static	fair balance	  · Sitting Balance: Dynamic	poor balance	    Clinical Impressions:   · Criteria for Skilled Therapeutic Interventions	impairments found; functional limitations in following categories; risk reduction/prevention; rehab potential; therapy frequency; anticipated discharge recommendation	  · Impairments Found (describe specific impairments)	aerobic capacity/endurance; cognitive impairment; arousal, attention, and cognition; fine motor; gait, locomotion, and balance; gross motor; muscle strength; poor safety awareness	  · Functional Limitations in Following Categories (describe specific limitations)	self-care; home management; community/leisure	  · Risk Reduction/Prevention (Describe Specific Areas of risk reduction/prevention)	risk factors	  · Risk Areas	fall; impaired judgment; safety awareness; cognitive impairment	  · Rehab Potential	fair, will monitor progress closely	  · Therapy Frequency	2-3x/week	      PM&R Impression: as above    Disposition Plan Recommendations: subacute rehab placement

## 2018-02-22 NOTE — PROGRESS NOTE ADULT - SUBJECTIVE AND OBJECTIVE BOX
CC/ HPI Patient is a 89 year old female with CAD (s/p 3-vessel CABG), MV repair, sCHF, HTN, admitted from rehab center where she was found to be altered, tachypneic and in respiratory distress, admitted to the CCU for CHF exacerbation complicated by hypoxic hypercapnic respiratory failure, atrial fibrillation with RVR, pneumonia, seen this morning without acute respiratory complaint    PAST MEDICAL & SURGICAL HISTORY:  Hyperlipidemia  Hypothyroid  Hypertension  Atherosclerosis  S/P CABG x 3    SOCHX:  -  alcohol    FMHX: FA/MO  - contributory     ROS reviewed below with positive findings marked (+) :  GEN:  fever, chills ENT: tracheostomy,   epistaxis,  sinusitis COR: CAD, CHF,  HTN, dysrhythmia PUL: COPD, ILD, asthma, pneumonia GI: PEG, dysphagia, hemorrhage, other KATELYN: kidney disease, electrolyte disorder HEM:  anemia, thrombus, coagulopathy, cancer ENDO:  thyroid disease, diabetes mellitus CNS:  dementia, stroke, seizure, PSY:  depression, anxiety, other      MEDICATIONS  (STANDING):  ALBUTerol/ipratropium for Nebulization 3 milliLiter(s) Nebulizer every 4 hours  artificial  tears Solution 1 Drop(s) Both EYES three times a day  aspirin  chewable 81 milliGRAM(s) Oral daily  atorvastatin 40 milliGRAM(s) Oral at bedtime  dextrose 5% + sodium chloride 0.45%. 1000 milliLiter(s) (50 mL/Hr) IV Continuous <Continuous>  heparin  Injectable 5000 Unit(s) SubCutaneous every 8 hours  metoprolol      tartrate Suspension 6.25 milliGRAM(s) Oral every 8 hours    MEDICATIONS  (PRN):      Vital Signs Last 24 Hrs  T(C): 36.7 (22 Feb 2018 10:15), Max: 37.1 (21 Feb 2018 16:05)  T(F): 98 (22 Feb 2018 10:15), Max: 98.7 (21 Feb 2018 16:05)  HR: 98 (22 Feb 2018 10:15) (67 - 130)  BP: 102/66 (22 Feb 2018 10:15) (96/59 - 122/76)  BP(mean): --  RR: 19 (22 Feb 2018 10:15) (18 - 21)  SpO2: 100% (22 Feb 2018 10:15) (96% - 100%)    GENERAL:         comfortable,  - distress.  HEENT:            - trauma,  - icterus,  - injection,  - nasal discharge.  NECK:              - jugular venous distention, - thyromegaly.  LYMPH:           - lymphadenopathy, - masses.  RESP:               + clear,   - rales,   - rhonchi,   - wheezes.   COR:                S1S2   - gallops,  - rubs.  ABD:                bowel sounds,   soft, - tender, - distended, - organomegaly.  EXT/MSC:         - cyanosis,  - clubbing,  - edema.    NEURO:             alert,   responds to stimuli.                          13.0   8.9   )-----------( 208      ( 21 Feb 2018 07:51 )             42.8         CT Angio Chest PE Protocol (02.18.18)  No acute or chronic pulmonary embolism.  2. Large bilateral pleural effusions. Atelectasis of the bilateral lower lobes. Patchy airspace opacities in the right upper lobe most likely representing pneumonia.      ASSESSMENT/PLAN    1) Congestive heart failure  2) Pneumonia/atelectasis  3) Pulmonary hypertension  4) Status post respiratory failure      Oxygen as needed for satisfactory SpO2 to avoid hypercapnia  NIPPV as needed  Bronchodilators:  Atrovent/ albuterol q 4 – 6 hours as needed  ID/Antibiotics: Zosyn  Cardiac/HTN: diuresis as needed  GI: Rx/ prophylaxis c PPI/H2B  Heme: Rx/VT prophylaxis  Discuss with medical team.

## 2018-02-22 NOTE — PROGRESS NOTE ADULT - ASSESSMENT
89 yo F with PMH CHF, HTN, HLD, hypothyroidism, osteoporosis PSHx triple bypass and mitral valve repair, referred from NH/rehab ([facility]). Admitted to CCU for acute on chronic heart failure, rate control Afib/Aflutter with variable block, respiratory failure 2/2 CHF + HAP. Currently on IV abx and aggressive diuresis. DNR/DNI. Consult for Palliative Care for symptom management and discussion of Goals of Care in the setting of acute on chronic heart failure decompensation with respiratory failure. Palliative Care consult placed for symptom management and GOC discussions in the setting of acute on chronic HF.

## 2018-02-22 NOTE — PROGRESS NOTE ADULT - ASSESSMENT
89F with a PMH of CAD (s/p 3-vessel CABG), MV repair, sCHF, HTN, HLD, hypothyroidism, and osteoporosis who presented from nursing/rehab center where she was found to be altered, tachypneic and in respiratory distress, admitted to the CCU for CHF exacerbation and hypoxic hypercapnic respiratory failure c/b HCAP and Afib RVR.

## 2018-02-22 NOTE — PROGRESS NOTE ADULT - PROBLEM SELECTOR PLAN 2
Multifactorial, sepsis from PNA + acute HF decompensation. Unclear if patient will return to baseline. Does not appear to be able to live independently. At minimum will need ZAY.

## 2018-02-23 LAB
ANION GAP SERPL CALC-SCNC: 8 MMOL/L — SIGNIFICANT CHANGE UP (ref 5–17)
BUN SERPL-MCNC: 20 MG/DL — SIGNIFICANT CHANGE UP (ref 7–23)
CALCIUM SERPL-MCNC: 8.3 MG/DL — LOW (ref 8.4–10.5)
CHLORIDE SERPL-SCNC: 105 MMOL/L — SIGNIFICANT CHANGE UP (ref 96–108)
CO2 SERPL-SCNC: 29 MMOL/L — SIGNIFICANT CHANGE UP (ref 22–31)
CREAT SERPL-MCNC: 0.65 MG/DL — SIGNIFICANT CHANGE UP (ref 0.5–1.3)
CULTURE RESULTS: SIGNIFICANT CHANGE UP
CULTURE RESULTS: SIGNIFICANT CHANGE UP
GLUCOSE SERPL-MCNC: 102 MG/DL — HIGH (ref 70–99)
HCT VFR BLD CALC: 39.4 % — SIGNIFICANT CHANGE UP (ref 34.5–45)
HGB BLD-MCNC: 11.9 G/DL — SIGNIFICANT CHANGE UP (ref 11.5–15.5)
MAGNESIUM SERPL-MCNC: 2.6 MG/DL — SIGNIFICANT CHANGE UP (ref 1.6–2.6)
MCHC RBC-ENTMCNC: 28.5 PG — SIGNIFICANT CHANGE UP (ref 27–34)
MCHC RBC-ENTMCNC: 30.2 G/DL — LOW (ref 32–36)
MCV RBC AUTO: 94.5 FL — SIGNIFICANT CHANGE UP (ref 80–100)
PHOSPHATE SERPL-MCNC: 2.5 MG/DL — SIGNIFICANT CHANGE UP (ref 2.5–4.5)
PLATELET # BLD AUTO: 198 K/UL — SIGNIFICANT CHANGE UP (ref 150–400)
POTASSIUM SERPL-MCNC: 4.8 MMOL/L — SIGNIFICANT CHANGE UP (ref 3.5–5.3)
POTASSIUM SERPL-SCNC: 4.8 MMOL/L — SIGNIFICANT CHANGE UP (ref 3.5–5.3)
RBC # BLD: 4.17 M/UL — SIGNIFICANT CHANGE UP (ref 3.8–5.2)
RBC # FLD: 15.6 % — SIGNIFICANT CHANGE UP (ref 10.3–16.9)
SODIUM SERPL-SCNC: 142 MMOL/L — SIGNIFICANT CHANGE UP (ref 135–145)
SPECIMEN SOURCE: SIGNIFICANT CHANGE UP
SPECIMEN SOURCE: SIGNIFICANT CHANGE UP
WBC # BLD: 8.3 K/UL — SIGNIFICANT CHANGE UP (ref 3.8–10.5)
WBC # FLD AUTO: 8.3 K/UL — SIGNIFICANT CHANGE UP (ref 3.8–10.5)

## 2018-02-23 RX ORDER — METOPROLOL TARTRATE 50 MG
12.5 TABLET ORAL
Qty: 0 | Refills: 0 | Status: DISCONTINUED | OUTPATIENT
Start: 2018-02-23 | End: 2018-02-23

## 2018-02-23 RX ORDER — METOPROLOL TARTRATE 50 MG
12.5 TABLET ORAL
Qty: 0 | Refills: 0 | Status: DISCONTINUED | OUTPATIENT
Start: 2018-02-23 | End: 2018-02-25

## 2018-02-23 RX ADMIN — Medication 1 TABLET(S): at 18:52

## 2018-02-23 RX ADMIN — Medication 3 MILLILITER(S): at 01:22

## 2018-02-23 RX ADMIN — Medication 1 DROP(S): at 05:21

## 2018-02-23 RX ADMIN — HEPARIN SODIUM 5000 UNIT(S): 5000 INJECTION INTRAVENOUS; SUBCUTANEOUS at 05:21

## 2018-02-23 RX ADMIN — HEPARIN SODIUM 5000 UNIT(S): 5000 INJECTION INTRAVENOUS; SUBCUTANEOUS at 21:19

## 2018-02-23 RX ADMIN — HEPARIN SODIUM 5000 UNIT(S): 5000 INJECTION INTRAVENOUS; SUBCUTANEOUS at 13:21

## 2018-02-23 RX ADMIN — Medication 3 MILLILITER(S): at 21:19

## 2018-02-23 RX ADMIN — Medication 3 MILLILITER(S): at 18:55

## 2018-02-23 RX ADMIN — Medication 1 DROP(S): at 21:19

## 2018-02-23 RX ADMIN — Medication 3 MILLILITER(S): at 05:21

## 2018-02-23 RX ADMIN — Medication 12.5 MILLIGRAM(S): at 18:56

## 2018-02-23 RX ADMIN — Medication 1 DROP(S): at 13:19

## 2018-02-23 RX ADMIN — ATORVASTATIN CALCIUM 40 MILLIGRAM(S): 80 TABLET, FILM COATED ORAL at 21:19

## 2018-02-23 RX ADMIN — SODIUM CHLORIDE 50 MILLILITER(S): 9 INJECTION, SOLUTION INTRAVENOUS at 04:30

## 2018-02-23 RX ADMIN — Medication 81 MILLIGRAM(S): at 13:19

## 2018-02-23 RX ADMIN — Medication 6.25 MILLIGRAM(S): at 01:23

## 2018-02-23 RX ADMIN — Medication 3 MILLILITER(S): at 13:18

## 2018-02-23 RX ADMIN — Medication 12.5 MILLIGRAM(S): at 13:19

## 2018-02-23 NOTE — SWALLOW BEDSIDE ASSESSMENT ADULT - SLP PERTINENT HISTORY OF CURRENT PROBLEM
89 y/o F admitted for AMS and respiratory distress. Pt w/ suspected PNA on admission. CT chest shows patchy airspace opacities in the right upper lobe.  (+) being followed palliative

## 2018-02-23 NOTE — SWALLOW BEDSIDE ASSESSMENT ADULT - SWALLOW EVAL: RECOMMENDED FEEDING/EATING TECHNIQUES
crush medication (when feasible)/maintain upright posture during/after eating for 30 mins/check mouth frequently for oral residue/pocketing/position upright (90 degrees)/alternate food with liquid/oral hygiene/small sips/bites

## 2018-02-23 NOTE — PROGRESS NOTE ADULT - SUBJECTIVE AND OBJECTIVE BOX
OVERNIGHT EVENTS: JESÚS    SUBJECTIVE: Patient seen and examined at bedside. Reports feeling tired but well overall. States she is breathing comfortably on room air. Reports feeling hungry. No additional complaints.     Vital Signs Last 12 Hrs  T(F): 97.2 (02-23-18 @ 05:23), Max: 97.2 (02-23-18 @ 05:23)  HR: 106 (02-23-18 @ 08:50) (106 - 119)  BP: 147/79 (02-23-18 @ 05:23) (100/64 - 147/79)  BP(mean): --  RR: 16 (02-23-18 @ 05:23) (16 - 22)  SpO2: 99% (02-23-18 @ 08:50) (98% - 100%)  I&O's Summary    22 Feb 2018 07:01  -  23 Feb 2018 07:00  --------------------------------------------------------  IN: 700 mL / OUT: 350 mL / NET: 350 mL    PHYSICAL EXAM:  Constitutional: NAD, comfortable in bed.  HEENT: NC/AT, PERRLA, EOMI, no conjunctival pallor or scleral icterus, dry MM  Neck: Supple, no JVD  Respiratory: Breathing comfortably on NC. Normal rate, rhythm, depth, effort. CTAB.   Cardiovascular: RRR, normal S1 and S2, no m/r/g.   Gastrointestinal: +BS, soft NTND, no guarding or rebound tenderness, no palpable masses   Extremities: wwp; no cyanosis, clubbing or edema.   Vascular: Pulses equal and strong throughout.   Neurological: AAOx1, no CN deficits, strength and sensation intact throughout.   Skin: No gross skin abnormalities or rashes    LABS:                        11.9   8.3   )-----------( 198      ( 23 Feb 2018 07:48 )             39.4     02-23    142  |  105  |  20  ----------------------------<  102<H>  4.8   |  29  |  0.65    Ca    8.3<L>      23 Feb 2018 07:48  Phos  2.5     02-23  Mg     2.6     02-23            RADIOLOGY & ADDITIONAL TESTS:    MEDICATIONS  (STANDING):  ALBUTerol/ipratropium for Nebulization 3 milliLiter(s) Nebulizer every 4 hours  artificial  tears Solution 1 Drop(s) Both EYES three times a day  aspirin  chewable 81 milliGRAM(s) Oral daily  atorvastatin 40 milliGRAM(s) Oral at bedtime  dextrose 5% + sodium chloride 0.45%. 1000 milliLiter(s) (50 mL/Hr) IV Continuous <Continuous>  heparin  Injectable 5000 Unit(s) SubCutaneous every 8 hours  metoprolol      tartrate Suspension 6.25 milliGRAM(s) Oral every 8 hours    MEDICATIONS  (PRN):

## 2018-02-23 NOTE — SWALLOW BEDSIDE ASSESSMENT ADULT - NS SPL SWALLOW CLINIC TRIAL FT
Pt rec'd thin tspx1, thin cupx3, thin strawx6, pureex5, soft solidx4 and dry solidx2. occasional anterior loss of thin liquid via cup (Left side only). Pt w/ (+) sensation to spillage. delayed but functional manipulation and incomplete bolus formation of dry and soft solids. AP transit appears timely across consistencies. min to mod oral residue clears w/ cues to use thin liquid swish. Pt w/o sensation to residue from solids on left side. pharyngeal swallow suspected to be mildly delayed however hyolaryngeal elevation judged adequate upon palpation across textures. no overt clinical s/s of penetration/aspiration. Pt is being followed by palliative, RN notes NO feeding tubes. Proceed w. chopped solids and thin liquids as tolerated. Pt expressed fair understanding of findings and increased aspiration risk w/continuation of  PO diet. Follow up PRN Pt rec'd thin tspx1, thin cupx3, thin strawx6, pureex5, soft solidx4 and dry solidx2. occasional anterior loss of thin liquid via cup (Left side only). Pt w/ (+) sensation to spillage. delayed but functional manipulation and incomplete bolus formation of dry and soft solids. AP transit appears timely across consistencies. min to mod oral residue clears w/ cues to use thin liquid swish. Pt w/o sensation to residue from solids on left side. pharyngeal swallow suspected to be mildly delayed however hyolaryngeal elevation judged adequate upon palpation across textures. no overt clinical s/s of penetration/aspiration. Pt is being followed by palliative, RN notes NO feeding tubes. Proceed w. chopped solids and thin liquids as tolerated. Pt expressed fair understanding of findings and increased aspiration risk w/continuation of  PO diet. Intervention no longer warranted. Follow up PRN

## 2018-02-23 NOTE — PROGRESS NOTE ADULT - PROBLEM SELECTOR PLAN 9
F: no IVF  E: K>4 Mg>2, monitor and replete as needed  N: S&S eval to advance diet  Ppx: SQH  D: RMF  DNR/DNI: per discussion with pt son, no invasive measures like A-lines or central lines or procedures, but ok with IV abx & diuresis - see chart note for full details

## 2018-02-23 NOTE — PROGRESS NOTE ADULT - PROBLEM SELECTOR PLAN 4
Pt meeting sepsis criteria on admission (tachypnea, tachycardia and suspected PNA on CT chest).   -CT chest with patchy airspace opacities in the right upper lobe representing pneumonia.  RVP negative.  -DC Vanc  -DC Zosyn completed 5 day course  -

## 2018-02-23 NOTE — PROGRESS NOTE ADULT - PROBLEM SELECTOR PLAN 1
CTA PE protocol showed cardiomegaly and post MVR, right cardiac chamber  enlargement suggesting right-sided congestive heart failure.    -Echo demonstrating EF 50-55%, gross dilation  -Diuresed with PRN IV Lasix, will continue as needed   -Monitor I/O   -increased metoprolol tartrate to 12.5 BID  -will restart ACE-I/ARB as tolerated    #Pulmonary Hypertension  Likely class 2, no reported history of primary pulmonary disease   -70mmHg  -maintain euvolemic

## 2018-02-23 NOTE — PROGRESS NOTE ADULT - PROBLEM SELECTOR PLAN 3
Chronic. however risk of anticoagulation outweighs benefits given pt's age and functional status  -[id number] 5, family did not want anticoagulation  -Lopressor for rate control, increased to 12.5 BID

## 2018-02-23 NOTE — PROGRESS NOTE ADULT - SUBJECTIVE AND OBJECTIVE BOX
CC/ HPI Patient is a 89 year old female with CAD (s/p 3-vessel CABG), MV repair, sCHF, HTN, admitted from rehab center where she was found to be altered, tachypneic and in respiratory distress, admitted to the CCU for CHF exacerbation complicated by hypoxic hypercapnic respiratory failure, atrial fibrillation with RVR, pneumonia, seen this morning without acute respiratory complaint    PAST MEDICAL & SURGICAL HISTORY:  Hyperlipidemia  Hypothyroid  Hypertension  Atherosclerosis  S/P CABG x 3    SOCHX:  -  alcohol    FMHX: FA/MO  - contributory     ROS reviewed below with positive findings marked (+) :  GEN:  fever, chills ENT: tracheostomy,   epistaxis,  sinusitis COR: CAD, CHF,  HTN, dysrhythmia PUL: COPD, ILD, asthma, pneumonia GI: PEG, dysphagia, hemorrhage, other KATELYN: kidney disease, electrolyte disorder HEM:  anemia, thrombus, coagulopathy, cancer ENDO:  thyroid disease, diabetes mellitus CNS:  dementia, stroke, seizure, PSY:  depression, anxiety, other      MEDICATIONS  (STANDING):  ALBUTerol/ipratropium for Nebulization 3 milliLiter(s) Nebulizer every 4 hours  amoxicillin  875 milliGRAM(s)/clavulanate 1 Tablet(s) Oral two times a day  artificial  tears Solution 1 Drop(s) Both EYES three times a day  aspirin  chewable 81 milliGRAM(s) Oral daily  atorvastatin 40 milliGRAM(s) Oral at bedtime  heparin  Injectable 5000 Unit(s) SubCutaneous every 8 hours  metoprolol     tartrate 12.5 milliGRAM(s) Oral two times a day    MEDICATIONS  (PRN):      Vital Signs Last 24 Hrs  T(C): 36.2 (23 Feb 2018 05:23), Max: 36.8 (22 Feb 2018 21:24)  T(F): 97.2 (23 Feb 2018 05:23), Max: 98.2 (22 Feb 2018 21:24)  HR: 106 (23 Feb 2018 08:50) (89 - 119)  BP: 147/79 (23 Feb 2018 05:23) (100/64 - 147/79)  BP(mean): --  RR: 16 (23 Feb 2018 05:23) (16 - 22)  SpO2: 99% (23 Feb 2018 08:50) (96% - 100%)    GENERAL:         comfortable,  - distress.  HEENT:            - trauma,  - icterus,  - injection,  - nasal discharge.  NECK:              - jugular venous distention, - thyromegaly.  LYMPH:           - lymphadenopathy, - masses.  RESP:               + crackles,   - rhonchi,   - wheezes.   COR:                S1S2   - gallops,  - rubs.  ABD:                bowel sounds,   soft, - tender, - distended, - organomegaly.  EXT/MSC:         - cyanosis,  - clubbing,  - edema.    NEURO:             alert,   responds to stimuli.                          11.9   8.3   )-----------( 198      ( 23 Feb 2018 07:48 )             39.4     02-23    142  |  105  |  20  ----------------------------<  102<H>  4.8   |  29  |  0.65    Ca    8.3<L>      23 Feb 2018 07:48  Phos  2.5     02-23  Mg     2.6     02-23              CT Angio Chest PE Protocol (02.18.18)  No acute or chronic pulmonary embolism.  2. Large bilateral pleural effusions. Atelectasis of the bilateral lower lobes. Patchy airspace opacities in the right upper lobe most likely representing pneumonia.      ASSESSMENT/PLAN    1) Congestive heart failure  2) Pneumonia/atelectasis  3) Pulmonary hypertension  4) Status post respiratory failure      Oxygen as needed for satisfactory SpO2 to avoid hypercapnia  Bronchodilators:  Atrovent/ albuterol q 4 – 6 hours as needed  ID/Antibiotics: status post Zosyn  Cardiac/HTN: diuresis as needed  GI: Rx/ prophylaxis c PPI/H2B  Heme: Rx/VT prophylaxis  Discuss with medical team.

## 2018-02-23 NOTE — PROGRESS NOTE ADULT - SUBJECTIVE AND OBJECTIVE BOX
OVERNIGHT EVENTS: JESÚS    SUBJECTIVE: no sob/CP    Vital Signs Last 12 Hrs  T(F): 97.2 (02-23-18 @ 05:23), Max: 97.2 (02-23-18 @ 05:23)  HR: 106 (02-23-18 @ 08:50) (106 - 119)  BP: 147/79 (02-23-18 @ 05:23) (100/64 - 147/79)  BP(mean): --  RR: 16 (02-23-18 @ 05:23) (16 - 22)  SpO2: 99% (02-23-18 @ 08:50) (98% - 100%)  I&O's Summary    22 Feb 2018 07:01  -  23 Feb 2018 07:00  --------------------------------------------------------  IN: 700 mL / OUT: 350 mL / NET: 350 mL    PHYSICAL EXAM:  Constitutional: NAD, comfortable in bed.  HEENT: NC/AT, PERRLA, EOMI, no conjunctival pallor or scleral icterus, dry MM  Neck: Supple, no JVD  Respiratory: Breathing comfortably on NC. Normal rate, rhythm, depth, effort. CTAB.   Cardiovascular: RRR, normal S1 and S2, no m/r/g.   Gastrointestinal: +BS, soft NTND, no guarding or rebound tenderness, no palpable masses   Extremities: wwp; no cyanosis, clubbing or edema.   Vascular: Pulses equal and strong throughout.   Neurological: AAOx1, no CN deficits, strength and sensation intact throughout.   Skin: No gross skin abnormalities or rashes    LABS:                        11.9   8.3   )-----------( 198      ( 23 Feb 2018 07:48 )             39.4     02-23    142  |  105  |  20  ----------------------------<  102<H>  4.8   |  29  |  0.65    Ca    8.3<L>      23 Feb 2018 07:48  Phos  2.5     02-23  Mg     2.6     02-23            RADIOLOGY & ADDITIONAL TESTS:    MEDICATIONS  (STANDING):  ALBUTerol/ipratropium for Nebulization 3 milliLiter(s) Nebulizer every 4 hours  artificial  tears Solution 1 Drop(s) Both EYES three times a day  aspirin  chewable 81 milliGRAM(s) Oral daily  atorvastatin 40 milliGRAM(s) Oral at bedtime  dextrose 5% + sodium chloride 0.45%. 1000 milliLiter(s) (50 mL/Hr) IV Continuous <Continuous>  heparin  Injectable 5000 Unit(s) SubCutaneous every 8 hours  metoprolol      tartrate Suspension 6.25 milliGRAM(s) Oral every 8 hours    MEDICATIONS  (PRN):

## 2018-02-23 NOTE — PROGRESS NOTE ADULT - PROBLEM SELECTOR PLAN 3
Chronic. however risk of anticoagulation outweighs benefits given pt's age and functional status  -[id number] 5, family did not want anticoagulation  -Lopressor for rate control, increased to 12.5 BID

## 2018-02-23 NOTE — PROGRESS NOTE ADULT - PROBLEM SELECTOR PLAN 5
w/ history of 3 vessel CABG  -no evidence of ACS at this time  -lopressor 12.5 BID  -holding ACEI  -c/w 81mg ASA

## 2018-02-24 LAB
ANION GAP SERPL CALC-SCNC: 8 MMOL/L — SIGNIFICANT CHANGE UP (ref 5–17)
BUN SERPL-MCNC: 21 MG/DL — SIGNIFICANT CHANGE UP (ref 7–23)
CALCIUM SERPL-MCNC: 8.9 MG/DL — SIGNIFICANT CHANGE UP (ref 8.4–10.5)
CHLORIDE SERPL-SCNC: 103 MMOL/L — SIGNIFICANT CHANGE UP (ref 96–108)
CO2 SERPL-SCNC: 29 MMOL/L — SIGNIFICANT CHANGE UP (ref 22–31)
CREAT SERPL-MCNC: 0.65 MG/DL — SIGNIFICANT CHANGE UP (ref 0.5–1.3)
GLUCOSE SERPL-MCNC: 87 MG/DL — SIGNIFICANT CHANGE UP (ref 70–99)
HCT VFR BLD CALC: 41.7 % — SIGNIFICANT CHANGE UP (ref 34.5–45)
HGB BLD-MCNC: 12.8 G/DL — SIGNIFICANT CHANGE UP (ref 11.5–15.5)
MAGNESIUM SERPL-MCNC: 2.6 MG/DL — SIGNIFICANT CHANGE UP (ref 1.6–2.6)
MCHC RBC-ENTMCNC: 28.4 PG — SIGNIFICANT CHANGE UP (ref 27–34)
MCHC RBC-ENTMCNC: 30.7 G/DL — LOW (ref 32–36)
MCV RBC AUTO: 92.7 FL — SIGNIFICANT CHANGE UP (ref 80–100)
PHOSPHATE SERPL-MCNC: 3 MG/DL — SIGNIFICANT CHANGE UP (ref 2.5–4.5)
PLATELET # BLD AUTO: 222 K/UL — SIGNIFICANT CHANGE UP (ref 150–400)
POTASSIUM SERPL-MCNC: 4.9 MMOL/L — SIGNIFICANT CHANGE UP (ref 3.5–5.3)
POTASSIUM SERPL-SCNC: 4.9 MMOL/L — SIGNIFICANT CHANGE UP (ref 3.5–5.3)
RBC # BLD: 4.5 M/UL — SIGNIFICANT CHANGE UP (ref 3.8–5.2)
RBC # FLD: 15.8 % — SIGNIFICANT CHANGE UP (ref 10.3–16.9)
SODIUM SERPL-SCNC: 140 MMOL/L — SIGNIFICANT CHANGE UP (ref 135–145)
WBC # BLD: 10.2 K/UL — SIGNIFICANT CHANGE UP (ref 3.8–10.5)
WBC # FLD AUTO: 10.2 K/UL — SIGNIFICANT CHANGE UP (ref 3.8–10.5)

## 2018-02-24 RX ADMIN — Medication 3 MILLILITER(S): at 05:46

## 2018-02-24 RX ADMIN — Medication 81 MILLIGRAM(S): at 11:22

## 2018-02-24 RX ADMIN — Medication 1 TABLET(S): at 06:11

## 2018-02-24 RX ADMIN — Medication 3 MILLILITER(S): at 01:36

## 2018-02-24 RX ADMIN — HEPARIN SODIUM 5000 UNIT(S): 5000 INJECTION INTRAVENOUS; SUBCUTANEOUS at 21:40

## 2018-02-24 RX ADMIN — HEPARIN SODIUM 5000 UNIT(S): 5000 INJECTION INTRAVENOUS; SUBCUTANEOUS at 13:20

## 2018-02-24 RX ADMIN — Medication 3 MILLILITER(S): at 11:22

## 2018-02-24 RX ADMIN — HEPARIN SODIUM 5000 UNIT(S): 5000 INJECTION INTRAVENOUS; SUBCUTANEOUS at 05:47

## 2018-02-24 RX ADMIN — Medication 3 MILLILITER(S): at 21:38

## 2018-02-24 RX ADMIN — Medication 1 DROP(S): at 05:46

## 2018-02-24 RX ADMIN — Medication 3 MILLILITER(S): at 16:22

## 2018-02-24 RX ADMIN — Medication 1 DROP(S): at 13:20

## 2018-02-24 RX ADMIN — ATORVASTATIN CALCIUM 40 MILLIGRAM(S): 80 TABLET, FILM COATED ORAL at 21:39

## 2018-02-24 RX ADMIN — Medication 1 DROP(S): at 21:39

## 2018-02-24 RX ADMIN — Medication 1 TABLET(S): at 17:16

## 2018-02-24 RX ADMIN — Medication 12.5 MILLIGRAM(S): at 17:18

## 2018-02-24 RX ADMIN — Medication 12.5 MILLIGRAM(S): at 05:49

## 2018-02-24 NOTE — PROGRESS NOTE ADULT - SUBJECTIVE AND OBJECTIVE BOX
SUBJECTIVE: no sob/CP      T(F): 97.2 (02-23-18 @ 05:23), Max: 97.2 (02-23-18 @ 05:23)  HR: 106 (02-23-18 @ 08:50) (106 - 119)  BP: 147/79 (02-23-18 @ 05:23) (100/64 - 147/79)  BP(mean): --  RR: 16 (02-23-18 @ 05:23) (16 - 22)  SpO2: 99% (02-23-18 @ 08:50) (98% - 100%)  I&O's Summary    22 Feb 2018 07:01  -  23 Feb 2018 07:00  --------------------------------------------------------  IN: 700 mL / OUT: 350 mL / NET: 350 mL    PHYSICAL EXAM:  Constitutional: NAD, comfortable in bed.  HEENT: NC/AT, PERRLA, EOMI, no conjunctival pallor or scleral icterus, dry MM  Neck: Supple, no JVD  Respiratory: Breathing comfortably on NC. Normal rate, rhythm, depth, effort. CTAB.   Cardiovascular: RRR, normal S1 and S2, no m/r/g.   Gastrointestinal: +BS, soft NTND, no guarding or rebound tenderness, no palpable masses   Extremities: wwp; no cyanosis, clubbing or edema.   Vascular: Pulses equal and strong throughout.   Neurological: AAOx1, no CN deficits, strength and sensation intact throughout.   Skin: No gross skin abnormalities or rashes    LABS:                        11.9   8.3   )-----------( 198      ( 23 Feb 2018 07:48 )             39.4     02-23    142  |  105  |  20  ----------------------------<  102<H>  4.8   |  29  |  0.65    Ca    8.3<L>      23 Feb 2018 07:48  Phos  2.5     02-23  Mg     2.6     02-23            RADIOLOGY & ADDITIONAL TESTS:    MEDICATIONS  (STANDING):  ALBUTerol/ipratropium for Nebulization 3 milliLiter(s) Nebulizer every 4 hours  artificial  tears Solution 1 Drop(s) Both EYES three times a day  aspirin  chewable 81 milliGRAM(s) Oral daily  atorvastatin 40 milliGRAM(s) Oral at bedtime  dextrose 5% + sodium chloride 0.45%. 1000 milliLiter(s) (50 mL/Hr) IV Continuous <Continuous>  heparin  Injectable 5000 Unit(s) SubCutaneous every 8 hours  metoprolol      tartrate Suspension 6.25 milliGRAM(s) Oral every 8 hours    MEDICATIONS  (PRN):

## 2018-02-24 NOTE — PROGRESS NOTE ADULT - NSHPATTENDINGPLANDISCUSS_GEN_ALL_CORE
the patient and ccu care team
cardio
[facility] Social Work
Primary Team, Social Work
cardio
ccu care team

## 2018-02-24 NOTE — PROGRESS NOTE ADULT - SUBJECTIVE AND OBJECTIVE BOX
Interventional, Pulmonary, Critical, Chest Special Procedures.    Pt was seen and fully examined by myself.     Time spent with patient in minutes:37    Patient is a 89y old  Female who presents with a chief complaint of sob. The patient ill appearing, eupneioc on NC when seen pain free and engaging in simple conversation.   HPI:  89 yo F with PMH CHF, HTN, HLD, hypothyroidism, osteoporosis PSHx triple bypass and mitral valve repair, presented from nursing/rehab center where she was found to be altered, tachypneic and in respiratory distress. In ED she was noted to be altered, tachypneic and tachycardic so she was placed on BIPAP. Vitals -126, /82, RR 17-27, SpO2 94% on BIPAP. CT head was negative for any acute pathology. EKG showed Aflutter with variable block, LAD, LVH with repolarization abnormality Labs were remarkable for Trop 0.02, pro-BNP 4552. VBG showed respiratory acidosis pH 7.23, pCO2 80, HCO3 33.  Bedside echo performed by cardio fellow showed EF 35-40%, LVH and hypokinetic RV. Chest X ray showed congestion and/or infiltrates with bilateral effusions. CTA PE protocol was ordered given hypoxia and signs of right heart failure. CTA was negative for PE, showed large bilateral pleural effusions and atelectasis of the bilateral lower lobes with patchy airspace opacities in the right upper lobe most likely representing pneumonia. Pt received 40 mg IV lasix and was transfered to CCU for further management. Upon arrival in the unit, the patient remained on BIPAP and altered, minimally responsive. (18 Feb 2018 16:13)    REVIEW OF SYSTEMS:  ROS reviewed below with positive findings marked (+) :  GEN:  fever, chills ENT: tracheostomy,   epistaxis,  sinusitis COR: CAD, CHF,  HTN, dysrhythmia PUL: COPD, ILD, asthma, pneumonia GI: PEG, dysphagia, hemorrhage, other KATELYN: kidney disease, electrolyte disorder HEM:  anemia, thrombus, coagulopathy, cancer ENDO:  thyroid disease, diabetes mellitus CNS:  dementia, stroke, seizure, PSY:  depression, anxiety, other  PAST MEDICAL & SURGICAL HISTORY:  Hyperlipidemia  Hypothyroid  Hypertension  Atherosclerosis  S/P CABG x 3    FAMILY HISTORY:  No significant family history    SOCIAL HISTORY:      - Tobacco     - ETOH    Allergies    aminocaproic acid (Unknown)    Intolerances      Vital Signs Last 24 Hrs  T(C): 36.3 (24 Feb 2018 08:49), Max: 36.7 (23 Feb 2018 21:20)  T(F): 97.4 (24 Feb 2018 08:49), Max: 98 (23 Feb 2018 21:20)  HR: 105 (24 Feb 2018 08:49) (105 - 110)  BP: 145/82 (24 Feb 2018 08:49) (145/82 - 157/87)  BP(mean): 78 (23 Feb 2018 21:20) (78 - 78)  RR: 18 (24 Feb 2018 08:49) (18 - 20)  SpO2: 95% (24 Feb 2018 08:49) (95% - 100%)    02-23 @ 07:01  -  02-24 @ 07:00  --------------------------------------------------------  IN: 0 mL / OUT: 650 mL / NET: -650 mL        PHYSICAL EXAM:  GENERAL:         comfortable,  - distress.  HEENT:            - trauma,  - icterus,  - injection,  - nasal discharge.  NECK:              - jugular venous distention, - thyromegaly.  LYMPH:           - lymphadenopathy, - masses.  RESP:               + crackles,   - rhonchi,   - wheezes.   COR:                S1S2   - gallops,  - rubs.  ABD:                bowel sounds,   soft, - tender, - distended, - organomegaly.  EXT/MSC:         - cyanosis,  - clubbing,  - edema.    NEURO:             alert,   responds to stimuli.    DEVICES:  - DENTURES   +IV R / L     - ETUBE   -TRACH   -CTUBE  R / L      LABS:                          12.8   10.2  )-----------( 222      ( 24 Feb 2018 07:49 )             41.7     02-24    140  |  103  |  21  ----------------------------<  87  4.9   |  29  |  0.65    Ca    8.9      24 Feb 2018 07:49  Phos  3.0     02-24  Mg     2.6     02-24        RADIOLOGY & ADDITIONAL STUDIES (The following images were personally reviewed):cxr

## 2018-02-24 NOTE — PROGRESS NOTE ADULT - PROBLEM SELECTOR PLAN 3
Chronic. however risk of anticoagulation outweighs benefits given pt's age and functional status  -[id number] 5, family did not want anticoagulation  -Lopressor for rate control, increased to 12.5 BID

## 2018-02-25 LAB
ANION GAP SERPL CALC-SCNC: 10 MMOL/L — SIGNIFICANT CHANGE UP (ref 5–17)
BUN SERPL-MCNC: 25 MG/DL — HIGH (ref 7–23)
CALCIUM SERPL-MCNC: 8.8 MG/DL — SIGNIFICANT CHANGE UP (ref 8.4–10.5)
CHLORIDE SERPL-SCNC: 107 MMOL/L — SIGNIFICANT CHANGE UP (ref 96–108)
CO2 SERPL-SCNC: 27 MMOL/L — SIGNIFICANT CHANGE UP (ref 22–31)
CREAT SERPL-MCNC: 0.8 MG/DL — SIGNIFICANT CHANGE UP (ref 0.5–1.3)
GLUCOSE SERPL-MCNC: 103 MG/DL — HIGH (ref 70–99)
HCT VFR BLD CALC: 38.5 % — SIGNIFICANT CHANGE UP (ref 34.5–45)
HGB BLD-MCNC: 11.9 G/DL — SIGNIFICANT CHANGE UP (ref 11.5–15.5)
MAGNESIUM SERPL-MCNC: 2.7 MG/DL — HIGH (ref 1.6–2.6)
MCHC RBC-ENTMCNC: 28.5 PG — SIGNIFICANT CHANGE UP (ref 27–34)
MCHC RBC-ENTMCNC: 30.9 G/DL — LOW (ref 32–36)
MCV RBC AUTO: 92.1 FL — SIGNIFICANT CHANGE UP (ref 80–100)
PLATELET # BLD AUTO: 196 K/UL — SIGNIFICANT CHANGE UP (ref 150–400)
POTASSIUM SERPL-MCNC: 5 MMOL/L — SIGNIFICANT CHANGE UP (ref 3.5–5.3)
POTASSIUM SERPL-SCNC: 5 MMOL/L — SIGNIFICANT CHANGE UP (ref 3.5–5.3)
RBC # BLD: 4.18 M/UL — SIGNIFICANT CHANGE UP (ref 3.8–5.2)
RBC # FLD: 16 % — SIGNIFICANT CHANGE UP (ref 10.3–16.9)
SODIUM SERPL-SCNC: 144 MMOL/L — SIGNIFICANT CHANGE UP (ref 135–145)
WBC # BLD: 8.1 K/UL — SIGNIFICANT CHANGE UP (ref 3.8–10.5)
WBC # FLD AUTO: 8.1 K/UL — SIGNIFICANT CHANGE UP (ref 3.8–10.5)

## 2018-02-25 RX ORDER — METOPROLOL TARTRATE 50 MG
6.25 TABLET ORAL ONCE
Qty: 0 | Refills: 0 | Status: COMPLETED | OUTPATIENT
Start: 2018-02-25 | End: 2018-02-25

## 2018-02-25 RX ORDER — METOPROLOL TARTRATE 50 MG
6.25 TABLET ORAL ONCE
Qty: 0 | Refills: 0 | Status: DISCONTINUED | OUTPATIENT
Start: 2018-02-25 | End: 2018-02-25

## 2018-02-25 RX ORDER — METOPROLOL TARTRATE 50 MG
18.75 TABLET ORAL
Qty: 0 | Refills: 0 | Status: DISCONTINUED | OUTPATIENT
Start: 2018-02-26 | End: 2018-02-26

## 2018-02-25 RX ADMIN — HEPARIN SODIUM 5000 UNIT(S): 5000 INJECTION INTRAVENOUS; SUBCUTANEOUS at 21:14

## 2018-02-25 RX ADMIN — Medication 3 MILLILITER(S): at 21:13

## 2018-02-25 RX ADMIN — HEPARIN SODIUM 5000 UNIT(S): 5000 INJECTION INTRAVENOUS; SUBCUTANEOUS at 12:59

## 2018-02-25 RX ADMIN — Medication 81 MILLIGRAM(S): at 11:24

## 2018-02-25 RX ADMIN — Medication 6.25 MILLIGRAM(S): at 19:19

## 2018-02-25 RX ADMIN — Medication 3 MILLILITER(S): at 05:22

## 2018-02-25 RX ADMIN — HEPARIN SODIUM 5000 UNIT(S): 5000 INJECTION INTRAVENOUS; SUBCUTANEOUS at 05:26

## 2018-02-25 RX ADMIN — Medication 1 DROP(S): at 21:14

## 2018-02-25 RX ADMIN — Medication 12.5 MILLIGRAM(S): at 17:53

## 2018-02-25 RX ADMIN — Medication 1 TABLET(S): at 05:24

## 2018-02-25 RX ADMIN — ATORVASTATIN CALCIUM 40 MILLIGRAM(S): 80 TABLET, FILM COATED ORAL at 21:14

## 2018-02-25 RX ADMIN — Medication 3 MILLILITER(S): at 02:38

## 2018-02-25 RX ADMIN — Medication 12.5 MILLIGRAM(S): at 05:26

## 2018-02-25 RX ADMIN — Medication 3 MILLILITER(S): at 09:46

## 2018-02-25 RX ADMIN — Medication 3 MILLILITER(S): at 17:53

## 2018-02-25 RX ADMIN — Medication 1 DROP(S): at 05:25

## 2018-02-25 RX ADMIN — Medication 1 DROP(S): at 12:59

## 2018-02-25 RX ADMIN — Medication 3 MILLILITER(S): at 12:58

## 2018-02-25 NOTE — PROGRESS NOTE ADULT - PROBLEM SELECTOR PLAN 1
CTA PE protocol showed cardiomegaly and post MVR, right cardiac chamber  enlargement suggesting right-sided congestive heart failure.    -Echo demonstrating EF 50-55%, gross dilation  -Diuresed with PRN IV Lasix, will continue as needed   -Monitor I/O   -c/w metoprolol tartrate 12.5 BID, continue to monitor HR  -will restart ACE-I/ARB as tolerated    #Pulmonary Hypertension  Likely class 2, no reported history of primary pulmonary disease   -70mmHg  -maintain euvolemic

## 2018-02-25 NOTE — PROGRESS NOTE ADULT - SUBJECTIVE AND OBJECTIVE BOX
OVERNIGHT EVENTS:  none    SUBJECTIVE:  Comfortable     Vital Signs Last 12 Hrs  T(F): 97.4 (02-25-18 @ 08:34), Max: 98.5 (02-25-18 @ 06:02)  HR: 114 (02-25-18 @ 08:34) (111 - 152)  BP: 126/79 (02-25-18 @ 08:34) (115/66 - 126/79)  BP(mean): --  RR: 17 (02-25-18 @ 08:34) (17 - 19)  SpO2: 99% (02-25-18 @ 08:34) (99% - 100%)  I&O's Summary    24 Feb 2018 07:01  -  25 Feb 2018 07:00  --------------------------------------------------------  IN: 0 mL / OUT: 200 mL / NET: -200 mL    PHYSICAL EXAM:  Constitutional: NAD, comfortable in bed.  HEENT: NC/AT, PERRLA, EOMI, no conjunctival pallor or scleral icterus, dry MM  Neck: Supple, no JVD  Respiratory: Breathing comfortably on NC. Normal rate, rhythm, depth, effort. CTAB.   Cardiovascular: RRR, normal S1 and S2, no m/r/g.   Gastrointestinal: +BS, soft NTND, no guarding or rebound tenderness, no palpable masses   Extremities: wwp; no cyanosis, clubbing or edema.   Vascular: Pulses equal and strong throughout.   Neurological: AAOx1, no CN deficits, strength and sensation intact throughout.   Skin: No gross skin abnormalities or rashes    LABS:                        11.9   8.1   )-----------( 196      ( 25 Feb 2018 07:09 )             38.5     02-25    144  |  107  |  25<H>  ----------------------------<  103<H>  5.0   |  27  |  0.80    Ca    8.8      25 Feb 2018 07:09  Phos  3.0     02-24  Mg     2.7     02-25    RADIOLOGY & ADDITIONAL TESTS:    MEDICATIONS  (STANDING):  ALBUTerol/ipratropium for Nebulization 3 milliLiter(s) Nebulizer every 4 hours  amoxicillin  875 milliGRAM(s)/clavulanate 1 Tablet(s) Oral two times a day  artificial  tears Solution 1 Drop(s) Both EYES three times a day  aspirin  chewable 81 milliGRAM(s) Oral daily  atorvastatin 40 milliGRAM(s) Oral at bedtime  heparin  Injectable 5000 Unit(s) SubCutaneous every 8 hours  metoprolol     tartrate 12.5 milliGRAM(s) Oral two times a day    MEDICATIONS  (PRN):  < from: Xray Chest 1 View- PORTABLE-Routine (02.20.18 @ 07:03) >    Indication: CHF, PNA    Bedside examination of the chest dated 2/20/2018 7:03 AM is compared to   the previous study of 2/19/2018.    Findings: No change bilateral infiltrates and pleural effusions.    Impression: No change.        < end of copied text >

## 2018-02-25 NOTE — PROGRESS NOTE ADULT - SUBJECTIVE AND OBJECTIVE BOX
OVERNIGHT EVENTS: JESÚS    SUBJECTIVE: Patient reports feeling well overall. States she is thirsty and hungry but has no additional complaints.     Vital Signs Last 12 Hrs  T(F): 97.4 (02-25-18 @ 08:34), Max: 98.5 (02-25-18 @ 06:02)  HR: 114 (02-25-18 @ 08:34) (111 - 152)  BP: 126/79 (02-25-18 @ 08:34) (115/66 - 126/79)  BP(mean): --  RR: 17 (02-25-18 @ 08:34) (17 - 19)  SpO2: 99% (02-25-18 @ 08:34) (99% - 100%)  I&O's Summary    24 Feb 2018 07:01  -  25 Feb 2018 07:00  --------------------------------------------------------  IN: 0 mL / OUT: 200 mL / NET: -200 mL    PHYSICAL EXAM:  Constitutional: NAD, comfortable in bed.  HEENT: NC/AT, PERRLA, EOMI, no conjunctival pallor or scleral icterus, dry MM  Neck: Supple, no JVD  Respiratory: Breathing comfortably on NC. Normal rate, rhythm, depth, effort. CTAB.   Cardiovascular: RRR, normal S1 and S2, no m/r/g.   Gastrointestinal: +BS, soft NTND, no guarding or rebound tenderness, no palpable masses   Extremities: wwp; no cyanosis, clubbing or edema.   Vascular: Pulses equal and strong throughout.   Neurological: AAOx1, no CN deficits, strength and sensation intact throughout.   Skin: No gross skin abnormalities or rashes    LABS:                        11.9   8.1   )-----------( 196      ( 25 Feb 2018 07:09 )             38.5     02-25    144  |  107  |  25<H>  ----------------------------<  103<H>  5.0   |  27  |  0.80    Ca    8.8      25 Feb 2018 07:09  Phos  3.0     02-24  Mg     2.7     02-25    RADIOLOGY & ADDITIONAL TESTS:    MEDICATIONS  (STANDING):  ALBUTerol/ipratropium for Nebulization 3 milliLiter(s) Nebulizer every 4 hours  amoxicillin  875 milliGRAM(s)/clavulanate 1 Tablet(s) Oral two times a day  artificial  tears Solution 1 Drop(s) Both EYES three times a day  aspirin  chewable 81 milliGRAM(s) Oral daily  atorvastatin 40 milliGRAM(s) Oral at bedtime  heparin  Injectable 5000 Unit(s) SubCutaneous every 8 hours  metoprolol     tartrate 12.5 milliGRAM(s) Oral two times a day    MEDICATIONS  (PRN):

## 2018-02-25 NOTE — PROGRESS NOTE ADULT - ASSESSMENT
ASSESSMENT/PLAN    1) Congestive heart failure  2) Pneumonia/atelectasis  3) Pulmonary hypertension  4) Status post respiratory failure      Oxygen as needed for satisfactory SpO2 to avoid hypercapnia  Bronchodilators:  Atrovent/ albuterol q 4 – 6 hours as needed  ID/Antibiotics: status post Zosyn, now Augmentin  Cardiac/HTN: diuresis as needed  GI: Rx/ prophylaxis c PPI/H2B  Heme: Rx/VT prophylaxis heparin sq  Aspiration precautions at all times  Discussed with medical team.

## 2018-02-25 NOTE — PROGRESS NOTE ADULT - PROBLEM SELECTOR PLAN 3
Chronic. however risk of anticoagulation outweighs benefits given pt's age and functional status  -[id number] 5, family did not want anticoagulation  -Lopressor 12.5 BID for rate control

## 2018-02-25 NOTE — PROGRESS NOTE ADULT - SUBJECTIVE AND OBJECTIVE BOX
Interventional, Pulmonary, Critical, Chest Special Procedures.    Pt was seen and fully examined by myself.     Time spent with patient in minutes:37    Patient is a 89y old  Female who presents with a chief complaint of SOB. The patient c no new complaints today. Eupneic when seen.  HPI:  89 yo F with PMH CHF, HTN, HLD, hypothyroidism, osteoporosis PSHx triple bypass and mitral valve repair, presented from nursing/rehab center where she was found to be altered, tachypneic and in respiratory distress. In ED she was noted to be altered, tachypneic and tachycardic so she was placed on BIPAP. Vitals -126, /82, RR 17-27, SpO2 94% on BIPAP. CT head was negative for any acute pathology. EKG showed Aflutter with variable block, LAD, LVH with repolarization abnormality Labs were remarkable for Trop 0.02, pro-BNP 4552. VBG showed respiratory acidosis pH 7.23, pCO2 80, HCO3 33.  Bedside echo performed by cardio fellow showed EF 35-40%, LVH and hypokinetic RV. Chest X ray showed congestion and/or infiltrates with bilateral effusions. CTA PE protocol was ordered given hypoxia and signs of right heart failure. CTA was negative for PE, showed large bilateral pleural effusions and atelectasis of the bilateral lower lobes with patchy airspace opacities in the right upper lobe most likely representing pneumonia. Pt received 40 mg IV lasix and was transfered to CCU for further management. Upon arrival in the unit, the patient remained on BIPAP and altered, minimally responsive. (18 Feb 2018 16:13)    REVIEW OF SYSTEMS:  ROS reviewed below with positive findings marked (+) :  GEN:  fever, chills ENT: tracheostomy,   epistaxis,  sinusitis COR: CAD, CHF,  HTN, dysrhythmia PUL: COPD, ILD, asthma, pneumonia GI: PEG, dysphagia, hemorrhage, other KATELYN: kidney disease, electrolyte disorder HEM:  anemia, thrombus, coagulopathy, cancer ENDO:  thyroid disease, diabetes mellitus CNS:  dementia, stroke, seizure, PSY:  depression, anxiety, other  PAST MEDICAL & SURGICAL HISTORY:  Hyperlipidemia  Hypothyroid  Hypertension  Atherosclerosis  S/P CABG x 3    FAMILY HISTORY:  No significant family history    SOCIAL HISTORY:      - Tobacco     - ETOH    Allergies    aminocaproic acid (Unknown)    Intolerances      Vital Signs Last 24 Hrs  T(C): 36.3 (25 Feb 2018 08:34), Max: 36.9 (25 Feb 2018 06:02)  T(F): 97.4 (25 Feb 2018 08:34), Max: 98.5 (25 Feb 2018 06:02)  HR: 114 (25 Feb 2018 08:34) (102 - 152)  BP: 126/79 (25 Feb 2018 08:34) (108/75 - 126/79)  BP(mean): 86 (24 Feb 2018 15:20) (86 - 86)  RR: 17 (25 Feb 2018 08:34) (17 - 20)  SpO2: 99% (25 Feb 2018 08:34) (95% - 100%)    02-24 @ 07:01  -  02-25 @ 07:00  --------------------------------------------------------  IN: 0 mL / OUT: 200 mL / NET: -200 mL    02-25 @ 07:01  -  02-25 @ 14:06  --------------------------------------------------------  IN: 0 mL / OUT: 100 mL / NET: -100 mL        PHYSICAL EXAM:  GENERAL:         comfortable,  - distress.  HEENT:            - trauma,  - icterus,  - injection,  - nasal discharge.  NECK:              - jugular venous distention, - thyromegaly.  LYMPH:           - lymphadenopathy, - masses.  RESP:               + crackles,   - rhonchi,   - wheezes.   COR:                S1S2   - gallops,  - rubs.  ABD:                bowel sounds,   soft, - tender, - distended, - organomegaly.  EXT/MSC:         - cyanosis,  - clubbing,  - edema.    NEURO:             alert,   responds to stimuli.    DEVICES:  - DENTURES   +IV R / L     - ETUBE   -TRACH   -CTUBE  R / L      LABS:                          11.9   8.1   )-----------( 196      ( 25 Feb 2018 07:09 )             38.5     02-25    144  |  107  |  25<H>  ----------------------------<  103<H>  5.0   |  27  |  0.80    Ca    8.8      25 Feb 2018 07:09  Phos  3.0     02-24  Mg     2.7     02-25        RADIOLOGY & ADDITIONAL STUDIES (The following images were personally reviewed):

## 2018-02-25 NOTE — PROGRESS NOTE ADULT - PROBLEM SELECTOR PLAN 3
Chronic. however risk of anticoagulation outweighs benefits given pt's age and functional status  -[id number] 5, family did not want anticoagulation  -Lopressor 12.5 BID for rate control

## 2018-02-25 NOTE — PROGRESS NOTE ADULT - PROBLEM SELECTOR PLAN 9
F: no IVF  E: K>4 Mg>2, monitor and replete as needed  N: mechanical soft, thin liquids  Ppx: SQH  D: RMF  DNR/DNI: per discussion with pt son, no invasive measures like A-lines or central lines or procedures, but ok with IV abx & diuresis - see chart note for full details

## 2018-02-25 NOTE — PROGRESS NOTE ADULT - PROBLEM SELECTOR PLAN 4
RESOLVED. Pt meeting sepsis criteria on admission (tachypnea, tachycardia and suspected PNA on CT chest).   -CT chest with patchy airspace opacities in the right upper lobe representing pneumonia.  RVP negative.  -DC Vanc  -DC Zosyn completed 5 day course, additional 2 days of augmentin

## 2018-02-26 LAB
ANION GAP SERPL CALC-SCNC: 8 MMOL/L — SIGNIFICANT CHANGE UP (ref 5–17)
BUN SERPL-MCNC: 25 MG/DL — HIGH (ref 7–23)
CALCIUM SERPL-MCNC: 8.6 MG/DL — SIGNIFICANT CHANGE UP (ref 8.4–10.5)
CHLORIDE SERPL-SCNC: 107 MMOL/L — SIGNIFICANT CHANGE UP (ref 96–108)
CO2 SERPL-SCNC: 30 MMOL/L — SIGNIFICANT CHANGE UP (ref 22–31)
CREAT SERPL-MCNC: 0.79 MG/DL — SIGNIFICANT CHANGE UP (ref 0.5–1.3)
GLUCOSE SERPL-MCNC: 95 MG/DL — SIGNIFICANT CHANGE UP (ref 70–99)
HCT VFR BLD CALC: 37.3 % — SIGNIFICANT CHANGE UP (ref 34.5–45)
HGB BLD-MCNC: 11.1 G/DL — LOW (ref 11.5–15.5)
MAGNESIUM SERPL-MCNC: 2.7 MG/DL — HIGH (ref 1.6–2.6)
MCHC RBC-ENTMCNC: 28.3 PG — SIGNIFICANT CHANGE UP (ref 27–34)
MCHC RBC-ENTMCNC: 29.8 G/DL — LOW (ref 32–36)
MCV RBC AUTO: 95.2 FL — SIGNIFICANT CHANGE UP (ref 80–100)
PHOSPHATE SERPL-MCNC: 4 MG/DL — SIGNIFICANT CHANGE UP (ref 2.5–4.5)
PLATELET # BLD AUTO: 176 K/UL — SIGNIFICANT CHANGE UP (ref 150–400)
POTASSIUM SERPL-MCNC: 4.7 MMOL/L — SIGNIFICANT CHANGE UP (ref 3.5–5.3)
POTASSIUM SERPL-SCNC: 4.7 MMOL/L — SIGNIFICANT CHANGE UP (ref 3.5–5.3)
RBC # BLD: 3.92 M/UL — SIGNIFICANT CHANGE UP (ref 3.8–5.2)
RBC # FLD: 15.8 % — SIGNIFICANT CHANGE UP (ref 10.3–16.9)
SODIUM SERPL-SCNC: 145 MMOL/L — SIGNIFICANT CHANGE UP (ref 135–145)
WBC # BLD: 9.1 K/UL — SIGNIFICANT CHANGE UP (ref 3.8–10.5)
WBC # FLD AUTO: 9.1 K/UL — SIGNIFICANT CHANGE UP (ref 3.8–10.5)

## 2018-02-26 RX ORDER — METOPROLOL TARTRATE 50 MG
25 TABLET ORAL
Qty: 0 | Refills: 0 | Status: DISCONTINUED | OUTPATIENT
Start: 2018-02-26 | End: 2018-02-27

## 2018-02-26 RX ADMIN — Medication 1 DROP(S): at 21:53

## 2018-02-26 RX ADMIN — Medication 25 MILLIGRAM(S): at 17:42

## 2018-02-26 RX ADMIN — ATORVASTATIN CALCIUM 40 MILLIGRAM(S): 80 TABLET, FILM COATED ORAL at 21:53

## 2018-02-26 RX ADMIN — HEPARIN SODIUM 5000 UNIT(S): 5000 INJECTION INTRAVENOUS; SUBCUTANEOUS at 21:52

## 2018-02-26 RX ADMIN — Medication 3 MILLILITER(S): at 17:42

## 2018-02-26 RX ADMIN — Medication 1 DROP(S): at 05:15

## 2018-02-26 RX ADMIN — Medication 3 MILLILITER(S): at 00:54

## 2018-02-26 RX ADMIN — Medication 3 MILLILITER(S): at 10:11

## 2018-02-26 RX ADMIN — Medication 1 DROP(S): at 13:22

## 2018-02-26 RX ADMIN — Medication 18.75 MILLIGRAM(S): at 05:18

## 2018-02-26 RX ADMIN — Medication 3 MILLILITER(S): at 05:16

## 2018-02-26 RX ADMIN — HEPARIN SODIUM 5000 UNIT(S): 5000 INJECTION INTRAVENOUS; SUBCUTANEOUS at 05:15

## 2018-02-26 RX ADMIN — Medication 81 MILLIGRAM(S): at 12:56

## 2018-02-26 RX ADMIN — HEPARIN SODIUM 5000 UNIT(S): 5000 INJECTION INTRAVENOUS; SUBCUTANEOUS at 13:22

## 2018-02-26 RX ADMIN — Medication 3 MILLILITER(S): at 21:52

## 2018-02-26 RX ADMIN — Medication 3 MILLILITER(S): at 14:15

## 2018-02-26 NOTE — PROGRESS NOTE ADULT - SUBJECTIVE AND OBJECTIVE BOX
OVERNIGHT EVENTS: JESÚS    SUBJECTIVE: Patient seen and examined at bedside. Alert and awake, aware of where she is. Denies any pain, reports a good appetite and states she is tolerating PO intake well. Breathing well on RA and denies any SOB. No additional complaints.     Vital Signs Last 12 Hrs  T(F): 97.7 (02-26-18 @ 09:59), Max: 98.7 (02-26-18 @ 05:34)  HR: 85 (02-26-18 @ 09:59) (85 - 96)  BP: 118/63 (02-26-18 @ 09:59) (116/71 - 118/63)  BP(mean): --  RR: 18 (02-26-18 @ 09:59) (18 - 18)  SpO2: 97% (02-26-18 @ 09:59) (97% - 100%)  I&O's Summary    25 Feb 2018 07:01  -  26 Feb 2018 07:00  --------------------------------------------------------  IN: 180 mL / OUT: 650 mL / NET: -470 mL    PHYSICAL EXAM:  Constitutional: NAD, comfortable in bed.  HEENT: NC/AT, PERRLA, EOMI, no conjunctival pallor or scleral icterus, MMM  Neck: Supple, no JVD  Respiratory: Normal rate, rhythm, depth, effort. CTAB. No w/r/r.   Cardiovascular: RRR, normal S1 and S2, no m/r/g.   Gastrointestinal: +BS, soft NTND, no guarding or rebound tenderness, no palpable masses   Extremities: wwp; no cyanosis, clubbing or edema.   Vascular: Pulses equal and strong throughout.   Neurological: AAOx3, no CN deficits, strength and sensation intact throughout.   Skin: No gross skin abnormalities or rashes    LABS:                        11.1   9.1   )-----------( 176      ( 26 Feb 2018 06:09 )             37.3     02-26    145  |  107  |  25<H>  ----------------------------<  95  4.7   |  30  |  0.79    Ca    8.6      26 Feb 2018 06:09  Phos  4.0     02-26  Mg     2.7     02-26    RADIOLOGY & ADDITIONAL TESTS:    MEDICATIONS  (STANDING):  ALBUTerol/ipratropium for Nebulization 3 milliLiter(s) Nebulizer every 4 hours  artificial  tears Solution 1 Drop(s) Both EYES three times a day  aspirin  chewable 81 milliGRAM(s) Oral daily  atorvastatin 40 milliGRAM(s) Oral at bedtime  heparin  Injectable 5000 Unit(s) SubCutaneous every 8 hours  metoprolol      tartrate Suspension 18.75 milliGRAM(s) Oral two times a day    MEDICATIONS  (PRN):

## 2018-02-26 NOTE — PROGRESS NOTE ADULT - PROBLEM SELECTOR PLAN 8
Pt with Hx of hypothyroidism per documentation from rehab/nursing facility but no synthroid listed   -TSH slightly elevated, will not start synthroid in setting of acute illness  -f/u in OP

## 2018-02-26 NOTE — PROGRESS NOTE ADULT - SUBJECTIVE AND OBJECTIVE BOX
CC/ HPI Patient is a 89 year old female with CAD (s/p 3-vessel CABG), MV repair, sCHF, HTN, admitted from rehab center where she was found to be altered, tachypneic and in respiratory distress, admitted to the CCU for CHF exacerbation complicated by hypoxic hypercapnic respiratory failure, atrial fibrillation with RVR, pneumonia, seen this morning the patient denies respiratory complaint.    PAST MEDICAL & SURGICAL HISTORY:  Hyperlipidemia  Hypothyroid  Hypertension  Atherosclerosis  S/P CABG x 3    SOCHX:  -  alcohol    FMHX: FA/MO  - contributory     ROS reviewed below with positive findings marked (+) :  GEN:  fever, chills ENT: tracheostomy,   epistaxis,  sinusitis COR: CAD, CHF,  HTN, dysrhythmia PUL: COPD, ILD, asthma, pneumonia GI: PEG, dysphagia, hemorrhage, other KATELYN: kidney disease, electrolyte disorder HEM:  anemia, thrombus, coagulopathy, cancer ENDO:  thyroid disease, diabetes mellitus CNS:  dementia, stroke, seizure, PSY:  depression, anxiety, other      MEDICATIONS  (STANDING):  ALBUTerol/ipratropium for Nebulization 3 milliLiter(s) Nebulizer every 4 hours  artificial  tears Solution 1 Drop(s) Both EYES three times a day  aspirin  chewable 81 milliGRAM(s) Oral daily  atorvastatin 40 milliGRAM(s) Oral at bedtime  heparin  Injectable 5000 Unit(s) SubCutaneous every 8 hours  metoprolol     tartrate 25 milliGRAM(s) Oral two times a day    MEDICATIONS  (PRN):      Vital Signs Last 24 Hrs  T(C): 36.5 (26 Feb 2018 09:59), Max: 37.1 (26 Feb 2018 05:34)  T(F): 97.7 (26 Feb 2018 09:59), Max: 98.7 (26 Feb 2018 05:34)  HR: 85 (26 Feb 2018 09:59) (85 - 120)  BP: 118/63 (26 Feb 2018 09:59) (110/73 - 122/72)  BP(mean): --  RR: 18 (26 Feb 2018 09:59) (18 - 18)  SpO2: 97% (26 Feb 2018 09:59) (94% - 100%)    GENERAL:         comfortable,  - distress.  HEENT:            - trauma,  - icterus,  - injection,  - nasal discharge.  NECK:              - jugular venous distention, - thyromegaly.  LYMPH:           - lymphadenopathy, - masses.  RESP:               + crackles,   - rhonchi,   - wheezes.   COR:                S1S2   - gallops,  - rubs.  ABD:                bowel sounds,   soft, - tender, - distended, - organomegaly.  EXT/MSC:         - cyanosis,  - clubbing,  - edema.    NEURO:             alert,   responds to stimuli.                          11.1   9.1   )-----------( 176      ( 26 Feb 2018 06:09 )             37.3     02-26    145  |  107  |  25<H>  ----------------------------<  95  4.7   |  30  |  0.79            CT Angio Chest PE Protocol (02.18.18)  No acute or chronic pulmonary embolism.  2. Large bilateral pleural effusions. Atelectasis of the bilateral lower lobes. Patchy airspace opacities in the right upper lobe most likely representing pneumonia.      ASSESSMENT/PLAN    1) Congestive heart failure  2) Status post pneumonia/atelectasis  3) Pulmonary hypertension    Oxygen as needed for satisfactory SpO2 to avoid hypercapnia  Bronchodilators:  Atrovent/ albuterol q 4 – 6 hours as needed  ID/Antibiotics: status post Zosyn  Cardiac/HTN: diuresis as needed  GI: Rx/ prophylaxis c PPI/H2B  Heme: Rx/VT prophylaxis  Discussed with Dr. Seaman.

## 2018-02-26 NOTE — PROGRESS NOTE ADULT - PROBLEM SELECTOR PLAN 3
Chronic. however risk of anticoagulation outweighs benefits given pt's age and functional status  -[id number] 5, family did not want anticoagulation  -Lopressor 25mg BID for rate control

## 2018-02-26 NOTE — PROGRESS NOTE ADULT - PROBLEM SELECTOR PLAN 1
CTA PE protocol showed cardiomegaly and post MVR, right cardiac chamber  enlargement suggesting right-sided congestive heart failure.    -Echo demonstrating EF 50-55%, gross dilation  -Diuresed with PRN IV Lasix, will continue as needed   -Monitor I/O, DC [place]  -increase metoprolol tartrate to 25 BID, continue to monitor HR  -will restart ACE-I/ARB as tolerated    #Pulmonary Hypertension  Likely class 2, no reported history of primary pulmonary disease   -70mmHg  -maintain euvolemic

## 2018-02-26 NOTE — PROGRESS NOTE ADULT - PROBLEM SELECTOR PLAN 5
w/ history of 3 vessel CABG  -no evidence of ACS at this time  -lopressor 25 BID  -holding ACEI  -c/w 81mg ASA

## 2018-02-27 DIAGNOSIS — K59.00 CONSTIPATION, UNSPECIFIED: ICD-10-CM

## 2018-02-27 RX ORDER — SENNA PLUS 8.6 MG/1
1 TABLET ORAL AT BEDTIME
Qty: 0 | Refills: 0 | Status: DISCONTINUED | OUTPATIENT
Start: 2018-02-27 | End: 2018-02-28

## 2018-02-27 RX ORDER — METOPROLOL TARTRATE 50 MG
25 TABLET ORAL
Qty: 0 | Refills: 0 | Status: DISCONTINUED | OUTPATIENT
Start: 2018-02-27 | End: 2018-02-28

## 2018-02-27 RX ORDER — POLYETHYLENE GLYCOL 3350 17 G/17G
17 POWDER, FOR SOLUTION ORAL DAILY
Qty: 0 | Refills: 0 | Status: DISCONTINUED | OUTPATIENT
Start: 2018-02-27 | End: 2018-02-27

## 2018-02-27 RX ORDER — LISINOPRIL 2.5 MG/1
2.5 TABLET ORAL DAILY
Qty: 0 | Refills: 0 | Status: DISCONTINUED | OUTPATIENT
Start: 2018-02-28 | End: 2018-02-28

## 2018-02-27 RX ORDER — TAMSULOSIN HYDROCHLORIDE 0.4 MG/1
0.4 CAPSULE ORAL AT BEDTIME
Qty: 0 | Refills: 0 | Status: DISCONTINUED | OUTPATIENT
Start: 2018-02-27 | End: 2018-02-28

## 2018-02-27 RX ORDER — DOCUSATE SODIUM 100 MG
100 CAPSULE ORAL
Qty: 0 | Refills: 0 | Status: DISCONTINUED | OUTPATIENT
Start: 2018-02-27 | End: 2018-02-28

## 2018-02-27 RX ADMIN — Medication 3 MILLILITER(S): at 06:04

## 2018-02-27 RX ADMIN — Medication 25 MILLIGRAM(S): at 17:57

## 2018-02-27 RX ADMIN — TAMSULOSIN HYDROCHLORIDE 0.4 MILLIGRAM(S): 0.4 CAPSULE ORAL at 21:49

## 2018-02-27 RX ADMIN — Medication 1 DROP(S): at 06:04

## 2018-02-27 RX ADMIN — Medication 1 DROP(S): at 21:47

## 2018-02-27 RX ADMIN — Medication 1 DROP(S): at 13:39

## 2018-02-27 RX ADMIN — Medication 3 MILLILITER(S): at 12:35

## 2018-02-27 RX ADMIN — ATORVASTATIN CALCIUM 40 MILLIGRAM(S): 80 TABLET, FILM COATED ORAL at 21:49

## 2018-02-27 RX ADMIN — Medication 100 MILLIGRAM(S): at 17:58

## 2018-02-27 RX ADMIN — SENNA PLUS 1 TABLET(S): 8.6 TABLET ORAL at 21:48

## 2018-02-27 RX ADMIN — Medication 81 MILLIGRAM(S): at 13:38

## 2018-02-27 RX ADMIN — HEPARIN SODIUM 5000 UNIT(S): 5000 INJECTION INTRAVENOUS; SUBCUTANEOUS at 13:38

## 2018-02-27 RX ADMIN — Medication 3 MILLILITER(S): at 21:48

## 2018-02-27 RX ADMIN — Medication 3 MILLILITER(S): at 09:35

## 2018-02-27 RX ADMIN — Medication 3 MILLILITER(S): at 17:58

## 2018-02-27 RX ADMIN — Medication 3 MILLILITER(S): at 01:00

## 2018-02-27 RX ADMIN — POLYETHYLENE GLYCOL 3350 17 GRAM(S): 17 POWDER, FOR SOLUTION ORAL at 12:35

## 2018-02-27 RX ADMIN — HEPARIN SODIUM 5000 UNIT(S): 5000 INJECTION INTRAVENOUS; SUBCUTANEOUS at 21:48

## 2018-02-27 RX ADMIN — HEPARIN SODIUM 5000 UNIT(S): 5000 INJECTION INTRAVENOUS; SUBCUTANEOUS at 06:05

## 2018-02-27 RX ADMIN — Medication 25 MILLIGRAM(S): at 06:04

## 2018-02-27 NOTE — PROGRESS NOTE ADULT - ASSESSMENT
89F with a PMH of CAD (s/p 3-vessel CABG), MV repair, sCHF, HTN, HLD, hypothyroidism, and osteoporosis who presented from nursing/rehab center where she was found to be altered, tachypneic and in respiratory distress, admitted to the CCU for CHF exacerbation and hypoxic hypercapnic respiratory failure c/b HCAP and Afib RVR.    Problem/Plan - 1:  ·  Problem: Acute on chronic systolic congestive heart failure.  Plan: CTA PE protocol showed cardiomegaly and post MVR, right cardiac chamber  enlargement suggesting right-sided congestive heart failure.    -Echo demonstrating EF 50-55%, gross dilation  -Diuresed with PRN IV Lasix, will continue as needed   -Monitor I/O, DC stockton  -increase metoprolol tartrate to 25 BID, continue to monitor HR  -will restart ACE-I/ARB as tolerated    #Pulmonary Hypertension  Likely class 2, no reported history of primary pulmonary disease   -70mmHg  -maintain euvolemic.     Problem/Plan - 2:  ·  Problem: Hypercapnic respiratory failure.  Plan: 2/2 CHF exacerbation and HAP. CT chest with large bilateral pleural effusions, atelectasis.   -BiPAP at night  -currently sating well on 2L NC.     Problem/Plan - 3:  ·  Problem: Afib.  Plan: Chronic. however risk of anticoagulation outweighs benefits given pt's age and functional status  -[id number] 5, family did not want anticoagulation  -Lopressor 25mg BID for rate control.     Problem/Plan - 4:  ·  Problem: Pneumonia.  Plan: RESOLVED. Pt meeting sepsis criteria on admission (tachypnea, tachycardia and suspected PNA on CT chest).   -CT chest with patchy airspace opacities in the right upper lobe representing pneumonia.  RVP negative.  -DC Vanc  -DC Zosyn completed 5 day course, additional 2 days of augmentin.

## 2018-02-27 NOTE — PROGRESS NOTE ADULT - SUBJECTIVE AND OBJECTIVE BOX
OVERNIGHT EVENTS: Patient retaining urine overnight, straight cath'd with >300cc output.    SUBJECTIVE: Patient reports feeling well overall. States she has not moved her bowels in a couple days.     Vital Signs Last 12 Hrs  T(F): 98.1 (02-27-18 @ 12:32), Max: 98.2 (02-27-18 @ 05:20)  HR: 84 (02-27-18 @ 12:32) (66 - 99)  BP: 142/82 (02-27-18 @ 12:32) (139/82 - 142/82)  BP(mean): --  RR: 18 (02-27-18 @ 12:32) (17 - 18)  SpO2: 95% (02-27-18 @ 12:32) (95% - 97%)  I&O's Summary    26 Feb 2018 07:01  -  27 Feb 2018 07:00  --------------------------------------------------------  IN: 0 mL / OUT: 320 mL / NET: -320 mL    PHYSICAL EXAM:  Constitutional: NAD, comfortable in bed.  HEENT: NC/AT, PERRLA, EOMI, no conjunctival pallor or scleral icterus, MMM  Neck: Supple, no JVD  Respiratory: Normal rate, rhythm, depth, effort. CTAB. No w/r/r.   Cardiovascular: RRR, normal S1 and S2, no m/r/g.   Gastrointestinal: +BS, soft NTND, no guarding or rebound tenderness, no palpable masses   Extremities: wwp; no cyanosis, clubbing or edema.   Vascular: Pulses equal and strong throughout.   Neurological: AAOx3, no CN deficits, strength and sensation intact throughout.   Skin: No gross skin abnormalities or rashes    LABS:                        11.1   9.1   )-----------( 176      ( 26 Feb 2018 06:09 )             37.3     02-26    145  |  107  |  25<H>  ----------------------------<  95  4.7   |  30  |  0.79    Ca    8.6      26 Feb 2018 06:09  Phos  4.0     02-26  Mg     2.7     02-26    RADIOLOGY & ADDITIONAL TESTS:    MEDICATIONS  (STANDING):  ALBUTerol/ipratropium for Nebulization 3 milliLiter(s) Nebulizer every 4 hours  artificial  tears Solution 1 Drop(s) Both EYES three times a day  aspirin  chewable 81 milliGRAM(s) Oral daily  atorvastatin 40 milliGRAM(s) Oral at bedtime  docusate sodium 100 milliGRAM(s) Oral two times a day  heparin  Injectable 5000 Unit(s) SubCutaneous every 8 hours  metoprolol     tartrate 25 milliGRAM(s) Oral two times a day  polyethylene glycol 3350 17 Gram(s) Oral daily  senna 1 Tablet(s) Oral at bedtime    MEDICATIONS  (PRN): OVERNIGHT EVENTS: Patient retaining urine overnight, straight cath'd with >300cc output.    SUBJECTIVE: Patient reports feeling well overall. States she has not moved her bowels in a couple days. States she is tolerating food well, no additional complaints.     Vital Signs Last 12 Hrs  T(F): 98.1 (02-27-18 @ 12:32), Max: 98.2 (02-27-18 @ 05:20)  HR: 84 (02-27-18 @ 12:32) (66 - 99)  BP: 142/82 (02-27-18 @ 12:32) (139/82 - 142/82)  BP(mean): --  RR: 18 (02-27-18 @ 12:32) (17 - 18)  SpO2: 95% (02-27-18 @ 12:32) (95% - 97%)  I&O's Summary    26 Feb 2018 07:01  -  27 Feb 2018 07:00  --------------------------------------------------------  IN: 0 mL / OUT: 320 mL / NET: -320 mL    PHYSICAL EXAM:  Constitutional: NAD, comfortable in bed.  HEENT: NC/AT, PERRLA, EOMI, no conjunctival pallor or scleral icterus, MMM  Neck: Supple, no JVD  Respiratory: Normal rate, rhythm, depth, effort. CTAB. No w/r/r.   Cardiovascular: RRR, normal S1 and S2, no m/r/g.   Gastrointestinal: +BS, soft NTND, no guarding or rebound tenderness, no palpable masses   Extremities: wwp; no cyanosis, clubbing or edema.   Vascular: Pulses equal and strong throughout.   Neurological: AAOx3, no CN deficits, strength and sensation intact throughout.   Skin: No gross skin abnormalities or rashes    LABS:                        11.1   9.1   )-----------( 176      ( 26 Feb 2018 06:09 )             37.3     02-26    145  |  107  |  25<H>  ----------------------------<  95  4.7   |  30  |  0.79    Ca    8.6      26 Feb 2018 06:09  Phos  4.0     02-26  Mg     2.7     02-26    RADIOLOGY & ADDITIONAL TESTS:    MEDICATIONS  (STANDING):  ALBUTerol/ipratropium for Nebulization 3 milliLiter(s) Nebulizer every 4 hours  artificial  tears Solution 1 Drop(s) Both EYES three times a day  aspirin  chewable 81 milliGRAM(s) Oral daily  atorvastatin 40 milliGRAM(s) Oral at bedtime  docusate sodium 100 milliGRAM(s) Oral two times a day  heparin  Injectable 5000 Unit(s) SubCutaneous every 8 hours  metoprolol     tartrate 25 milliGRAM(s) Oral two times a day  polyethylene glycol 3350 17 Gram(s) Oral daily  senna 1 Tablet(s) Oral at bedtime    MEDICATIONS  (PRN):

## 2018-02-27 NOTE — PROGRESS NOTE ADULT - PROBLEM SELECTOR PLAN 2
Patient reports not moving her bowels, reports feeling bloated.   -started on Miralax, senna, colace    #Urinary retention  -may be 2/2 constipation, will reinsert stockton for now and have TOV after constipation resolves

## 2018-02-27 NOTE — PROGRESS NOTE ADULT - PROBLEM SELECTOR PROBLEM 2
Atelectasis
Hypercapnic respiratory failure
Altered mental state
Altered mental state
Constipation
Hypercapnic respiratory failure
Constipation
Hypercapnic respiratory failure

## 2018-02-27 NOTE — PROGRESS NOTE ADULT - SUBJECTIVE AND OBJECTIVE BOX
CC/ HPI Patient is a 89 year old female with CAD (s/p 3-vessel CABG), MV repair, sCHF, HTN, admitted from rehab center where she was found to be altered, tachypneic and in respiratory distress, admitted to the CCU for CHF exacerbation complicated by hypoxic hypercapnic respiratory failure, atrial fibrillation with RVR, pneumonia, seen this morning the patient denies respiratory complaint.    PAST MEDICAL & SURGICAL HISTORY:  Hyperlipidemia  Hypothyroid  Hypertension  Atherosclerosis  S/P CABG x 3    SOCHX:  -  alcohol    FMHX: FA/MO  - contributory     ROS reviewed below with positive findings marked (+) :  GEN:  fever, chills ENT: tracheostomy,   epistaxis,  sinusitis COR: CAD, CHF,  HTN, dysrhythmia PUL: COPD, ILD, asthma, pneumonia GI: PEG, dysphagia, hemorrhage, other KATELYN: kidney disease, electrolyte disorder HEM:  anemia, thrombus, coagulopathy, cancer ENDO:  thyroid disease, diabetes mellitus CNS:  dementia, stroke, seizure, PSY:  depression, anxiety, other        MEDICATIONS  (STANDING):  ALBUTerol/ipratropium for Nebulization 3 milliLiter(s) Nebulizer every 4 hours  artificial  tears Solution 1 Drop(s) Both EYES three times a day  aspirin  chewable 81 milliGRAM(s) Oral daily  atorvastatin 40 milliGRAM(s) Oral at bedtime  heparin  Injectable 5000 Unit(s) SubCutaneous every 8 hours  metoprolol     tartrate 25 milliGRAM(s) Oral two times a day    MEDICATIONS  (PRN):      Vital Signs Last 24 Hrs  T(C): 36.8 (27 Feb 2018 05:20), Max: 37.2 (26 Feb 2018 15:20)  T(F): 98.2 (27 Feb 2018 05:20), Max: 98.9 (26 Feb 2018 15:20)  HR: 66 (27 Feb 2018 09:50) (66 - 107)  BP: 139/82 (27 Feb 2018 05:20) (110/68 - 139/82)  BP(mean): 74 (26 Feb 2018 15:20) (74 - 74)  RR: 17 (27 Feb 2018 09:50) (16 - 18)  SpO2: 96% (27 Feb 2018 09:50) (92% - 97%)    GENERAL:         comfortable,  - distress.  HEENT:            - trauma,  - icterus,  - injection,  - nasal discharge.  NECK:              - jugular venous distention, - thyromegaly.  LYMPH:           - lymphadenopathy, - masses.  RESP:              + crackles,   - rhonchi,   - wheezes.   COR:                S1S2   - gallops,  - rubs.  ABD:                bowel sounds,   soft, - tender, - distended, - organomegaly.  EXT/MSC:         - cyanosis,  - clubbing,  - edema.    NEURO:             alert,   responds to stimuli.                          11.1   9.1   )-----------( 176      ( 26 Feb 2018 06:09 )             37.3     02-26    145  |  107  |  25<H>  ----------------------------<  95  4.7   |  30  |  0.79          CT Angio Chest PE Protocol (02.18.18)  No acute or chronic pulmonary embolism.  2. Large bilateral pleural effusions. Atelectasis of the bilateral lower lobes. Patchy airspace opacities in the right upper lobe most likely representing pneumonia.      ASSESSMENT/PLAN    1) Congestive heart failure  2) Status post pneumonia/atelectasis  3) Pulmonary hypertension    SpO2 satisfactory   Bronchodilators:  Atrovent/ albuterol q 4 – 6 hours as needed  ID/Antibiotics: status post Zosyn  Cardiac/HTN: diuresis as needed  GI: Rx/ prophylaxis c PPI/H2B  Heme: Rx/VT prophylaxis  Discussed with medical team.

## 2018-02-27 NOTE — PROGRESS NOTE ADULT - PROBLEM SELECTOR PLAN 10
F: no IVF  E: K>4 Mg>2, monitor and replete as needed  N: mechanical soft, thin liquids  Ppx: SQH  D: RMF  DNR/DNI: per discussion with pt son, no invasive measures like A-lines or central lines or procedures, but ok with IV abx & diuresis - see chart note for full details
F: no IVF  E: K>4 Mg>2, monitor and replete as needed  N: mechanical soft, thin liquids  Ppx: SQH  D: RMF  DNR/DNI: per discussion with pt son, no invasive measures like A-lines or central lines or procedures, but ok with IV abx & diuresis - see chart note for full details
F: no IVF  E: K>4 Mg>2, monitor and replete as needed  N: NPO  Ppx: SQH  D: RMF  DNR/DNI: per discussion with pt son, no invasive measures like A-lines or central lines or procedures, but ok with IV abx & diuresis - see chart note for full details

## 2018-02-27 NOTE — CHART NOTE - NSCHARTNOTEFT_GEN_A_CORE
Admitting Diagnosis:   Patient is a 89y old  Female presented from nursing/rehab center where she was found to be altered, tachypneic and in respiratory distress, admitted to the CCU for CHF exacerbation and hypoxic hypercapnic respiratory failure c/b HCAP and Afib RVR.     PAST MEDICAL & SURGICAL HISTORY:  Hyperlipidemia  Hypothyroid  Hypertension  Atherosclerosis  S/P CABG x 3      Current Nutrition Order: DYS 2 Mechanical Soft-Thin liquids       PO Intake: Good (%) [   ]  Fair (50-75%) [   ] Poor (<25%) [   ]    GI Issues: constipated    Pain:     Skin Integrity:    Labs:   02-26    145  |  107  |  25<H>  ----------------------------<  95  4.7   |  30  |  0.79    Ca    8.6      26 Feb 2018 06:09  Phos  4.0     02-26  Mg     2.7     02-26      CAPILLARY BLOOD GLUCOSE          Medications:  MEDICATIONS  (STANDING):  ALBUTerol/ipratropium for Nebulization 3 milliLiter(s) Nebulizer every 4 hours  artificial  tears Solution 1 Drop(s) Both EYES three times a day  aspirin  chewable 81 milliGRAM(s) Oral daily  atorvastatin 40 milliGRAM(s) Oral at bedtime  docusate sodium 100 milliGRAM(s) Oral two times a day  heparin  Injectable 5000 Unit(s) SubCutaneous every 8 hours  metoprolol     tartrate 25 milliGRAM(s) Oral two times a day  polyethylene glycol 3350 17 Gram(s) Oral daily  senna 1 Tablet(s) Oral at bedtime    MEDICATIONS  (PRN):      Weight:  Daily     Daily     Weight Change:     Estimated energy needs:     Subjective:     Previous Nutrition Diagnosis:    Active [   ]  Resolved [   ]    If resolved, new PES:     Goal:    Recommendations:    Education:     Risk Level: High [   ] Moderate [   ] Low [   ] Admitting Diagnosis:   Patient is a 89y old  Female presented from nursing/rehab center where she was found to be altered, tachypneic and in respiratory distress, admitted to the CCU for CHF exacerbation and hypoxic hypercapnic respiratory failure c/b HCAP and Afib RVR.     PAST MEDICAL & SURGICAL HISTORY:  Hyperlipidemia  Hypothyroid  Hypertension  Atherosclerosis  S/P CABG x 3      Current Nutrition Order: DYS 2 Mechanical Soft-Thin liquids       PO Intake: Good (%) [   ]  Fair (50-75%) [  x ] Poor (<25%) [   ]    GI Issues: constipated    Pain: none    Skin Integrity: sacrum stage 1- pressure ulcer    Labs:   02-26    145  |  107  |  25<H>  ----------------------------<  95  4.7   |  30  |  0.79    Ca    8.6      26 Feb 2018 06:09  Phos  4.0     02-26  Mg     2.7     02-26      CAPILLARY BLOOD GLUCOSE          Medications:  MEDICATIONS  (STANDING):  ALBUTerol/ipratropium for Nebulization 3 milliLiter(s) Nebulizer every 4 hours  artificial  tears Solution 1 Drop(s) Both EYES three times a day  aspirin  chewable 81 milliGRAM(s) Oral daily  atorvastatin 40 milliGRAM(s) Oral at bedtime  docusate sodium 100 milliGRAM(s) Oral two times a day  heparin  Injectable 5000 Unit(s) SubCutaneous every 8 hours  metoprolol     tartrate 25 milliGRAM(s) Oral two times a day  polyethylene glycol 3350 17 Gram(s) Oral daily  senna 1 Tablet(s) Oral at bedtime    MEDICATIONS  (PRN):      Weight:   2/18: 57kg, 51.7kg    2/20: 48.4kg  Daily     Daily     Weight Change: continue to trend wt    Estimated energy needs: Needs per wt on 2/20: 48.4kg;  97% of IBW and adjusted per age and current status and pressure ulcer:  25-30kcal/ABW: 1210-1452kcal  protein: 1-1.2g/ABW: 48.4-58.1g  fluid: 30-35ml/ABW: 1452-1694ml      Subjective: pt tolerates current consistency; reports 50% po intake at meals; w/ no N/V; feels constipated; on bowel regimen; PT encouraged to increase po intake w/ meals; skin w/ pressure ulcer and w/ no edema.     Previous Nutrition Diagnosis: Inadequate oral intake R/T inability to meet needs po AEB pt's report of 50% po intake      Active [  X ]  Resolved [   ]        Goal: Pt to meet> 75% EER po    Recommendations:  -please continue to provide encouragement and assistance w/ meals  -provide pt food preferences  -update wt weekly    Education: increased nutritional needs and encouragement for increased po intake    Risk Level: High [  X ] Moderate [   ] Low [   ]

## 2018-02-27 NOTE — PROGRESS NOTE ADULT - SUBJECTIVE AND OBJECTIVE BOX
OVERNIGHT EVENTS: Patient retaining urine overnight, straight cath'd with >300cc output.    SUBJECTIVE: ur retention, str cat.  No cough/SOB    Vital Signs Last 12 Hrs  T(F): 98.1 (02-27-18 @ 12:32), Max: 98.2 (02-27-18 @ 05:20)  HR: 84 (02-27-18 @ 12:32) (66 - 99)  BP: 142/82 (02-27-18 @ 12:32) (139/82 - 142/82)  BP(mean): --  RR: 18 (02-27-18 @ 12:32) (17 - 18)  SpO2: 95% (02-27-18 @ 12:32) (95% - 97%)  I&O's Summary    26 Feb 2018 07:01  -  27 Feb 2018 07:00  --------------------------------------------------------  IN: 0 mL / OUT: 320 mL / NET: -320 mL    PHYSICAL EXAM:  Constitutional: NAD, comfortable in bed.  HEENT: NC/AT, PERRLA, EOMI, no conjunctival pallor or scleral icterus, MMM  Neck: Supple, no JVD  Respiratory: Normal rate, rhythm, depth, effort. CTAB. No w/r/r.   Cardiovascular: RRR, normal S1 and S2, no m/r/g.   Gastrointestinal: +BS, soft NTND, no guarding or rebound tenderness, no palpable masses   Extremities: wwp; no cyanosis, clubbing or edema.   Vascular: Pulses equal and strong throughout.   Neurological: AAOx3, no CN deficits, strength and sensation intact throughout.   Skin: No gross skin abnormalities or rashes    LABS:                        11.1   9.1   )-----------( 176      ( 26 Feb 2018 06:09 )             37.3     02-26    145  |  107  |  25<H>  ----------------------------<  95  4.7   |  30  |  0.79    Ca    8.6      26 Feb 2018 06:09  Phos  4.0     02-26  Mg     2.7     02-26    RADIOLOGY & ADDITIONAL TESTS:    MEDICATIONS  (STANDING):  ALBUTerol/ipratropium for Nebulization 3 milliLiter(s) Nebulizer every 4 hours  artificial  tears Solution 1 Drop(s) Both EYES three times a day  aspirin  chewable 81 milliGRAM(s) Oral daily  atorvastatin 40 milliGRAM(s) Oral at bedtime  docusate sodium 100 milliGRAM(s) Oral two times a day  heparin  Injectable 5000 Unit(s) SubCutaneous every 8 hours  metoprolol     tartrate 25 milliGRAM(s) Oral two times a day  polyethylene glycol 3350 17 Gram(s) Oral daily  senna 1 Tablet(s) Oral at bedtime    MEDICATIONS  (PRN):

## 2018-02-27 NOTE — PROGRESS NOTE ADULT - PROBLEM SELECTOR PROBLEM 10
Hypothyroid
Need for prophylactic measure

## 2018-02-27 NOTE — PROGRESS NOTE ADULT - PROBLEM SELECTOR PLAN 1
CTA PE protocol showed cardiomegaly and post MVR, right cardiac chamber  enlargement suggesting right-sided congestive heart failure.    -Echo demonstrating EF 50-55%, gross dilation  -Diuresed with PRN IV Lasix, will continue as needed   -Monitor I/O,   -increase metoprolol tartrate to 25 BID, continue to monitor HR  -will restart lisinopril 2.5mg daily tomorrow    #Pulmonary Hypertension  Likely class 2, no reported history of primary pulmonary disease   -70mmHg  -maintain euvolemic

## 2018-02-27 NOTE — PROGRESS NOTE ADULT - PROBLEM SELECTOR PLAN 4
Chronic. however risk of anticoagulation outweighs benefits given pt's age and functional status  -[id number] 5, family did not want anticoagulation  -Lopressor 25mg BID for rate control

## 2018-02-27 NOTE — PROGRESS NOTE ADULT - PROBLEM SELECTOR PROBLEM 1
Acute on chronic systolic congestive heart failure
Pleural effusion
Pleural effusion
Acute on chronic systolic congestive heart failure
Acute respiratory failure
Acute respiratory failure
Acute on chronic systolic congestive heart failure
Pleural effusion

## 2018-02-27 NOTE — PROGRESS NOTE ADULT - PROBLEM SELECTOR PLAN 4
Chronic. however risk of anticoagulation outweighs benefits given pt's age and functional status  -[id number] 5, family did not want anticoagulation  -Lopressor 25mg BID for rate control

## 2018-02-27 NOTE — PROGRESS NOTE ADULT - SUBJECTIVE AND OBJECTIVE BOX
Physical Medicine and Rehabilitation Progress Note:    Patient is a 89y old  Female who presents with a chief complaint of     HPI:  89 yo F with PMH CHF, HTN, HLD, hypothyroidism, osteoporosis PSHx triple bypass and mitral valve repair, presented from nursing/rehab center where she was found to be altered, tachypneic and in respiratory distress. In ED she was noted to be altered, tachypneic and tachycardic so she was placed on BIPAP. Vitals -126, /82, RR 17-27, SpO2 94% on BIPAP. CT head was negative for any acute pathology. EKG showed Aflutter with variable block, LAD, LVH with repolarization abnormality Labs were remarkable for Trop 0.02, pro-BNP 4552. VBG showed respiratory acidosis pH 7.23, pCO2 80, HCO3 33.  Bedside echo performed by cardio fellow showed EF 35-40%, LVH and hypokinetic RV. Chest X ray showed congestion and/or infiltrates with bilateral effusions. CTA PE protocol was ordered given hypoxia and signs of right heart failure. CTA was negative for PE, showed large bilateral pleural effusions and atelectasis of the bilateral lower lobes with patchy airspace opacities in the right upper lobe most likely representing pneumonia. Pt received 40 mg IV lasix and was transfered to CCU for further management. Upon arrival in the unit, the patient remained on BIPAP and altered, minimally responsive. (18 Feb 2018 16:13)                            11.1   9.1   )-----------( 176      ( 26 Feb 2018 06:09 )             37.3       02-26    145  |  107  |  25<H>  ----------------------------<  95  4.7   |  30  |  0.79    Ca    8.6      26 Feb 2018 06:09  Phos  4.0     02-26  Mg     2.7     02-26      Vital Signs Last 24 Hrs  T(C): 36.7 (27 Feb 2018 12:32), Max: 37.2 (26 Feb 2018 15:20)  T(F): 98.1 (27 Feb 2018 12:32), Max: 98.9 (26 Feb 2018 15:20)  HR: 84 (27 Feb 2018 12:32) (66 - 107)  BP: 142/82 (27 Feb 2018 12:32) (113/55 - 142/82)  BP(mean): 74 (26 Feb 2018 15:20) (74 - 74)  RR: 18 (27 Feb 2018 12:32) (16 - 18)  SpO2: 95% (27 Feb 2018 12:32) (94% - 97%)    MEDICATIONS  (STANDING):  ALBUTerol/ipratropium for Nebulization 3 milliLiter(s) Nebulizer every 4 hours  artificial  tears Solution 1 Drop(s) Both EYES three times a day  aspirin  chewable 81 milliGRAM(s) Oral daily  atorvastatin 40 milliGRAM(s) Oral at bedtime  docusate sodium 100 milliGRAM(s) Oral two times a day  heparin  Injectable 5000 Unit(s) SubCutaneous every 8 hours  metoprolol     tartrate 25 milliGRAM(s) Oral two times a day  polyethylene glycol 3350 17 Gram(s) Oral daily  senna 1 Tablet(s) Oral at bedtime    MEDICATIONS  (PRN):    Currently Undergoing Physical Therapy at bedside.    Functional Status Assessment: on 2/26/2018    Therapeutic Interventions      Bed Mobility  Bed Mobility Training Rolling/Turning: moderate assist (50% patient effort);  1 person assist;  bed rails  Bed Mobility Training Scooting: moderate assist (50% patient effort);  bed rails;  2 person assist  Bed Mobility Training Bridging: moderate assist (50% patient effort);  1 person assist  Bed Mobility Training Sit-to-Supine: moderate assist (50% patient effort);  2 person assist  Bed Mobility Training Supine-to-Sit: moderate assist (50% patient effort);  2 person assist  Bed Mobility Training Limitations: decreased ability to use legs for bridging/pushing;  decreased strength;  impaired balance;  decreased flexibility;  impaired postural control    Sit-Stand Transfer Training  Transfer Training Sit-to-Stand Transfer: moderate assist (50% patient effort);  2 person assist;  full weight-bearing   rolling walker;  Verbal cues for safe hand placement.   Transfer Training Stand-to-Sit Transfer: 2 person assist;  moderate assist (50% patient effort);  full weight-bearing   rolling walker;  Verbal cues for safe hand placement.   Sit-to-Stand Transfer Training Transfer Safety Analysis: decreased sequencing ability;  decreased weight-shifting ability;  decreased balance;  decreased flexibility;  decreased strength;  impaired balance;  impaired postural control;  rolling walker    Gait Training  Gait Training: moderate assist (50% patient effort);  maximum assist (25% patient effort);  2 person assist;  full weight-bearing   rolling walker;  6 marches in place; 6 lateral steps towards pt's left   Gait Analysis: 3-point gait   decreased lilia;  increased time in double stance;  decreased hip/knee flexion;  decreased step length;  decreased stride length;  increased stride width;  decreased weight-shifting ability;  severe posterior retropulsion;  decreased strength;  impaired balance;  impaired postural control;  decreased flexibility    Therapeutic Exercise  Therapeutic Exercise Detail: Ankle pumps, 2x10 each LE, to improve ankle DF ROM for midstance phase of gait and help prevent DVTs;heel slides with assist, 1x10 each LE, to improve knee ROM;supine bridges, 10 times, to improve glute strength for stance phase of gait;         PM&R Impression: as above    Disposition Plan Recommendations: subacute rehab placement, [facility]

## 2018-02-28 VITALS
DIASTOLIC BLOOD PRESSURE: 81 MMHG | OXYGEN SATURATION: 95 % | RESPIRATION RATE: 18 BRPM | TEMPERATURE: 98 F | SYSTOLIC BLOOD PRESSURE: 146 MMHG | HEART RATE: 82 BPM

## 2018-02-28 PROCEDURE — 83605 ASSAY OF LACTIC ACID: CPT

## 2018-02-28 PROCEDURE — 93005 ELECTROCARDIOGRAM TRACING: CPT

## 2018-02-28 PROCEDURE — 87581 M.PNEUMON DNA AMP PROBE: CPT

## 2018-02-28 PROCEDURE — 82553 CREATINE MB FRACTION: CPT

## 2018-02-28 PROCEDURE — 36415 COLL VENOUS BLD VENIPUNCTURE: CPT

## 2018-02-28 PROCEDURE — 85027 COMPLETE CBC AUTOMATED: CPT

## 2018-02-28 PROCEDURE — 85025 COMPLETE CBC W/AUTO DIFF WBC: CPT

## 2018-02-28 PROCEDURE — 93306 TTE W/DOPPLER COMPLETE: CPT

## 2018-02-28 PROCEDURE — 87633 RESP VIRUS 12-25 TARGETS: CPT

## 2018-02-28 PROCEDURE — 84443 ASSAY THYROID STIM HORMONE: CPT

## 2018-02-28 PROCEDURE — 94660 CPAP INITIATION&MGMT: CPT

## 2018-02-28 PROCEDURE — 87040 BLOOD CULTURE FOR BACTERIA: CPT

## 2018-02-28 PROCEDURE — 71275 CT ANGIOGRAPHY CHEST: CPT

## 2018-02-28 PROCEDURE — 87086 URINE CULTURE/COLONY COUNT: CPT

## 2018-02-28 PROCEDURE — 83880 ASSAY OF NATRIURETIC PEPTIDE: CPT

## 2018-02-28 PROCEDURE — 71045 X-RAY EXAM CHEST 1 VIEW: CPT

## 2018-02-28 PROCEDURE — 97162 PT EVAL MOD COMPLEX 30 MIN: CPT

## 2018-02-28 PROCEDURE — 99291 CRITICAL CARE FIRST HOUR: CPT | Mod: 25

## 2018-02-28 PROCEDURE — 80053 COMPREHEN METABOLIC PANEL: CPT

## 2018-02-28 PROCEDURE — 87798 DETECT AGENT NOS DNA AMP: CPT

## 2018-02-28 PROCEDURE — 96375 TX/PRO/DX INJ NEW DRUG ADDON: CPT

## 2018-02-28 PROCEDURE — 81001 URINALYSIS AUTO W/SCOPE: CPT

## 2018-02-28 PROCEDURE — 82962 GLUCOSE BLOOD TEST: CPT

## 2018-02-28 PROCEDURE — 85610 PROTHROMBIN TIME: CPT

## 2018-02-28 PROCEDURE — 84100 ASSAY OF PHOSPHORUS: CPT

## 2018-02-28 PROCEDURE — 97110 THERAPEUTIC EXERCISES: CPT

## 2018-02-28 PROCEDURE — 82550 ASSAY OF CK (CPK): CPT

## 2018-02-28 PROCEDURE — 83735 ASSAY OF MAGNESIUM: CPT

## 2018-02-28 PROCEDURE — 84484 ASSAY OF TROPONIN QUANT: CPT

## 2018-02-28 PROCEDURE — 97530 THERAPEUTIC ACTIVITIES: CPT

## 2018-02-28 PROCEDURE — 70450 CT HEAD/BRAIN W/O DYE: CPT

## 2018-02-28 PROCEDURE — 87486 CHLMYD PNEUM DNA AMP PROBE: CPT

## 2018-02-28 PROCEDURE — 85730 THROMBOPLASTIN TIME PARTIAL: CPT

## 2018-02-28 PROCEDURE — 82803 BLOOD GASES ANY COMBINATION: CPT

## 2018-02-28 PROCEDURE — 96374 THER/PROPH/DIAG INJ IV PUSH: CPT | Mod: XU

## 2018-02-28 PROCEDURE — 94640 AIRWAY INHALATION TREATMENT: CPT

## 2018-02-28 PROCEDURE — 80048 BASIC METABOLIC PNL TOTAL CA: CPT

## 2018-02-28 PROCEDURE — 92610 EVALUATE SWALLOWING FUNCTION: CPT | Mod: GN

## 2018-02-28 RX ORDER — TAMSULOSIN HYDROCHLORIDE 0.4 MG/1
1 CAPSULE ORAL
Qty: 0 | Refills: 0 | COMMUNITY
Start: 2018-02-28

## 2018-02-28 RX ORDER — METOPROLOL TARTRATE 50 MG
1 TABLET ORAL
Qty: 0 | Refills: 0 | COMMUNITY
Start: 2018-02-28

## 2018-02-28 RX ORDER — METOPROLOL TARTRATE 50 MG
12.5 TABLET ORAL
Qty: 0 | Refills: 0 | COMMUNITY

## 2018-02-28 RX ORDER — SENNA PLUS 8.6 MG/1
1 TABLET ORAL
Qty: 0 | Refills: 0 | COMMUNITY
Start: 2018-02-28

## 2018-02-28 RX ORDER — FUROSEMIDE 40 MG
1 TABLET ORAL
Qty: 0 | Refills: 0 | COMMUNITY

## 2018-02-28 RX ADMIN — Medication 3 MILLILITER(S): at 09:32

## 2018-02-28 RX ADMIN — Medication 3 MILLILITER(S): at 05:18

## 2018-02-28 RX ADMIN — Medication 25 MILLIGRAM(S): at 05:17

## 2018-02-28 RX ADMIN — Medication 1 DROP(S): at 05:16

## 2018-02-28 RX ADMIN — HEPARIN SODIUM 5000 UNIT(S): 5000 INJECTION INTRAVENOUS; SUBCUTANEOUS at 05:17

## 2018-02-28 RX ADMIN — Medication 1 DROP(S): at 13:02

## 2018-02-28 RX ADMIN — Medication 3 MILLILITER(S): at 02:00

## 2018-02-28 RX ADMIN — Medication 3 MILLILITER(S): at 13:01

## 2018-02-28 RX ADMIN — LISINOPRIL 2.5 MILLIGRAM(S): 2.5 TABLET ORAL at 05:17

## 2018-02-28 RX ADMIN — HEPARIN SODIUM 5000 UNIT(S): 5000 INJECTION INTRAVENOUS; SUBCUTANEOUS at 13:02

## 2018-02-28 RX ADMIN — Medication 100 MILLIGRAM(S): at 05:17

## 2018-02-28 RX ADMIN — Medication 81 MILLIGRAM(S): at 11:46

## 2018-02-28 NOTE — DISCHARGE NOTE ADULT - PATIENT PORTAL LINK FT
You can access the [facility] Patient Portal, offered by [facility], by registering with the following website: [email or web address]

## 2018-02-28 NOTE — DISCHARGE NOTE ADULT - CARE PROVIDER_API CALL
Bridger Coats), Internal Medicine  [address] [address]  Phone: [phone]  Fax: [phone]    Tom Whitney), Critical Care Medicine; Pulmonary Disease  [address]  [place] [address]  Phone: [phone]  Fax: [phone]

## 2018-02-28 NOTE — DISCHARGE NOTE ADULT - PLAN OF CARE
Resolution of your congestive heart failure symptoms You were previously diagnosed with congestive heart failure. You presented in an acute exacerbation. This means that your heart has difficulty pumping blood at a normal level.  Your medications were held due to your infection, but were restarted after you clinically improved. Please continue to take your medications as prescribed and follow up with your PCP and cardiologist for further management. You were diagnosed with pneumonia on this visit. You were treated with antibiotics and began to show improvement. Please follow up with your PMD to check for full resolution of this problem. You were previously diagnosed with atrial fibrillation. This is an abnormal heart beat that, if left uncontrolled, can cause symptoms like chest pain and shortness of breath. Please follow up with your PMD for further management of your atrial fibrillation. You were diagnosed with urinary retention. It was likely complicated by constipation. Your constipation has resolved and you were started on Flomax to help prevent retention. Please continue to take your medications as prescribed and follow up with your PCP for trials of void.

## 2018-02-28 NOTE — DISCHARGE NOTE ADULT - MEDICATION SUMMARY - MEDICATIONS TO TAKE
I will START or STAY ON the medications listed below when I get home from the hospital:    Aspirin Enteric Coated 81 mg oral delayed release tablet  -- 1 tab(s) by mouth once a day  -- Indication: For CAD (coronary artery disease)    lisinopril 2.5 mg oral tablet  -- 1 tab(s) by mouth once a day  -- Indication: For CHF    tamsulosin 0.4 mg oral capsule  -- 1 cap(s) by mouth once a day (at bedtime)  -- Indication: For urinary retention    atorvastatin 40 mg oral tablet  -- 1 tab(s) by mouth once a day  -- Indication: For Hld    metoprolol tartrate 25 mg oral tablet  -- 1 tab(s) by mouth 2 times a day  -- Indication: For CHF    senna oral tablet  -- 1 tab(s) by mouth once a day (at bedtime)  -- Indication: For Constipation    MiraLax oral powder for reconstitution  -- 17 gram(s) by mouth once a day  -- Indication: For Constipation    docusate sodium 100 mg oral tablet  -- 300 milligram(s) by mouth once a day (at bedtime)  -- Indication: For Constipation    Melatonin 3 mg oral tablet  -- 1 tab(s) by mouth once (at bedtime)  -- Indication: For insomnia    Artificial Tears ophthalmic solution  -- 1 drop(s) to each affected eye 3 times a day  -- Indication: For Dry eye    Vitamin D3 400 intl units oral tablet  -- 2 tab(s) by mouth once a day  -- Indication: For Nutritional

## 2018-02-28 NOTE — DISCHARGE NOTE ADULT - ADDITIONAL INSTRUCTIONS
Please follow up with Dr. Coats within 1-2 weeks of discharge.     Please follow up with Dr. Whitney within 1-2 weeks of discharge.

## 2018-02-28 NOTE — PROGRESS NOTE ADULT - PROVIDER SPECIALTY LIST ADULT
CCU
CCU
Internal Medicine
Palliative Care
Palliative Care
Pulmonology
Rehab Medicine
Pulmonology
Pulmonology
Rehab Medicine
Internal Medicine
Pulmonology
Internal Medicine

## 2018-02-28 NOTE — DISCHARGE NOTE ADULT - HOSPITAL COURSE
Patient is a  89 year-old F with a PMH of CAD (s/p 3-vessel CABG), MV repair, sCHF, HTN, HLD, hypothyroidism, and osteoporosis who presented from nursing/rehab center where she was found to be altered, tachypneic and in respiratory distress, admitted to the CCU for CHF exacerbation and hypoxic hypercapnic respiratory failure. CTA PE protocol was ordered given hypoxia and signs of right heart failure. CTA was negative for PE.  In the CCU, she was diuresed  to a goal of 2 to 3 L net negative per day, which was achieved.  Completed an antibiotic course for HAP (Vanco/Zosyn-->Augmentin.) Her mental status improved to baseline. She was weaned from BiPAP and now on room air.  She no longer needed additional diuresis. Patient was seen by palliative care- she is DNR/DNI with no escalation of care.

## 2018-02-28 NOTE — PROGRESS NOTE ADULT - SUBJECTIVE AND OBJECTIVE BOX
CC/ HPI Patient is a 89 year old female with CAD (s/p 3-vessel CABG), MV repair, sCHF, HTN, admitted from rehab center where she was found to be altered, tachypneic and in respiratory distress, admitted to the CCU for CHF exacerbation complicated by hypoxic hypercapnic respiratory failure, atrial fibrillation with RVR, pneumonia, seen this morning the patient denies respiratory complaint.    PAST MEDICAL & SURGICAL HISTORY:  Hyperlipidemia  Hypothyroid  Hypertension  Atherosclerosis  S/P CABG x 3    SOCHX:  -  alcohol    FMHX: FA/MO  - contributory     ROS reviewed below with positive findings marked (+) :  GEN:  fever, chills ENT: tracheostomy,   epistaxis,  sinusitis COR: CAD, CHF,  HTN, dysrhythmia PUL: COPD, ILD, asthma, pneumonia GI: PEG, dysphagia, hemorrhage, other KATELYN: kidney disease, electrolyte disorder HEM:  anemia, thrombus, coagulopathy, cancer ENDO:  thyroid disease, diabetes mellitus CNS:  dementia, stroke, seizure, PSY:  depression, anxiety, other      MEDICATIONS  (STANDING):  ALBUTerol/ipratropium for Nebulization 3 milliLiter(s) Nebulizer every 4 hours  artificial  tears Solution 1 Drop(s) Both EYES three times a day  aspirin  chewable 81 milliGRAM(s) Oral daily  atorvastatin 40 milliGRAM(s) Oral at bedtime  docusate sodium 100 milliGRAM(s) Oral two times a day  heparin  Injectable 5000 Unit(s) SubCutaneous every 8 hours  lisinopril 2.5 milliGRAM(s) Oral daily  metoprolol     tartrate 25 milliGRAM(s) Oral two times a day  senna 1 Tablet(s) Oral at bedtime  tamsulosin 0.4 milliGRAM(s) Oral at bedtime    MEDICATIONS  (PRN):      Vital Signs Last 24 Hrs  T(C): 36.7 (28 Feb 2018 15:10), Max: 36.7 (28 Feb 2018 15:10)  T(F): 98 (28 Feb 2018 15:10), Max: 98 (28 Feb 2018 15:10)  HR: 82 (28 Feb 2018 15:10) (76 - 97)  BP: 146/81 (28 Feb 2018 15:10) (124/71 - 146/84)  RR: 18 (28 Feb 2018 15:10) (14 - 18)  SpO2: 95% (28 Feb 2018 15:10) (93% - 96%)    GENERAL:         comfortable,  - distress.  HEENT:            - trauma,  - icterus,  - injection,  - nasal discharge.  NECK:              - jugular venous distention, - thyromegaly.  LYMPH:           - lymphadenopathy, - masses.  RESP:              + crackles,   - rhonchi,   - wheezes.   COR:                S1S2   - gallops,  - rubs.  ABD:                bowel sounds,   soft, - tender, - distended, - organomegaly.  EXT/MSC:         - cyanosis,  - clubbing,  - edema.    NEURO:             alert,   responds to stimuli.      CT Angio Chest PE Protocol (02.18.18)  No acute or chronic pulmonary embolism.  2. Large bilateral pleural effusions. Atelectasis of the bilateral lower lobes. Patchy airspace opacities in the right upper lobe most likely representing pneumonia.      ASSESSMENT/PLAN    1) Congestive heart failure  2) Status post pneumonia/atelectasis  3) Pulmonary hypertension    SpO2 satisfactory   Bronchodilators:  Atrovent/ albuterol q 4 – 6 hours as needed  ID/Antibiotics: status post Zosyn  Cardiac/HTN: diuresis as needed  GI: Rx/ prophylaxis c PPI/H2B  Heme: Rx/VT prophylaxis  Discussed with medical team.

## 2018-02-28 NOTE — DISCHARGE NOTE ADULT - MEDICATION SUMMARY - MEDICATIONS TO CHANGE
I will SWITCH the dose or number of times a day I take the medications listed below when I get home from the hospital:    metoprolol tartrate  -- 12.5 milligram(s) by mouth 2 times a day

## 2018-02-28 NOTE — DISCHARGE NOTE ADULT - CARE PLAN
Principal Discharge DX:	Acute on chronic systolic congestive heart failure  Secondary Diagnosis:	Pneumonia  Secondary Diagnosis:	Afib  Secondary Diagnosis:	Urinary retention Principal Discharge DX:	Acute on chronic systolic congestive heart failure  Goal:	Resolution of your congestive heart failure symptoms  Assessment and plan of treatment:	You were previously diagnosed with congestive heart failure. You presented in an acute exacerbation. This means that your heart has difficulty pumping blood at a normal level.  Your medications were held due to your infection, but were restarted after you clinically improved. Please continue to take your medications as prescribed and follow up with your PCP and cardiologist for further management.  Secondary Diagnosis:	Pneumonia  Assessment and plan of treatment:	You were diagnosed with pneumonia on this visit. You were treated with antibiotics and began to show improvement. Please follow up with your PMD to check for full resolution of this problem.  Secondary Diagnosis:	Afib  Assessment and plan of treatment:	You were previously diagnosed with atrial fibrillation. This is an abnormal heart beat that, if left uncontrolled, can cause symptoms like chest pain and shortness of breath. Please follow up with your PMD for further management of your atrial fibrillation.  Secondary Diagnosis:	Urinary retention  Assessment and plan of treatment:	You were diagnosed with urinary retention. It was likely complicated by constipation. Your constipation has resolved and you were started on Flomax to help prevent retention. Please continue to take your medications as prescribed and follow up with your PCP for trials of void.

## 2018-03-05 DIAGNOSIS — J96.01 ACUTE RESPIRATORY FAILURE WITH HYPOXIA: ICD-10-CM

## 2018-03-05 DIAGNOSIS — Z66 DO NOT RESUSCITATE: ICD-10-CM

## 2018-03-05 DIAGNOSIS — J18.9 PNEUMONIA, UNSPECIFIED ORGANISM: ICD-10-CM

## 2018-03-05 DIAGNOSIS — E78.5 HYPERLIPIDEMIA, UNSPECIFIED: ICD-10-CM

## 2018-03-05 DIAGNOSIS — I50.43 ACUTE ON CHRONIC COMBINED SYSTOLIC (CONGESTIVE) AND DIASTOLIC (CONGESTIVE) HEART FAILURE: ICD-10-CM

## 2018-03-05 DIAGNOSIS — J96.02 ACUTE RESPIRATORY FAILURE WITH HYPERCAPNIA: ICD-10-CM

## 2018-03-05 DIAGNOSIS — Z95.1 PRESENCE OF AORTOCORONARY BYPASS GRAFT: ICD-10-CM

## 2018-03-05 DIAGNOSIS — R06.02 SHORTNESS OF BREATH: ICD-10-CM

## 2018-03-05 DIAGNOSIS — M81.0 AGE-RELATED OSTEOPOROSIS WITHOUT CURRENT PATHOLOGICAL FRACTURE: ICD-10-CM

## 2018-03-05 DIAGNOSIS — I25.10 ATHEROSCLEROTIC HEART DISEASE OF NATIVE CORONARY ARTERY WITHOUT ANGINA PECTORIS: ICD-10-CM

## 2018-03-05 DIAGNOSIS — R33.9 RETENTION OF URINE, UNSPECIFIED: ICD-10-CM

## 2018-03-05 DIAGNOSIS — I48.92 UNSPECIFIED ATRIAL FLUTTER: ICD-10-CM

## 2018-03-05 DIAGNOSIS — E03.9 HYPOTHYROIDISM, UNSPECIFIED: ICD-10-CM

## 2018-03-05 DIAGNOSIS — I11.0 HYPERTENSIVE HEART DISEASE WITH HEART FAILURE: ICD-10-CM

## 2018-07-09 NOTE — PROGRESS NOTE ADULT - ASSESSMENT
Problem: Wound:  Intervention: Assess pain status  See flow sheet. Intervention: Assess wound size, appearance and drainage  See flow sheet. Intervention: Assess pedal pulses bilaterally if patient has a foot or leg ulcer  See flow sheet. Intervention: Doppler if unable to palpate pedal pulse  See flow sheet. 89F with a PMH of CAD (s/p 3-vessel CABG), MV repair, sCHF, HTN, HLD, hypothyroidism, and osteoporosis who presented from nursing/rehab center where she was found to be altered, tachypneic and in respiratory distress, admitted to the CCU for CHF exacerbation and hypoxic hypercapnic respiratory failure c/b HCAP and Afib RVR

## 2019-02-03 NOTE — PATIENT PROFILE ADULT. - VISION (WITH CORRECTIVE LENSES IF THE PATIENT USUALLY WEARS THEM):
Partially impaired: cannot see medication labels or newsprint, but can see obstacles in path, and the surrounding layout; can count fingers at arm's length normal...

## 2019-07-31 NOTE — PROGRESS NOTE ADULT - PROBLEM SELECTOR PROBLEM 7
[facility] Call Center    Phone Message    May a detailed message be left on voicemail: yes    Reason for Call: Symptoms or Concerns     Patient has been experiencing mouth sores since 7/26. She has seen her PCP in [place] and was prescribed medication/Carasate Sucralfate (sp?) and lidocaine hci but its not helping. Made an appt with MG on 8/14 but does not want to wait that long.    Please call patient to advise.       Action Taken: Message routed to:  Adult Clinics: ENT p [phone]   Hyperlipidemia

## 2019-08-11 NOTE — PROGRESS NOTE ADULT - ASSESSMENT
Ms. Sosa is a 89 year-old F with a PMH of CAD (s/p 3-vessel CABG), MV repair, sCHF, HTN, HLD, hypothyroidism, and osteoporosis who presented from nursing/rehab center where she was found to be altered, tachypneic and in respiratory distress, admitted to the CCU for CHF exacerbation and hypoxic hypercapnic respiratory failure. Patient requests all Lab and Radiology Results on their Discharge Instructions

## 2020-06-10 NOTE — PROGRESS NOTE ADULT - PROBLEM SELECTOR PROBLEM 5
[Normal?] : normal pregnancy [Non-Contributory] : Non-contributory [Duration: ___ wks] : duration: [unfilled] weeks [___ lbs.] : [unfilled] lbs Shortness of breath

## 2021-04-21 NOTE — PROGRESS NOTE ADULT - SUBJECTIVE AND OBJECTIVE BOX
OVERNIGHT EVENTS:    SUBJECTIVE / INTERVAL HPI: Patient seen and examined at bedside.     VITAL SIGNS:  Vital Signs Last 24 Hrs  T(C): 36.8 (19 Feb 2018 05:59), Max: 37.3 (18 Feb 2018 11:00)  T(F): 98.2 (19 Feb 2018 05:59), Max: 99.2 (18 Feb 2018 11:00)  HR: 104 (19 Feb 2018 06:00) (82 - 140)  BP: 96/49 (19 Feb 2018 06:00) (73/40 - 139/69)  BP(mean): 66 (19 Feb 2018 06:00) (55 - 96)  RR: 15 (19 Feb 2018 06:00) (12 - 32)  SpO2: 100% (19 Feb 2018 06:00) (84% - 100%)    PHYSICAL EXAM:    General: WDWN  HEENT: NC/AT; PERRL, anicteric sclera; MMM  Neck: supple  Cardiovascular: +S1/S2; RRR  Respiratory: CTA B/L; no W/R/R  Gastrointestinal: soft, NT/ND; +BSx4  Extremities: WWP; no edema, clubbing or cyanosis  Vascular: 2+ radial, DP/PT pulses B/L  Neurological: AAOx3; no focal deficits    MEDICATIONS:  MEDICATIONS  (STANDING):  ALBUTerol/ipratropium for Nebulization 3 milliLiter(s) Nebulizer every 4 hours  artificial  tears Solution 1 Drop(s) Both EYES three times a day  aspirin Suppository 300 milliGRAM(s) Rectal daily  atorvastatin 40 milliGRAM(s) Oral at bedtime  heparin  Injectable 5000 Unit(s) SubCutaneous every 8 hours  piperacillin/tazobactam IVPB. 3.375 Gram(s) IV Intermittent every 6 hours  vancomycin  IVPB 750 milliGRAM(s) IV Intermittent every 24 hours    MEDICATIONS  (PRN):      ALLERGIES:  Allergies    aminocaproic acid (Unknown)    Intolerances        LABS:                        13.2   10.1  )-----------( 278      ( 18 Feb 2018 11:08 )             43.6     02-18    141  |  102  |  38<H>  ----------------------------<  117<H>  4.9   |  30  |  1.10    Ca    8.7      18 Feb 2018 11:08    TPro  6.2  /  Alb  3.4  /  TBili  0.4  /  DBili  x   /  AST  11  /  ALT  30  /  AlkPhos  94  02-18    PT/INR - ( 18 Feb 2018 11:08 )   PT: 11.5 sec;   INR: 1.04          PTT - ( 18 Feb 2018 11:08 )  PTT:28.8 sec  Urinalysis Basic - ( 18 Feb 2018 11:12 )    Color: Yellow / Appearance: Clear / SG: >=1.030 / pH: x  Gluc: x / Ketone: NEGATIVE  / Bili: Negative / Urobili: 0.2 E.U./dL   Blood: x / Protein: 100 mg/dL / Nitrite: NEGATIVE   Leuk Esterase: NEGATIVE / RBC: < 5 /HPF / WBC < 5 /HPF   Sq Epi: x / Non Sq Epi: 0-5 /HPF / Bacteria: Present /HPF      CAPILLARY BLOOD GLUCOSE      POCT Blood Glucose.: 91 mg/dL (18 Feb 2018 20:20) Include Location In Plan?: No Detail Level: Zone OVERNIGHT EVENTS: UOP fell to 10-20 cc/hr, overnight team gave dose of lasix 40 mg IV, after which UOP increased significantly.  Goals of clarified (see resident chart note).      SUBJECTIVE / INTERVAL HPI: Patient seen and examined at bedside.  She is very sleepy this morning.  She arouses to loud verbal stimuli but does not meaningfully respond to questioning.      VITAL SIGNS:  Vital Signs Last 24 Hrs  T(C): 36.8 (19 Feb 2018 05:59), Max: 37.3 (18 Feb 2018 11:00)  T(F): 98.2 (19 Feb 2018 05:59), Max: 99.2 (18 Feb 2018 11:00)  HR: 104 (19 Feb 2018 06:00) (82 - 140)  BP: 96/49 (19 Feb 2018 06:00) (73/40 - 139/69)  BP(mean): 66 (19 Feb 2018 06:00) (55 - 96)  RR: 15 (19 Feb 2018 06:00) (12 - 32)  SpO2: 100% (19 Feb 2018 06:00) (84% - 100%)    PHYSICAL EXAM:    General: WDWN, very sleepy but arousable to loud verbal stimuli, NAD, on BiPAP  HEENT: NC/AT; anicteric sclera; MMM  Neck: supple, no JVD   Cardiovascular: irregularly irregular rate and rhythm, no murmurs, gallops, rubs   Respiratory: on BiPAP, bilateral crackles present, no wheezes or rhonchi   Gastrointestinal: +BS, soft, nontender, nondistended   Extremities: WWP; no edema  Vascular: 2+ radial  Neurological: Very drowsy and unable to answer questions, infrequently follows loud verbal commands     MEDICATIONS:  MEDICATIONS  (STANDING):  ALBUTerol/ipratropium for Nebulization 3 milliLiter(s) Nebulizer every 4 hours  artificial  tears Solution 1 Drop(s) Both EYES three times a day  aspirin Suppository 300 milliGRAM(s) Rectal daily  atorvastatin 40 milliGRAM(s) Oral at bedtime  heparin  Injectable 5000 Unit(s) SubCutaneous every 8 hours  piperacillin/tazobactam IVPB. 3.375 Gram(s) IV Intermittent every 6 hours  vancomycin  IVPB 750 milliGRAM(s) IV Intermittent every 24 hours    MEDICATIONS  (PRN):      ALLERGIES:  Allergies    aminocaproic acid (Unknown)    Intolerances        LABS:                        13.2   10.1  )-----------( 278      ( 18 Feb 2018 11:08 )             43.6     02-18    141  |  102  |  38<H>  ----------------------------<  117<H>  4.9   |  30  |  1.10    Ca    8.7      18 Feb 2018 11:08    TPro  6.2  /  Alb  3.4  /  TBili  0.4  /  DBili  x   /  AST  11  /  ALT  30  /  AlkPhos  94  02-18    PT/INR - ( 18 Feb 2018 11:08 )   PT: 11.5 sec;   INR: 1.04          PTT - ( 18 Feb 2018 11:08 )  PTT:28.8 sec  Urinalysis Basic - ( 18 Feb 2018 11:12 )    Color: Yellow / Appearance: Clear / SG: >=1.030 / pH: x  Gluc: x / Ketone: NEGATIVE  / Bili: Negative / Urobili: 0.2 E.U./dL   Blood: x / Protein: 100 mg/dL / Nitrite: NEGATIVE   Leuk Esterase: NEGATIVE / RBC: < 5 /HPF / WBC < 5 /HPF   Sq Epi: x / Non Sq Epi: 0-5 /HPF / Bacteria: Present /HPF      CAPILLARY BLOOD GLUCOSE      POCT Blood Glucose.: 91 mg/dL (18 Feb 2018 20:20)      EXAM:  ECHOCARDIOGRAM (CARDIOL)                          PROCEDURE DATE:  02/19/2018    Interpretation Summary  Left ventricular hypertrophy presentAbnormal (paradoxical) septal motion   consistent with RV volume overloadThe left ventricular ejection fraction   is estimated to be 50-55%The mitral inflow pattern is consistent with   restrictive left ventricular filling, suggestive of severely elevated left atrial   pressure (>20mmHg).  The left atrium is dilated.The right atrium is dilated.The   right ventricle is moderately dilated.The right ventricular systolic function   is mildly reduced.Structurally normal aortic valve.No aortic regurgitation   noted.An annuloplasty ring is noted in the mitral position.There is mild   mitral valve thickening.There is trace mitral regurgitation.The mean   pressure gradient is 6 mmHg.Structurally normal tricuspid valve.There is mild   tricuspid regurgitation.The pulmonary artery systolic pressure is estimated to be   at least 70 mmHg.There is severe pulmonary hypertension.The IVC is dilated   (>2.1 cm) with an abnormal inspiratory collapse (<50%) consistent with elevated   right atrial pressure.  There is no pericardial effusion.Bilateral pleural   effusion noted. HOSPITAL COURSE:  Ms. Sosa is a 89 year-old F with a PMH of CAD (s/p 3-vessel CABG), MV repair, sCHF, HTN, HLD, hypothyroidism, and osteoporosis who presented from nursing/rehab center where she was found to be altered, tachypneic and in respiratory distress. She was placed on BIPAP. Vitals -126, /82, RR 17-27, SpO2 94% on BIPAP. CT head was negative for any acute pathology. EKG showed Aflutter with variable block, LAD, LVH with repolarization abnormality Labs were remarkable for Trop 0.02, pro-BNP 4552. VBG showed respiratory acidosis pH 7.23, pCO2 80, HCO3 33.  Bedside echo performed by cardio fellow showed EF 35-40%, LVH and hypokinetic RV. Chest X ray showed congestion and/or infiltrates with bilateral effusions. CTA PE protocol was ordered given hypoxia and signs of right heart failure. CTA was negative for PE, showed large bilateral pleural effusions and atelectasis of the bilateral lower lobes with patchy airspace opacities in the right upper lobe most likely representing pneumonia. Pt received 40 mg IV lasix and was transfered to CCU for further management. Upon arrival in the unit, the patient remained on BIPAP and altered, minimally responsive.      OVERNIGHT EVENTS: UOP fell to 10-20 cc/hr, overnight team gave dose of lasix 40 mg IV, after which UOP increased significantly.  Goals of clarified (see resident chart note).      SUBJECTIVE / INTERVAL HPI: Patient seen and examined at bedside.  She is very sleepy this morning.  She arouses to loud verbal stimuli but does not meaningfully respond to questioning.      VITAL SIGNS:  Vital Signs Last 24 Hrs  T(C): 36.8 (19 Feb 2018 05:59), Max: 37.3 (18 Feb 2018 11:00)  T(F): 98.2 (19 Feb 2018 05:59), Max: 99.2 (18 Feb 2018 11:00)  HR: 104 (19 Feb 2018 06:00) (82 - 140)  BP: 96/49 (19 Feb 2018 06:00) (73/40 - 139/69)  BP(mean): 66 (19 Feb 2018 06:00) (55 - 96)  RR: 15 (19 Feb 2018 06:00) (12 - 32)  SpO2: 100% (19 Feb 2018 06:00) (84% - 100%)    PHYSICAL EXAM:    General: WDWN, very sleepy but arousable to loud verbal stimuli, NAD, on BiPAP  HEENT: NC/AT; anicteric sclera; MMM  Neck: supple, no JVD   Cardiovascular: irregularly irregular rate and rhythm, no murmurs, gallops, rubs   Respiratory: on BiPAP, bilateral crackles present, no wheezes or rhonchi   Gastrointestinal: +BS, soft, nontender, nondistended   Extremities: WWP; no edema  Vascular: 2+ radial  Neurological: Very drowsy and unable to answer questions, infrequently follows loud verbal commands     MEDICATIONS:  MEDICATIONS  (STANDING):  ALBUTerol/ipratropium for Nebulization 3 milliLiter(s) Nebulizer every 4 hours  artificial  tears Solution 1 Drop(s) Both EYES three times a day  aspirin Suppository 300 milliGRAM(s) Rectal daily  atorvastatin 40 milliGRAM(s) Oral at bedtime  heparin  Injectable 5000 Unit(s) SubCutaneous every 8 hours  piperacillin/tazobactam IVPB. 3.375 Gram(s) IV Intermittent every 6 hours  vancomycin  IVPB 750 milliGRAM(s) IV Intermittent every 24 hours    MEDICATIONS  (PRN):      ALLERGIES:  Allergies    aminocaproic acid (Unknown)    Intolerances        LABS:                        13.2   10.1  )-----------( 278      ( 18 Feb 2018 11:08 )             43.6     02-18    141  |  102  |  38<H>  ----------------------------<  117<H>  4.9   |  30  |  1.10    Ca    8.7      18 Feb 2018 11:08    TPro  6.2  /  Alb  3.4  /  TBili  0.4  /  DBili  x   /  AST  11  /  ALT  30  /  AlkPhos  94  02-18    PT/INR - ( 18 Feb 2018 11:08 )   PT: 11.5 sec;   INR: 1.04          PTT - ( 18 Feb 2018 11:08 )  PTT:28.8 sec  Urinalysis Basic - ( 18 Feb 2018 11:12 )    Color: Yellow / Appearance: Clear / SG: >=1.030 / pH: x  Gluc: x / Ketone: NEGATIVE  / Bili: Negative / Urobili: 0.2 E.U./dL   Blood: x / Protein: 100 mg/dL / Nitrite: NEGATIVE   Leuk Esterase: NEGATIVE / RBC: < 5 /HPF / WBC < 5 /HPF   Sq Epi: x / Non Sq Epi: 0-5 /HPF / Bacteria: Present /HPF      CAPILLARY BLOOD GLUCOSE      POCT Blood Glucose.: 91 mg/dL (18 Feb 2018 20:20)      EXAM:  ECHOCARDIOGRAM (CARDIOL)                          PROCEDURE DATE:  02/19/2018    Interpretation Summary  Left ventricular hypertrophy presentAbnormal (paradoxical) septal motion   consistent with RV volume overloadThe left ventricular ejection fraction   is estimated to be 50-55%The mitral inflow pattern is consistent with   restrictive left ventricular filling, suggestive of severely elevated left atrial   pressure (>20mmHg).  The left atrium is dilated.The right atrium is dilated.The   right ventricle is moderately dilated.The right ventricular systolic function   is mildly reduced.Structurally normal aortic valve.No aortic regurgitation   noted.An annuloplasty ring is noted in the mitral position.There is mild   mitral valve thickening.There is trace mitral regurgitation.The mean   pressure gradient is 6 mmHg.Structurally normal tricuspid valve.There is mild   tricuspid regurgitation.The pulmonary artery systolic pressure is estimated to be   at least 70 mmHg.There is severe pulmonary hypertension.The IVC is dilated   (>2.1 cm) with an abnormal inspiratory collapse (<50%) consistent with elevated   right atrial pressure.  There is no pericardial effusion.Bilateral pleural   effusion noted.

## 2022-04-01 NOTE — PROGRESS NOTE ADULT - PROBLEM SELECTOR PROBLEM 4
Patient did not see PP message.    Please inform of following:  \"Your most recent Glycohemoglobin A1C is elevated at 8.1%.   Let our clinic know if blood sugars are consistently above 250 mg/dL or if you would like to be seen in endocrine clinic.\"     Thanks,  Joy Greene PA-C  3/29/2022 [facility]

## 2022-09-06 NOTE — PATIENT PROFILE ADULT. - NS PRO OT REFERRAL QUES 2 YN
Attempted to call patient to schedule appt with Dr. Alex. Phone call went straight to voicemail and could not leave a message. Pt. Has previously seen Dr. Case. Plan to ask if patient would like to establish care with Dr. Alex and potentially proceed with surgery.    yes

## 2022-10-15 NOTE — PROGRESS NOTE ADULT - ASSESSMENT
ASSESSMENT/PLAN    1) Congestive heart failure  2) Pneumonia/atelectasis  3) Pulmonary hypertension  4) Status post respiratory failure      Oxygen as needed for satisfactory SpO2 to avoid hypercapnia  Bronchodilators:  Atrovent/ albuterol q 4 – 6 hours as needed  ID/Antibiotics: status post Zosyn, now Augmentin  Cardiac/HTN: diuresis as needed  GI: Rx/ prophylaxis c PPI/H2B  Heme: Rx/VT prophylaxis heparin sq  Aspiration precautions at all times  Discussed with medical team. ankle sprain

## 2023-06-06 NOTE — SWALLOW BEDSIDE ASSESSMENT ADULT - SWALLOW EVAL: ORAL MUSCULATURE
generally intact
62 yo F denies significant PMHx presents with CC of abdominal pain.  Symptoms started on 6/1.  C/o lower abdominal pain, one episode of dysuria, and bilateral low back pain.  Denies fever, chills, nausea, vomiting, diarrhea, constipation, vaginal bleeding, hematuria, or any other symptoms.  Patient was seen at an urgent care on 6/1, started on Macrobid and Azo.  States her urine turned orange with the Azo, and that her symptoms have persisted.  She saw her OB/Gyn today who dipped her urine, and evaluated culture from urgent care which apparently was negative for growth.  She was sent to the ED for further evaluation and managmeent.

## 2024-01-11 NOTE — PROGRESS NOTE ADULT - PROBLEM SELECTOR PLAN 3
Encounter addended by: Puneet Segura on: 1/11/2024 12:39 PM   Actions taken: Charge Capture section accepted Pt meeting sepsis criteria on admission (tachypnea, tachycardia and suspected PNA on CT chest). CT chest with patchy airspace opacities in the right upper lobe most likely representing pneumonia.  --Will cover for HAP with Vanc 750 mg Q24hr and zosyn 3.375 g IV Q6  -Next Vanc trough 2/21 at noon  -Will check RVP  -UA negative Pt meeting sepsis criteria on admission (tachypnea, tachycardia and suspected PNA on CT chest). CT chest with patchy airspace opacities in the right upper lobe possibly representing pneumonia.  RVP negative.  -- Covering for HAP with Vanc 750 mg Q24hr and zosyn 3.375 g IV Q6  -- Next vanc trough 2/21 at noon

## 2024-01-23 NOTE — DISCHARGE NOTE ADULT - NS AS DC FU [facility] EJECTION FRACTION >40 YES
Outreach attempt was made to schedule a Medicare Wellness Visit. This was the first attempt. Contact was not made, no answer/busy.    normal LV function

## 2024-05-06 NOTE — PROGRESS NOTE ADULT - PROBLEM SELECTOR PLAN 1
[facility] Nursing Notes     HD today x 3hrs per Dr SAPNA Ortiz  Initiated at 1300 and ended 1600     Pre HD assessment     Received patient irritable and restless on bed.   Answered back questions by yes and no   No sob, no chest pain  Lungs Clear. Vital Signs stable  No complains pre HD.     Edema - no edema     Access: -Right IJ catheter, Non tunneled     No s/s of infection: no redness, no tenderness, no pus, no oozing of blood, warm to touch.     Intra HD    No issues during HD  Vital signs stable  No complaints        Post HD     UF goal  achieved: 1500mLs   - 500 mLs (200mls prime + 300mls rinseback)       Target Net UF : 1000mls     Completed HD tolerated well  Post vital signs stable  Nil complaints  Dressing: dry and intact     Documented by  KARO ALFARO RN    Report given to PCN Maria Alejandra Mcgrath RN        Improved, Likely multifactorial, PNA + Heart Failure and pulmonary edema. Bilateral pleural effusions seen on CXR. Currently on oxygen. Requiring BIPAP occasionally. Does not show any signs of distress/tachypnea, patient denies any SOB.

## 2025-07-14 NOTE — ED ADULT NURSE NOTE - PT NEEDS ASSIST
Hospital Medicine Discharge Summary      Patient Identification:   Lulu Werner   : 1936  MRN: [id number]   Account: [id number]      Patient's PCP: No primary care provider on file.    Admit Date: 2025     Discharge Date: 2025      Admitting Physician: No admitting provider for patient encounter.     Discharging Physician Assistant: Yessi Anand PA-C     Discharge Diagnoses with Assessment/Plan:    Acute exacerbation of HFpEF with acute hypoxic respiratory failure-resolved  Placed on 2 L nasal cannula secondary to tachypnea and hypoxia.  Weaned to room air  Secondary to fluid bolus during angiogram on day of admission along with missing Bumex doses at home  CXR with increased interstitial markings  IV Bumex therapy.  Transition to oral Bumex home dosing   Fluid and salt restrictions, I's and O's, daily standing weights.  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 Sequela of expressive aphasia occasionally.  Had angiogram done 2025 with Dr. Floyd for evaluation.   Follow up with neurology OP after D/C       Stage 3a chronic kidney disease  Creatinine remains at baseline  Avoid nephrotoxic agents as possible.  Small increase in creatinine to be expected due to contrast.       Primary hypertension  Managed at home with diltiazem, metoprolol, and Bumex  Hydralazine as needed       Chronic Problems (Reviewed and stable unless noted. Increase case complexity)     Hyperlipidemia: atorvastatin     Gastroesophageal reflux disease: Famotidine.      Vitamin D deficiency     GAVE (gastric 
yes